# Patient Record
Sex: MALE | Race: WHITE | NOT HISPANIC OR LATINO
[De-identification: names, ages, dates, MRNs, and addresses within clinical notes are randomized per-mention and may not be internally consistent; named-entity substitution may affect disease eponyms.]

---

## 2017-02-05 ENCOUNTER — TRANSCRIPTION ENCOUNTER (OUTPATIENT)
Age: 47
End: 2017-02-05

## 2017-05-16 ENCOUNTER — TRANSCRIPTION ENCOUNTER (OUTPATIENT)
Age: 47
End: 2017-05-16

## 2017-09-11 ENCOUNTER — TRANSCRIPTION ENCOUNTER (OUTPATIENT)
Age: 47
End: 2017-09-11

## 2017-11-25 ENCOUNTER — INPATIENT (INPATIENT)
Facility: HOSPITAL | Age: 47
LOS: 1 days | Discharge: HOME | DRG: 554 | End: 2017-11-27
Attending: INTERNAL MEDICINE | Admitting: INTERNAL MEDICINE
Payer: COMMERCIAL

## 2017-11-25 VITALS
RESPIRATION RATE: 20 BRPM | TEMPERATURE: 99 F | DIASTOLIC BLOOD PRESSURE: 91 MMHG | OXYGEN SATURATION: 96 % | HEART RATE: 76 BPM | SYSTOLIC BLOOD PRESSURE: 187 MMHG

## 2017-11-25 DIAGNOSIS — M25.50 PAIN IN UNSPECIFIED JOINT: ICD-10-CM

## 2017-11-25 LAB
ALBUMIN SERPL ELPH-MCNC: 3.9 G/DL — SIGNIFICANT CHANGE UP (ref 3.3–5)
ALP SERPL-CCNC: 51 U/L — SIGNIFICANT CHANGE UP (ref 40–120)
ALT FLD-CCNC: 40 U/L RC — SIGNIFICANT CHANGE UP (ref 10–45)
ANION GAP SERPL CALC-SCNC: 13 MMOL/L — SIGNIFICANT CHANGE UP (ref 5–17)
APTT BLD: 31.5 SEC — SIGNIFICANT CHANGE UP (ref 27.5–37.4)
AST SERPL-CCNC: 21 U/L — SIGNIFICANT CHANGE UP (ref 10–40)
B PERT IGG+IGM PNL SER: ABNORMAL
BASOPHILS # BLD AUTO: 0 K/UL — SIGNIFICANT CHANGE UP (ref 0–0.2)
BASOPHILS NFR BLD AUTO: 0.3 % — SIGNIFICANT CHANGE UP (ref 0–2)
BILIRUB SERPL-MCNC: 0.5 MG/DL — SIGNIFICANT CHANGE UP (ref 0.2–1.2)
BUN SERPL-MCNC: 10 MG/DL — SIGNIFICANT CHANGE UP (ref 7–23)
CALCIUM SERPL-MCNC: 9 MG/DL — SIGNIFICANT CHANGE UP (ref 8.4–10.5)
CHLORIDE SERPL-SCNC: 102 MMOL/L — SIGNIFICANT CHANGE UP (ref 96–108)
CO2 SERPL-SCNC: 26 MMOL/L — SIGNIFICANT CHANGE UP (ref 22–31)
COLOR FLD: SIGNIFICANT CHANGE UP
CREAT SERPL-MCNC: 0.97 MG/DL — SIGNIFICANT CHANGE UP (ref 0.5–1.3)
CRP SERPL-MCNC: 7 MG/DL — HIGH (ref 0–0.4)
CRYSTALS SNV QL MICRO: SIGNIFICANT CHANGE UP
CRYSTALS SNV QL MICRO: SIGNIFICANT CHANGE UP
EOSINOPHIL # BLD AUTO: 0.1 K/UL — SIGNIFICANT CHANGE UP (ref 0–0.5)
EOSINOPHIL NFR BLD AUTO: 0.7 % — SIGNIFICANT CHANGE UP (ref 0–6)
ERYTHROCYTE [SEDIMENTATION RATE] IN BLOOD: 49 MM/HR — HIGH (ref 0–15)
FLUID INTAKE SUBSTANCE CLASS: SIGNIFICANT CHANGE UP
FLUID SEGMENTED GRANULOCYTES: 85 % — SIGNIFICANT CHANGE UP
GLUCOSE SERPL-MCNC: 79 MG/DL — SIGNIFICANT CHANGE UP (ref 70–99)
HBA1C BLD-MCNC: 5.1 % — SIGNIFICANT CHANGE UP (ref 4–5.6)
HCT VFR BLD CALC: 42.6 % — SIGNIFICANT CHANGE UP (ref 39–50)
HGB BLD-MCNC: 14.7 G/DL — SIGNIFICANT CHANGE UP (ref 13–17)
INR BLD: 1.14 RATIO — SIGNIFICANT CHANGE UP (ref 0.88–1.16)
LYMPHOCYTES # BLD AUTO: 2.2 K/UL — SIGNIFICANT CHANGE UP (ref 1–3.3)
LYMPHOCYTES # BLD AUTO: 22 % — SIGNIFICANT CHANGE UP (ref 13–44)
LYMPHOCYTES # FLD: 10 % — SIGNIFICANT CHANGE UP
MCHC RBC-ENTMCNC: 32.1 PG — SIGNIFICANT CHANGE UP (ref 27–34)
MCHC RBC-ENTMCNC: 34.6 GM/DL — SIGNIFICANT CHANGE UP (ref 32–36)
MCV RBC AUTO: 92.8 FL — SIGNIFICANT CHANGE UP (ref 80–100)
MONOCYTES # BLD AUTO: 1.1 K/UL — HIGH (ref 0–0.9)
MONOCYTES NFR BLD AUTO: 11.4 % — SIGNIFICANT CHANGE UP (ref 2–14)
MONOS+MACROS # FLD: 5 % — SIGNIFICANT CHANGE UP
NEUTROPHILS # BLD AUTO: 6.5 K/UL — SIGNIFICANT CHANGE UP (ref 1.8–7.4)
NEUTROPHILS NFR BLD AUTO: 65.5 % — SIGNIFICANT CHANGE UP (ref 43–77)
PLATELET # BLD AUTO: 217 K/UL — SIGNIFICANT CHANGE UP (ref 150–400)
POTASSIUM SERPL-MCNC: 4 MMOL/L — SIGNIFICANT CHANGE UP (ref 3.5–5.3)
POTASSIUM SERPL-SCNC: 4 MMOL/L — SIGNIFICANT CHANGE UP (ref 3.5–5.3)
PROT SERPL-MCNC: 7.4 G/DL — SIGNIFICANT CHANGE UP (ref 6–8.3)
PROTHROM AB SERPL-ACNC: 12.3 SEC — SIGNIFICANT CHANGE UP (ref 9.8–12.7)
RBC # BLD: 4.59 M/UL — SIGNIFICANT CHANGE UP (ref 4.2–5.8)
RBC # FLD: 10.9 % — SIGNIFICANT CHANGE UP (ref 10.3–14.5)
RCV VOL RI: HIGH /UL (ref 0–5)
RHEUMATOID FACT SERPL-ACNC: 9 IU/ML — SIGNIFICANT CHANGE UP (ref 0–13.9)
SODIUM SERPL-SCNC: 141 MMOL/L — SIGNIFICANT CHANGE UP (ref 135–145)
TOTAL NUCLEATED CELL COUNT, BODY FLUID: HIGH /UL (ref 0–5)
TUBE TYPE: SIGNIFICANT CHANGE UP
URATE SERPL-MCNC: 5.9 MG/DL — SIGNIFICANT CHANGE UP (ref 3.4–8.8)
WBC # BLD: 9.9 K/UL — SIGNIFICANT CHANGE UP (ref 3.8–10.5)
WBC # FLD AUTO: 9.9 K/UL — SIGNIFICANT CHANGE UP (ref 3.8–10.5)

## 2017-11-25 PROCEDURE — 20605 DRAIN/INJ JOINT/BURSA W/O US: CPT | Mod: LT

## 2017-11-25 PROCEDURE — 99285 EMERGENCY DEPT VISIT HI MDM: CPT | Mod: 25

## 2017-11-25 PROCEDURE — 73610 X-RAY EXAM OF ANKLE: CPT | Mod: 26,50

## 2017-11-25 RX ORDER — HEPARIN SODIUM 5000 [USP'U]/ML
5000 INJECTION INTRAVENOUS; SUBCUTANEOUS EVERY 12 HOURS
Qty: 0 | Refills: 0 | Status: DISCONTINUED | OUTPATIENT
Start: 2017-11-25 | End: 2017-11-27

## 2017-11-25 RX ORDER — KETOROLAC TROMETHAMINE 30 MG/ML
15 SYRINGE (ML) INJECTION EVERY 8 HOURS
Qty: 0 | Refills: 0 | Status: DISCONTINUED | OUTPATIENT
Start: 2017-11-25 | End: 2017-11-26

## 2017-11-25 RX ORDER — ACETAMINOPHEN 500 MG
1000 TABLET ORAL ONCE
Qty: 0 | Refills: 0 | Status: COMPLETED | OUTPATIENT
Start: 2017-11-25 | End: 2017-11-25

## 2017-11-25 RX ORDER — SODIUM CHLORIDE 9 MG/ML
1000 INJECTION INTRAMUSCULAR; INTRAVENOUS; SUBCUTANEOUS
Qty: 0 | Refills: 0 | Status: DISCONTINUED | OUTPATIENT
Start: 2017-11-25 | End: 2017-11-27

## 2017-11-25 RX ORDER — PANTOPRAZOLE SODIUM 20 MG/1
40 TABLET, DELAYED RELEASE ORAL
Qty: 0 | Refills: 0 | Status: DISCONTINUED | OUTPATIENT
Start: 2017-11-25 | End: 2017-11-27

## 2017-11-25 RX ORDER — TRIAMCINOLONE 4 MG
30 TABLET ORAL ONCE
Qty: 0 | Refills: 0 | Status: COMPLETED | OUTPATIENT
Start: 2017-11-25 | End: 2017-11-25

## 2017-11-25 RX ORDER — KETOROLAC TROMETHAMINE 30 MG/ML
30 SYRINGE (ML) INJECTION ONCE
Qty: 0 | Refills: 0 | Status: DISCONTINUED | OUTPATIENT
Start: 2017-11-25 | End: 2017-11-25

## 2017-11-25 RX ORDER — MORPHINE SULFATE 50 MG/1
2 CAPSULE, EXTENDED RELEASE ORAL EVERY 4 HOURS
Qty: 0 | Refills: 0 | Status: DISCONTINUED | OUTPATIENT
Start: 2017-11-25 | End: 2017-11-27

## 2017-11-25 RX ORDER — OXYCODONE HYDROCHLORIDE 5 MG/1
5 TABLET ORAL ONCE
Qty: 0 | Refills: 0 | Status: DISCONTINUED | OUTPATIENT
Start: 2017-11-25 | End: 2017-11-25

## 2017-11-25 RX ADMIN — OXYCODONE HYDROCHLORIDE 5 MILLIGRAM(S): 5 TABLET ORAL at 18:24

## 2017-11-25 RX ADMIN — Medication 30 MILLIGRAM(S): at 20:50

## 2017-11-25 RX ADMIN — Medication 30 MILLIGRAM(S): at 13:09

## 2017-11-25 RX ADMIN — Medication 30 MILLIGRAM(S): at 19:07

## 2017-11-25 RX ADMIN — Medication 400 MILLIGRAM(S): at 12:23

## 2017-11-25 RX ADMIN — SODIUM CHLORIDE 75 MILLILITER(S): 9 INJECTION INTRAMUSCULAR; INTRAVENOUS; SUBCUTANEOUS at 21:23

## 2017-11-25 NOTE — ED PROVIDER NOTE - LOWER EXTREMITY EXAM, LEFT
JOINT SWELLING/SWELLING/LIMITED ROM/increased warmth and erythema over medial and lateral malleoli, no specific achillis tenderness

## 2017-11-25 NOTE — ED PROVIDER NOTE - OBJECTIVE STATEMENT
46 yo M w/ PMH gout presenting for b/l LE edema. Right ankle pain 6 days ago. Hx of right ankle synovial cyst. Resolves with rest in time. Now left ankle swollen as well and pt unable to ambulate. Right knee with increased pain and left foot edema > right foot. Took ibuprofen this AM with some relief of symptoms about 3 and a half hours ago. No known trauma or injury. No SOB, recent travel or hospitalization. Uses chewing tobacco and ETOH weekly.

## 2017-11-25 NOTE — ED PROVIDER NOTE - NS_ ATTENDINGSCRIBEDETAILS _ED_A_ED_FT
47 yom no specific pmhx presents with bilat ankle swelling. states in past, has had great toe swelling for which he saw his PMD and had uric acid levels drawn - states they were normal and was told he did not have gout. states right ankle diffuse swelling started 6 days ago, nontraumatic, was having difficulty bearing weight. no f/c. states right ankle has improved somewhat, but now has left ankle swelling/ pain. is unable to walk due to pain. no calf pain, no recent prolonged immob.    ros and exam as above.     ? gout vs septic arthritis vs pseudogout. joint tapped under US guidance but only able to obtain 1-2 cc of fluid. ortho called for consult who tapped in different location - sent for testing. + crystals - will admit for ongoing management of likely gouty flare, and less likely septic arthritis. rheum called. ABHISHEK Canas MD

## 2017-11-25 NOTE — ED PROVIDER NOTE - NS ED ROS FT
ABHISHEK Canas MD   ROS:   constitutional - no fever, no chills  eyes - no visual changes, no redness  eent - no sore throat, no nasal congestion  cvs - no chest pain, no leg swelling  resp - no shortness of breath, no cough  gi - no abdominal pain, no vomiting, no diarrhea  gu - no dysuria, no hematuria  msk - no acute back pain, + joint swelling  skin - no rashes, no jaundice  neuro - no headache, no focal weakness  psych - no acute mental health issue

## 2017-11-25 NOTE — ED PROVIDER NOTE - MEDICAL DECISION MAKING DETAILS
48 yo M for b/l LE edema L>R with increased pain in b/l feet. No hx of trauma. PE: increased warmth and edema in left ankle with tenderness on palpation. No vascular compromise, no calf tenderness. Neurologically intact. Low concern for DVT due to no risk factors. Low concern for septic arthritis due to patient appearance and VS. Likely gout vs pseudogout due to increased warmth and tenderness over affected area, Will obtain labs, uric acid, inflammatory markers, US for fluid collection and labs, pain control, X-rays. Will consider orthopedic consult based on results.

## 2017-11-25 NOTE — ED ADULT NURSE NOTE - OBJECTIVE STATEMENT
47 y.o male arrived to ED c.o bilateral ankle swelling x1 week. Pt states he has an old injury to right ankle ten years ago which causes him to have occasional swelling/ discomfort. Last week he began having pain bilat with swelling which causes him to be unable to ambulate.  Pt was seen in orthopedic clinic last night with xrays performed which did not show anything.  Pt was given a medication from them yesterday which has not helped with pain. Pt arrives a&ox4, neg sob, neg chest pain, abd soft nontender, pulse motor sensoryx4, +swelling to bilateral +1 to ankles and feet , skin warm dry otherwise intact. Family at bedside. will continue to monitor.

## 2017-11-25 NOTE — ED PROCEDURE NOTE - CPROC ED ARTHRO DETAIL1
A needle was inserted into (see location above):/for aspiration and injection of medication (see medication)

## 2017-11-25 NOTE — H&P ADULT - NSHPLABSRESULTS_GEN_ALL_CORE
14.7   9.9   )-----------( 217      ( 25 Nov 2017 12:30 )             42.6       11-25    141  |  102  |  10  ----------------------------<  79  4.0   |  26  |  0.97    Ca    9.0      25 Nov 2017 12:30    TPro  7.4  /  Alb  3.9  /  TBili  0.5  /  DBili  x   /  AST  21  /  ALT  40  /  AlkPhos  51  11-25                  PT/INR - ( 25 Nov 2017 12:30 )   PT: 12.3 sec;   INR: 1.14 ratio         PTT - ( 25 Nov 2017 12:30 )  PTT:31.5 sec    Lactate Trend            CAPILLARY BLOOD GLUCOSE

## 2017-11-25 NOTE — CONSULT NOTE ADULT - SUBJECTIVE AND OBJECTIVE BOX
47y Male with bilateral ankle pain L > R. right ankle pain has resolved, history of gout.  Pt states that they cannot walk because of pain on left side. Pt denies radiation of pain. Pt denies numbness, tingling, or burning in the BL LE. Pt denies hx of trauma. Pt denies fever/chills. Pt is community ambulator with/without an assistive device.                          14.7   9.9   )-----------( 217      ( 25 Nov 2017 12:30 )             42.6   11-25    141  |  102  |  10  ----------------------------<  79  4.0   |  26  |  0.97    Ca    9.0      25 Nov 2017 12:30    TPro  7.4  /  Alb  3.9  /  TBili  0.5  /  DBili  x   /  AST  21  /  ALT  40  /  AlkPhos  51  11-25    ICU Vital Signs Last 24 Hrs  T(C): 37.1 (25 Nov 2017 11:21), Max: 37.1 (25 Nov 2017 11:21)  T(F): 98.7 (25 Nov 2017 11:21), Max: 98.7 (25 Nov 2017 11:21)  HR: 64 (25 Nov 2017 12:05) (64 - 76)  BP: 139/79 (25 Nov 2017 12:05) (139/79 - 187/91)  BP(mean): --  ABP: --  ABP(mean): --  RR: 18 (25 Nov 2017 12:05) (18 - 20)  SpO2: 98% (25 Nov 2017 12:05) (96% - 98%)        LLE:  Skin intact, + soft tissue swelling, effusion, of ankle, + TTP over Ankle w/ decreased ROM 2/2 pain. Good ROM of Hip/Knee/Ankle without pain. Pt able to SLR. +DP/PT Pulse 2+/4.  SILT L2-S1, +EHL/FHL/TA/Gastroc, soft compartments, no calf ttp.    R/L LE / B/L UE:   No bony TTP, Good ROM w/o pain. Able to SLR on R/L. Exam Unremarkable      Imaging:  XR of L Ankle:   demonstrate no acute fracture or dislocation. + Effusion.     Procedure Note:  The emergency department performed a L ankle arthrocentesis ultrasound guided    A/P:     47yMale w/ rule out L septic ankle  - Pain control  - DVT ppx per primary team  - WBAT LLE  - FU Lab Work, cell count, crystals, gram stain, culture  - PT   - Med mgmt per primary team  - IV abx per primary team/ID  - pt candidate for possible arthroscopic vs. open I&D of affected joint if labs show signs of infection, pt aware, will FU  - all pt's/family members questions answered completely, pt/family understand plan  - NPO until gram stain has resulted 47y Male with bilateral ankle pain L > R. right ankle pain has resolved, history of gout.  Pt states that they cannot walk because of pain on left side. Pt denies radiation of pain. Pt denies numbness, tingling, or burning in the BL LE. Pt denies hx of trauma. Pt denies fever/chills. Pt is community ambulator with/without an assistive device.                          14.7   9.9   )-----------( 217      ( 25 Nov 2017 12:30 )             42.6   11-25    141  |  102  |  10  ----------------------------<  79  4.0   |  26  |  0.97    Ca    9.0      25 Nov 2017 12:30    TPro  7.4  /  Alb  3.9  /  TBili  0.5  /  DBili  x   /  AST  21  /  ALT  40  /  AlkPhos  51  11-25    ICU Vital Signs Last 24 Hrs  T(C): 37.1 (25 Nov 2017 11:21), Max: 37.1 (25 Nov 2017 11:21)  T(F): 98.7 (25 Nov 2017 11:21), Max: 98.7 (25 Nov 2017 11:21)  HR: 64 (25 Nov 2017 12:05) (64 - 76)  BP: 139/79 (25 Nov 2017 12:05) (139/79 - 187/91)  BP(mean): --  ABP: --  ABP(mean): --  RR: 18 (25 Nov 2017 12:05) (18 - 20)  SpO2: 98% (25 Nov 2017 12:05) (96% - 98%)        LLE:  Skin intact, + soft tissue swelling, effusion, of ankle, + TTP over Ankle w/ decreased ROM 2/2 pain. Good ROM of Hip/Knee/Ankle without pain. Pt able to SLR. +DP/PT Pulse 2+/4.  SILT L2-S1, +EHL/FHL/TA/Gastroc, soft compartments, no calf ttp.    R/L LE / B/L UE:   No bony TTP, Good ROM w/o pain. Able to SLR on R/L. Exam Unremarkable      Imaging:  XR of L Ankle:   demonstrate no acute fracture or dislocation. + Effusion.     Procedure Note:  The emergency department performed a L ankle arthrocentesis ultrasound guided    A/P:     47yMale w/ rule out L septic ankle, gram stain negative, most likely not septic  - Pain control  - DVT ppx per primary team  - WBAT LLE  - FU culture  - PT   - Med mgmt per primary team  - IV abx as needed per primary team  - all pt's/family members questions answered completely, pt/family understand plan  - no acute ortho intervention needed at this time, will follow culture to confirm ankle is not septic  - FU with Dr. Dodson as outpatient  - please call back with questions 47y Male with bilateral ankle pain L > R. right ankle pain has resolved over the last couple days, history of gout.  Pt went to an urgent care and was told just needs physical therapy. states that he cannot walk because of pain on left side, can put weight on right but with pain. Pt denies radiation of pain. Pt denies numbness, tingling, or burning in the BL LE. Pt denies hx of trauma. Pt denies fever/chills. Pt is community ambulator with/without an assistive device.                          14.7   9.9   )-----------( 217      ( 25 Nov 2017 12:30 )             42.6   11-25    141  |  102  |  10  ----------------------------<  79  4.0   |  26  |  0.97    Ca    9.0      25 Nov 2017 12:30    TPro  7.4  /  Alb  3.9  /  TBili  0.5  /  DBili  x   /  AST  21  /  ALT  40  /  AlkPhos  51  11-25    ICU Vital Signs Last 24 Hrs  T(C): 37.1 (25 Nov 2017 11:21), Max: 37.1 (25 Nov 2017 11:21)  T(F): 98.7 (25 Nov 2017 11:21), Max: 98.7 (25 Nov 2017 11:21)  HR: 64 (25 Nov 2017 12:05) (64 - 76)  BP: 139/79 (25 Nov 2017 12:05) (139/79 - 187/91)  BP(mean): --  ABP: --  ABP(mean): --  RR: 18 (25 Nov 2017 12:05) (18 - 20)  SpO2: 98% (25 Nov 2017 12:05) (96% - 98%)        LLE:  Skin intact, + soft tissue swelling, effusion, erythema over medial mal of ankle, + TTP over Ankle w/ decreased ROM 2/2 pain unable to DR or PF. Good ROM of Hip/Knee/Ankle without pain. Pt able to SLR. +DP/PT Pulse 2+/4.  SILT L2-S1, +EHL/FHL/TA/Gastroc, soft compartments, no calf ttp.    RLE  Skin intact, mild soft tissue swelling, no gross effusion of ankle, + TTP over Ankle w/ good ROM full DF and PF. Good ROM of Hip/Knee/Ankle without pain. Pt able to SLR. +DP/PT Pulse 2+/4.  SILT L2-S1, +EHL/FHL/TA/Gastroc, soft compartments, no calf ttp.    B/L UE:   No bony TTP, Good ROM w/o pain. Able to SLR on R/L. Exam Unremarkable      Imaging:  XR of L Ankle:   demonstrate no acute fracture or dislocation. + Effusion.     Procedure Note:  The emergency department performed a L ankle arthrocentesis ultrasound guided    I personally re-aspirated the ankle joint under sterile technique. aspirated 7cc of sero sanguis fluid    A/P:     47yMale w/ rule out L septic ankle  - Pain control  - DVT ppx per primary team  - WBAT LLE  - FU cc. nancy. gram stain and culture  - PT   - Med mgmt per primary team  - IV abx as per primary team  - all pt's/family members questions answered completely, pt/family understand plan  - keep NPO until gram stain and cell count has been received

## 2017-11-25 NOTE — CHART NOTE - NSCHARTNOTEFT_GEN_A_CORE
L ankle aspiration results received. CC 14K, Crystals positive for monosodium urate, GS neg    L ankle gout, highly unlikely septic joint, will follow cultures    No further ortho intervention needed at this time  Please call back with questions  May follow up with Dr. Dodson as outpatient as needed    Rich Wilder  PGY [phone]  [phone]

## 2017-11-25 NOTE — H&P ADULT - HISTORY OF PRESENT ILLNESS
46 yo Male  w/ ? of gout presenting for b/l LE edema. Right ankle painabout 1 week ago . Hx of right ankle synovial cyst. Resolves with rest   . Now left ankle swollen as well and pt unable to ambulate.  left foot edema > right foot. Took ibuprofen this AM with some relief of symptoms   No known trauma or injury. No SOB, recent travel or hospitalization.

## 2017-11-25 NOTE — H&P ADULT - ASSESSMENT
pt w/ b/l ankle pain / swelling / inabiltity to walk  ?gout / acute arthritic flare  septic joint is low possibility  f/u aspiration  id eval  rheum eval  analgesics  dvt proph  f/u rf/ shelia/ tsh

## 2017-11-25 NOTE — ED ADULT NURSE NOTE - CHIEF COMPLAINT QUOTE
bilat lower extrem pain x 1week ago, difficulty ambulating, bilateral lower extrem edema pos erythema

## 2017-11-25 NOTE — ED PROVIDER NOTE - PHYSICAL EXAMINATION
ABHISHEK Canas MD   Physical Exam:   constitutional - well appearing, awake and alert, oriented x3  head - no external evidence of trauma  cvs - rrr, no murmurs, no peripheral edema  resp - breath sounds clear and equal bilat  gi - abdomen soft and nontender, no rigidity, guarding or rebound, bowel sounds present  msk - moving all extremities spontaneously. right ankle w mild diffuse swelling and decr rom secondary to pain. left ankle significantly swollen, erythematous, warm to touch, stiff upon rom. distal dp pulses intact bilat.   neuro - alert and oriented x3, no focal deficits, CNs 2-12 grossly intact  skin- no jaundice, warm and dry  psych - mood and affect wnl, no apparent risk to self or others

## 2017-11-26 LAB
GRAM STN FLD: SIGNIFICANT CHANGE UP
SPECIMEN SOURCE: SIGNIFICANT CHANGE UP
TSH SERPL-MCNC: 0.68 UIU/ML — SIGNIFICANT CHANGE UP (ref 0.27–4.2)

## 2017-11-26 PROCEDURE — 99254 IP/OBS CNSLTJ NEW/EST MOD 60: CPT

## 2017-11-26 PROCEDURE — 93970 EXTREMITY STUDY: CPT | Mod: 26

## 2017-11-26 RX ORDER — KETOROLAC TROMETHAMINE 30 MG/ML
30 SYRINGE (ML) INJECTION EVERY 8 HOURS
Qty: 0 | Refills: 0 | Status: DISCONTINUED | OUTPATIENT
Start: 2017-11-26 | End: 2017-11-27

## 2017-11-26 RX ADMIN — PANTOPRAZOLE SODIUM 40 MILLIGRAM(S): 20 TABLET, DELAYED RELEASE ORAL at 04:57

## 2017-11-26 RX ADMIN — Medication 15 MILLIGRAM(S): at 11:00

## 2017-11-26 RX ADMIN — Medication 30 MILLIGRAM(S): at 17:51

## 2017-11-26 RX ADMIN — Medication 15 MILLIGRAM(S): at 02:05

## 2017-11-26 RX ADMIN — HEPARIN SODIUM 5000 UNIT(S): 5000 INJECTION INTRAVENOUS; SUBCUTANEOUS at 17:53

## 2017-11-26 RX ADMIN — Medication 30 MILLIGRAM(S): at 18:06

## 2017-11-26 RX ADMIN — HEPARIN SODIUM 5000 UNIT(S): 5000 INJECTION INTRAVENOUS; SUBCUTANEOUS at 04:57

## 2017-11-26 RX ADMIN — Medication 15 MILLIGRAM(S): at 01:35

## 2017-11-26 RX ADMIN — Medication 15 MILLIGRAM(S): at 10:40

## 2017-11-26 NOTE — CONSULT NOTE ADULT - ASSESSMENT
Imp:  46 yo male with acute arthritis of the ankle; no trauma.  Started after drinking EtoH and eating red meat and joint fluid aspirate shows monosodium urate crystals which confirms gouty arthritis.  Infection is unlikely frank with negative culture of joint fluid.    Rec:  Increase Toradol to 30mg tid  Discussed diet in detail  May need uric acid lowering treatment (despite current acceptable uric level) due to number and severity of attacks, but this should be addressed as outpatient after the attack has resolved.

## 2017-11-26 NOTE — CONSULT NOTE ADULT - ASSESSMENT
47M with acute gouty arthropathy of the left ankle.  No evidence of septic joint.  ESR and temperature can be elevated on the basis of gout.  Agree with no antibiotics    - continue to observe off antibiotics  - f/u rheumatology  - please call ID if change in status

## 2017-11-26 NOTE — PROGRESS NOTE ADULT - ASSESSMENT
pt w/ b/l ankle pain / swelling / inabiltity to walk  likely severe gouty attack / acute arthritic flare  septic joint is low possibility  f/u aspiration   id eval  rheum eval  analgesics  dvt proph  f/u rf/ shelia/ tsh

## 2017-11-26 NOTE — CONSULT NOTE ADULT - SUBJECTIVE AND OBJECTIVE BOX
HISTORY OF PRESENT ILLNESS:  46 yo male with h/o presumed gout (no h/o joint fluid aspiration in the past); he has had arthritic attacks in feet and wrist ~3x/year over the last 8 years or so,.  He comes in after one week of acute onset L ankle pain, which has also affected the L knee and R ankle, though not as severely.  He was unable to bear weight.  He drinks Rum weekly and eats red meat most days.  No trauma or injury    PAST MEDICAL & SURGICAL HISTORY:  No pertinent past medical history  No significant past surgical history        MEDICATIONS  (STANDING):  heparin  Injectable 5000 Unit(s) SubCutaneous every 12 hours  ketorolac   Injectable 15 milliGRAM(s) IV Push every 8 hours  pantoprazole    Tablet 40 milliGRAM(s) Oral before breakfast  sodium chloride 0.9%. 1000 milliLiter(s) (75 mL/Hr) IV Continuous <Continuous>    MEDICATIONS  (PRN):  morphine  - Injectable 2 milliGRAM(s) IV Push every 4 hours PRN Severe Pain (7 - 10)      Allergies    No Known Allergies    Intolerances        PERTINENT MEDICATION HISTORY:    SOCIAL HISTORY:    FAMILY HISTORY:  No pertinent family history in first degree relatives (pt states he was adopted)      Vital Signs Last 24 Hrs  T(C): 37.1 (26 Nov 2017 09:23), Max: 38.6 (25 Nov 2017 18:25)  T(F): 98.8 (26 Nov 2017 09:23), Max: 101.5 (25 Nov 2017 18:25)  HR: 77 (26 Nov 2017 09:23) (64 - 81)  BP: 162/87 (26 Nov 2017 09:23) (139/79 - 187/91)  BP(mean): --  RR: 18 (26 Nov 2017 09:23) (18 - 20)  SpO2: 97% (26 Nov 2017 09:23) (96% - 100%)    Daily     Daily     PHYSICAL EXAM:      Constitutional:  well appearing    Eyes:  EOMi    ENMT:    Neck:  supple    Breasts:    Back:    Respiratory:    Cardiovascular:    Gastrointestinal:  soft nt, nd    Genitourinary:    Rectal:    Extremities:  No sign edema    Vascular:  good distal perfusion    Neurological:    Skin:  No rashes seen    Lymph Nodes:    Musculoskeletal:  L knee; pain with ROM, no effusion  L ankle; very painful ROM-both subtalar and ankle mortise, tender to palpation, +warmth, mild rednes  R ankle; good ROM, no sign pain  Feet; non tender-1st MTPs with good ROM    Psychiatric:  appropriate, alert, oriented, has full conversation    LABS:                        14.7   9.9   )-----------( 217      ( 25 Nov 2017 12:30 )             42.6     11-25    141  |  102  |  10  ----------------------------<  79  4.0   |  26  |  0.97    Ca    9.0      25 Nov 2017 12:30    TPro  7.4  /  Alb  3.9  /  TBili  0.5  /  DBili  x   /  AST  21  /  ALT  40  /  AlkPhos  51  11-25    PT/INR - ( 25 Nov 2017 12:30 )   PT: 12.3 sec;   INR: 1.14 ratio         PTT - ( 25 Nov 2017 12:30 )  PTT:31.5 sec  Uric acid -5.9  Joint fluid aspirate:   +MSU crystals  14K WBCs  Culture negative        RADIOLOGY & ADDITIONAL STUDIES:

## 2017-11-26 NOTE — CONSULT NOTE ADULT - SUBJECTIVE AND OBJECTIVE BOX
HPI:  47M presumptive hx R ankle gout.  Now admitted yesterday with left ankle pain and swelling.  Low grade fever.  Aspirated and found to have crystals.  ID is asked to help management.  Still wtih pain and unable to ambulate.  Seen by rheumatology who has confirmed diagnosis.  Gram stain negative for bacteria.  No cell count sent.  (+) crystals.  No antibiotics given.    PAST MEDICAL & SURGICAL HISTORY:  gout    Allergies  No Known Allergies    ANTIMICROBIALS:    none    OTHER MEDS: MEDICATIONS  (STANDING):  heparin  Injectable 5000 every 12 hours  ketorolac   Injectable 30 every 8 hours  morphine  - Injectable 2 every 4 hours PRN  pantoprazole    Tablet 40 before breakfast    SOCIAL HISTORY:  chewing tobacco; ; works for health system in IT    FAMILY HISTORY:  No pertinent family history in first degree relatives    REVIEW OF SYSTEMS  [  ] ROS unobtainable because:    [x  ] All other systems negative except as noted below:	    Constitutional:  [x ] fever [ ] weight loss  Skin:  [ ] rash [ ] phlebitis	  Eyes: [ ] icterus [ ] inflammation	  ENMT: [ ] discharge [ ] thrush [ ] ulcers [ ] exudates  Respiratory: [ ] dyspnea [ ] hemoptysis [ ] cough [ ] sputum	  Cardiovascular:  [ ] chest pain [ ] palpitations [ ] edema	  Gastrointestinal:  [ ] nausea [ ] vomiting [ ] diarrhea [ ] constipation [ ] pain	  Genitourinary:  [ ] dysuria [ ] frequency [ ] hematuria [ ] discharge [ ] flank pain  Musculoskeletal:  [ ] myalgias [ ] arthralgias [x ] arthritis	  Neurological:  [ ] headache [ ] seizures	  Psychiatric:  [ ] anxiety [ ] depression	  Hematology/Lymphatics:  [ ] lymphadenopathy  Endocrine:  [ ] adrenal [ ] thyroid  Allergic/Immunologic:	 [ ] transplant [ ] seasonal    Vital Signs Last 24 Hrs  T(F): 98.8 (11-26-17 @ 09:23), Max: 101.5 (11-25-17 @ 18:25)  HR: 77 (11-26-17 @ 09:23) (64 - 81)  BP: 162/87 (11-26-17 @ 09:23) (139/79 - 187/91)  RR: 18 (11-26-17 @ 09:23)  SpO2: 97% (11-26-17 @ 09:23) (96% - 100%)  Wt(kg): --    PHYSICAL EXAM:  General: non-toxic  HEAD/EYES: anicteric  ENT:  supple  Cardiovascular:   S1, S2  Respiratory:  clear bilaterally  GI:  soft, non-tender, normal bowel sounds  :  no olson  Musculoskeletal:  decreased ROM L ankle with associated swelling; no cellulitis  Neurologic:  grossly non-focal  Skin:  no rash  Lymph: no lymphadenopathy  Psychiatric:  appropriate affect  Vascular:  no phlebitis                        14.7   9.9   )-----------( 217      ( 25 Nov 2017 12:30 )             42.6     141  |  102  |  10  ----------------------------<  79  4.0   |  26  |  0.97    Ca    9.0      25 Nov 2017 12:30    TPro  7.4  /  Alb  3.9  /  TBili  0.5  /  DBili  x   /  AST  21  /  ALT  40  /  AlkPhos  51  11-25    Sedimentation Rate, Erythrocyte: 49 mm/hr (11.25.17 @ 12:30)    Crystals, Synovial Fluid:   CONTAINER: _STERILE  CUP  COLOR: RED   Ref Range: Colorless  CLARITY: BLOODY   Ref Range: Clear  RESULT: FEW  MONOSODIUM  URATE CRYSTALS.  Ref Range: None Seen (11.25.17 @ 19:22)    MICROBIOLOGY:  .Body Fluid Synovial Fluid  11-26-17 --  --    Numerous polymorphonuclear leukocytes per low power field  No organisms seen  by cytocentrifuge    .Body Fluid synovial fluid  11-25-17 --  --    No polymorphonuclear cells seen per low power field  No organisms seen per oil power field    RADIOLOGY:  Xray Ankle Complete 3 Views, Bilateral (11.25.17 @ 13:13) >  IMPRESSION:  No acute bone or joint disease.                v            RADIOLOGY:

## 2017-11-26 NOTE — PROGRESS NOTE ADULT - SUBJECTIVE AND OBJECTIVE BOX
CHIEF COMPLAINT:Patient is sightly better  	        PAST MEDICAL & SURGICAL HISTORY:  No pertinent past medical history  No significant past surgical history          REVIEW OF SYSTEMS:  CONSTITUTIONAL: No fever, weight loss, or fatigue  EYES: No eye pain, visual disturbances, or discharge  NECK: No pain or stiffness  RESPIRATORY: No cough, wheezing, chills or hemoptysis; No Shortness of Breath  CARDIOVASCULAR: No chest pain, palpitations, passing out, dizziness, or leg swelling  GASTROINTESTINAL: No abdominal or epigastric pain. No nausea, vomiting, or hematemesis; No diarrhea or constipation. No melena or hematochezia.  GENITOURINARY: No dysuria, frequency, hematuria, or incontinence  NEUROLOGICAL: No headaches, memory loss, loss of strength, numbness, or tremors  SKIN: No itching, burning, rashes, or lesion    Medications:  MEDICATIONS  (STANDING):  heparin  Injectable 5000 Unit(s) SubCutaneous every 12 hours  ketorolac   Injectable 15 milliGRAM(s) IV Push every 8 hours  pantoprazole    Tablet 40 milliGRAM(s) Oral before breakfast  sodium chloride 0.9%. 1000 milliLiter(s) (75 mL/Hr) IV Continuous <Continuous>    MEDICATIONS  (PRN):  morphine  - Injectable 2 milliGRAM(s) IV Push every 4 hours PRN Severe Pain (7 - 10)    	    PHYSICAL EXAM:  T(C): 37.1 (11-26-17 @ 09:23), Max: 38.6 (11-25-17 @ 18:25)  HR: 77 (11-26-17 @ 09:23) (64 - 81)  BP: 162/87 (11-26-17 @ 09:23) (139/79 - 187/91)  RR: 18 (11-26-17 @ 09:23) (18 - 20)  SpO2: 97% (11-26-17 @ 09:23) (96% - 100%)  Wt(kg): --  I&O's Summary    25 Nov 2017 07:01  -  26 Nov 2017 07:00  --------------------------------------------------------  IN: 0 mL / OUT: 700 mL / NET: -700 mL    26 Nov 2017 07:01 - 26 Nov 2017 09:53  --------------------------------------------------------  IN: 300 mL / OUT: 500 mL / NET: -200 mL        Appearance: Normal	  HEENT:   Normal oral mucosa, PERRL, EOMI	  Lymphatic: No lymphadenopathy  Cardiovascular: Normal S1 S2, No JVD, No murmurs, No edema  Respiratory: Lungs clear to auscultation	  Psychiatry: A & O x 3, Mood & affect appropriate  Gastrointestinal:  Soft, Non-tender, + BS	  Skin: No rashes, No ecchymoses, No cyanosis	  Neurologic: Non-focal  Extremities:b/l ankle pain / tenderness / swelling   Vascular: Peripheral pulses palpable 2+ bilaterally    TELEMETRY: 	    ECG:  	  RADIOLOGY:  OTHER: 	  	  LABS:	 	    CARDIAC MARKERS:                                14.7   9.9   )-----------( 217      ( 25 Nov 2017 12:30 )             42.6     11-25    141  |  102  |  10  ----------------------------<  79  4.0   |  26  |  0.97    Ca    9.0      25 Nov 2017 12:30    TPro  7.4  /  Alb  3.9  /  TBili  0.5  /  DBili  x   /  AST  21  /  ALT  40  /  AlkPhos  51  11-25    proBNP:   Lipid Profile:   HgA1c: Hemoglobin A1C, Whole Blood: 5.1 % (11-25 @ 23:16)    TSH: Thyroid Stimulating Hormone, Serum: 0.68 uIU/mL (11-25 @ 23:16)

## 2017-11-27 ENCOUNTER — TRANSCRIPTION ENCOUNTER (OUTPATIENT)
Age: 47
End: 2017-11-27

## 2017-11-27 VITALS
HEART RATE: 73 BPM | OXYGEN SATURATION: 97 % | SYSTOLIC BLOOD PRESSURE: 158 MMHG | RESPIRATION RATE: 18 BRPM | TEMPERATURE: 99 F | DIASTOLIC BLOOD PRESSURE: 94 MMHG

## 2017-11-27 LAB
ANA PAT FLD IF-IMP: ABNORMAL
ANA TITR SER: ABNORMAL

## 2017-11-27 PROCEDURE — G0378: CPT

## 2017-11-27 PROCEDURE — 86431 RHEUMATOID FACTOR QUANT: CPT

## 2017-11-27 PROCEDURE — 87070 CULTURE OTHR SPECIMN AEROBIC: CPT

## 2017-11-27 PROCEDURE — 84550 ASSAY OF BLOOD/URIC ACID: CPT

## 2017-11-27 PROCEDURE — 96374 THER/PROPH/DIAG INJ IV PUSH: CPT | Mod: XU

## 2017-11-27 PROCEDURE — 85652 RBC SED RATE AUTOMATED: CPT

## 2017-11-27 PROCEDURE — 85730 THROMBOPLASTIN TIME PARTIAL: CPT

## 2017-11-27 PROCEDURE — 20605 DRAIN/INJ JOINT/BURSA W/O US: CPT | Mod: LT

## 2017-11-27 PROCEDURE — 87205 SMEAR GRAM STAIN: CPT

## 2017-11-27 PROCEDURE — 97161 PT EVAL LOW COMPLEX 20 MIN: CPT

## 2017-11-27 PROCEDURE — 80053 COMPREHEN METABOLIC PANEL: CPT

## 2017-11-27 PROCEDURE — 99285 EMERGENCY DEPT VISIT HI MDM: CPT | Mod: 25

## 2017-11-27 PROCEDURE — 86140 C-REACTIVE PROTEIN: CPT

## 2017-11-27 PROCEDURE — 85610 PROTHROMBIN TIME: CPT

## 2017-11-27 PROCEDURE — 93970 EXTREMITY STUDY: CPT

## 2017-11-27 PROCEDURE — 86038 ANTINUCLEAR ANTIBODIES: CPT

## 2017-11-27 PROCEDURE — 85027 COMPLETE CBC AUTOMATED: CPT

## 2017-11-27 PROCEDURE — 84443 ASSAY THYROID STIM HORMONE: CPT

## 2017-11-27 PROCEDURE — 96375 TX/PRO/DX INJ NEW DRUG ADDON: CPT | Mod: XU

## 2017-11-27 PROCEDURE — 87075 CULTR BACTERIA EXCEPT BLOOD: CPT

## 2017-11-27 PROCEDURE — 89060 EXAM SYNOVIAL FLUID CRYSTALS: CPT

## 2017-11-27 PROCEDURE — 73610 X-RAY EXAM OF ANKLE: CPT

## 2017-11-27 PROCEDURE — 83036 HEMOGLOBIN GLYCOSYLATED A1C: CPT

## 2017-11-27 PROCEDURE — 89051 BODY FLUID CELL COUNT: CPT

## 2017-11-27 RX ORDER — KETOROLAC TROMETHAMINE 30 MG/ML
3 SYRINGE (ML) INJECTION
Qty: 27 | Refills: 0 | OUTPATIENT
Start: 2017-11-27 | End: 2017-11-30

## 2017-11-27 RX ORDER — KETOROLAC TROMETHAMINE 30 MG/ML
1 SYRINGE (ML) INJECTION
Qty: 12 | Refills: 0 | OUTPATIENT
Start: 2017-11-27 | End: 2017-11-30

## 2017-11-27 RX ORDER — KETOROLAC TROMETHAMINE 30 MG/ML
1 SYRINGE (ML) INJECTION
Qty: 12 | Refills: 0
Start: 2017-11-27 | End: 2017-11-30

## 2017-11-27 RX ORDER — KETOROLAC TROMETHAMINE 30 MG/ML
1 SYRINGE (ML) INJECTION
Qty: 0 | Refills: 0 | COMMUNITY

## 2017-11-27 RX ORDER — PANTOPRAZOLE SODIUM 20 MG/1
1 TABLET, DELAYED RELEASE ORAL
Qty: 7 | Refills: 0 | OUTPATIENT
Start: 2017-11-27 | End: 2017-12-04

## 2017-11-27 RX ORDER — IBUPROFEN 200 MG
4 TABLET ORAL
Qty: 0 | Refills: 0 | COMMUNITY

## 2017-11-27 RX ORDER — PANTOPRAZOLE SODIUM 20 MG/1
1 TABLET, DELAYED RELEASE ORAL
Qty: 7 | Refills: 0
Start: 2017-11-27 | End: 2017-12-04

## 2017-11-27 RX ADMIN — Medication 30 MILLIGRAM(S): at 02:00

## 2017-11-27 RX ADMIN — Medication 20 MILLIGRAM(S): at 10:09

## 2017-11-27 RX ADMIN — PANTOPRAZOLE SODIUM 40 MILLIGRAM(S): 20 TABLET, DELAYED RELEASE ORAL at 05:20

## 2017-11-27 RX ADMIN — Medication 30 MILLIGRAM(S): at 10:09

## 2017-11-27 RX ADMIN — Medication 30 MILLIGRAM(S): at 01:21

## 2017-11-27 RX ADMIN — HEPARIN SODIUM 5000 UNIT(S): 5000 INJECTION INTRAVENOUS; SUBCUTANEOUS at 05:20

## 2017-11-27 NOTE — PHYSICAL THERAPY INITIAL EVALUATION ADULT - PERTINENT HX OF CURRENT PROBLEM, REHAB EVAL
46 yo male admit with c/o difficulty ambulating, bilat LE swelling. Pt found to have gout bilat ankle

## 2017-11-27 NOTE — CHART NOTE - NSCHARTNOTEFT_GEN_A_CORE
Request from Dr. Humphries to facilitate patient discharge. Medication reconciliation reviewed, revised, and resolved with Dr. humphries who had medically cleared patient for discharge with follow-up as advised. Please refer to discharge note for detailed hospital course. Patient is currently stable for discharge to home at this time.    Contact # [phone]

## 2017-11-27 NOTE — PROGRESS NOTE ADULT - SUBJECTIVE AND OBJECTIVE BOX
CHIEF COMPLAINT:Patient improved  	        PAST MEDICAL & SURGICAL HISTORY:  No pertinent past medical history  No significant past surgical history          REVIEW OF SYSTEMS:  CONSTITUTIONAL: No fever, weight loss, or fatigue  EYES: No eye pain, visual disturbances, or discharge  NECK: No pain or stiffness  RESPIRATORY: No cough, wheezing, chills or hemoptysis; No Shortness of Breath  CARDIOVASCULAR: No chest pain, palpitations, passing out, dizziness, or leg swelling  GASTROINTESTINAL: No abdominal or epigastric pain. No nausea, vomiting, or hematemesis; No diarrhea or constipation. No melena or hematochezia.  GENITOURINARY: No dysuria, frequency, hematuria, or incontinence  NEUROLOGICAL: No headaches, memory loss, loss of strength, numbness, or tremors  less ankle foot and knee pain     Medications:  MEDICATIONS  (STANDING):  heparin  Injectable 5000 Unit(s) SubCutaneous every 12 hours  ketorolac   Injectable 30 milliGRAM(s) IV Push every 8 hours  methylPREDNISolone sodium succinate Injectable 20 milliGRAM(s) IV Push once  pantoprazole    Tablet 40 milliGRAM(s) Oral before breakfast  sodium chloride 0.9%. 1000 milliLiter(s) (75 mL/Hr) IV Continuous <Continuous>    MEDICATIONS  (PRN):  morphine  - Injectable 2 milliGRAM(s) IV Push every 4 hours PRN Severe Pain (7 - 10)    	    PHYSICAL EXAM:  T(C): 36.6 (11-27-17 @ 06:28), Max: 37.1 (11-26-17 @ 09:23)  HR: 60 (11-27-17 @ 06:28) (60 - 77)  BP: 132/78 (11-27-17 @ 06:28) (132/78 - 169/93)  RR: 18 (11-27-17 @ 06:28) (18 - 18)  SpO2: 97% (11-27-17 @ 06:28) (96% - 97%)  Wt(kg): --  I&O's Summary    26 Nov 2017 07:01  -  27 Nov 2017 07:00  --------------------------------------------------------  IN: 2770 mL / OUT: 1675 mL / NET: 1095 mL    27 Nov 2017 07:01  -  27 Nov 2017 09:22  --------------------------------------------------------  IN: 240 mL / OUT: 725 mL / NET: -485 mL        Appearance: Normal	  HEENT:   Normal oral mucosa, PERRL, EOMI	  Lymphatic: No lymphadenopathy  Cardiovascular: Normal S1 S2, No JVD, No murmurs, No edema  Respiratory: Lungs clear to auscultation	  Psychiatry: A & O x 3, Mood & affect appropriate  Gastrointestinal:  Soft, Non-tender, + BS	  Skin: No rashes, No ecchymoses, No cyanosis	  Neurologic: Non-focal  Extremities:rom better less tender and swollen  Vascular: Peripheral pulses palpable 2+ bilaterally    TELEMETRY: 	    ECG:  	  RADIOLOGY:  OTHER: 	  	  LABS:	 	    CARDIAC MARKERS:                                14.7   9.9   )-----------( 217      ( 25 Nov 2017 12:30 )             42.6     11-25    141  |  102  |  10  ----------------------------<  79  4.0   |  26  |  0.97    Ca    9.0      25 Nov 2017 12:30    TPro  7.4  /  Alb  3.9  /  TBili  0.5  /  DBili  x   /  AST  21  /  ALT  40  /  AlkPhos  51  11-25    proBNP:   Lipid Profile:   HgA1c:   TSH:

## 2017-11-27 NOTE — DISCHARGE NOTE ADULT - CARE PLAN
Principal Discharge DX:	Gouty arthritis of ankle or foot  Goal:	Complete resolution  Instructions for follow-up, activity and diet:	NO ALCOHOL, RED MEAT - diet as discussed  Follow up with Rheumatologist - Dr. Goldberg in 2 days - call for appointment  Seek medical help for increase in pain, fever, chills, worsening swelling and redness

## 2017-11-27 NOTE — DISCHARGE NOTE ADULT - MEDICATION SUMMARY - MEDICATIONS TO STOP TAKING
I will STOP taking the medications listed below when I get home from the hospital:    Advil 200 mg oral tablet  -- 4 tab(s) by mouth 3 times a day, As Needed    black cherry extract  -- 1 tab(s) by mouth once a day

## 2017-11-27 NOTE — PHYSICAL THERAPY INITIAL EVALUATION ADULT - ADDITIONAL COMMENTS
Pt lives in private home with spouse, 3 steps to enter, 10 steps to second floor. Pt with railing for steps. Pt not using AD prior to admission.

## 2017-11-27 NOTE — DISCHARGE NOTE ADULT - MEDICATION SUMMARY - MEDICATIONS TO TAKE
I will START or STAY ON the medications listed below when I get home from the hospital:    ketorolac 10 mg oral tablet  -- 1 tab(s) by mouth 4 times a day   -- It is very important that you take or use this exactly as directed.  Do not skip doses or discontinue unless directed by your doctor.  May cause drowsiness or dizziness.  Obtain medical advice before taking any non-prescription drugs as some may affect the action of this medication.  Take with food or milk.    -- Indication: For Arthritic pain    Protonix 40 mg oral delayed release tablet  -- 1 tab(s) by mouth once a day   -- It is very important that you take or use this exactly as directed.  Do not skip doses or discontinue unless directed by your doctor.  Obtain medical advice before taking any non-prescription drugs as some may affect the action of this medication.  Swallow whole.  Do not crush.    -- Indication: For stomach protection    Multiple Vitamins oral tablet  -- 1 tab(s) by mouth once a day  -- Indication: For supplement I will START or STAY ON the medications listed below when I get home from the hospital:    ketorolac 10 mg oral tablet  -- 1 tab(s) by mouth 4 times a day   -- It is very important that you take or use this exactly as directed.  Do not skip doses or discontinue unless directed by your doctor.  May cause drowsiness or dizziness.  Obtain medical advice before taking any non-prescription drugs as some may affect the action of this medication.  Take with food or milk.    -- Indication: For pain    Protonix 40 mg oral delayed release tablet  -- 1 tab(s) by mouth once a day   -- It is very important that you take or use this exactly as directed.  Do not skip doses or discontinue unless directed by your doctor.  Obtain medical advice before taking any non-prescription drugs as some may affect the action of this medication.  Swallow whole.  Do not crush.    -- Indication: For prophylaxis    Multiple Vitamins oral tablet  -- 1 tab(s) by mouth once a day  -- Indication: For supplement

## 2017-11-27 NOTE — DISCHARGE NOTE ADULT - HOSPITAL COURSE
48 yo male with h/o presumed gout (no h/o joint fluid aspiration in the past); he has had arthritic attacks in feet and wrist ~3x/year over the last 8 years  presents to ER with one week of acute onset L ankle pain, which has also affected the L knee and R ankle, though not as severely and inability to bear weight.  Started after drinking EtoH and eating red meat and joint fluid aspirate shows monosodium urate crystals which confirms gouty arthritis.  Infection is unlikely frank with negative culture of joint fluid.   Left ankle joint fluid  aspirate on 11/25 - +MSU crystals, Culture negative 14K WBCs  Per Rheumatology - continue toradol for pain, may need uric acid lowering treatment (despite current acceptable uric level) due to number and severity of attacks, but this should be addressed as outpatient after the attack has resolved. Diet discussed  Follow up with Rheumatologist - Dr. Goldberg in  in 2 days - call for appointment

## 2017-11-27 NOTE — DISCHARGE NOTE ADULT - PLAN OF CARE
Complete resolution NO ALCOHOL, RED MEAT - diet as discussed  Follow up with Rheumatologist - Dr. Goldberg in 2 days - call for appointment  Seek medical help for increase in pain, fever, chills, worsening swelling and redness

## 2017-11-27 NOTE — PROGRESS NOTE ADULT - ASSESSMENT
pt w/ b/l ankle pain / swelling / inabiltity to walk  likely severe gouty attack / acute arthritic flare  septic joint is ruled out   f/u aspiration   id eval noted  rheum eval noted  analgesics  dvt proph  iv solumedrol x 1 now  cont toradol po as outpt and f/u w/ dr avi goldberg rheumatology

## 2017-11-30 LAB
CULTURE RESULTS: SIGNIFICANT CHANGE UP
SPECIMEN SOURCE: SIGNIFICANT CHANGE UP

## 2019-02-06 NOTE — DISCHARGE NOTE ADULT - CARE PROVIDER_API CALL
4
Goldberg, Avram Z (MD), Internal Medicine; Rheumatology  [address] [facility]  [place] [address]  Phone: [phone]  Fax: [phone]

## 2019-02-09 ENCOUNTER — TRANSCRIPTION ENCOUNTER (OUTPATIENT)
Age: 49
End: 2019-02-09

## 2019-02-09 ENCOUNTER — APPOINTMENT (OUTPATIENT)
Dept: ORTHOPEDIC SURGERY | Facility: CLINIC | Age: 49
End: 2019-02-09
Payer: COMMERCIAL

## 2019-02-09 VITALS
HEIGHT: 73 IN | SYSTOLIC BLOOD PRESSURE: 148 MMHG | HEART RATE: 72 BPM | BODY MASS INDEX: 32.07 KG/M2 | TEMPERATURE: 98.1 F | WEIGHT: 242 LBS | DIASTOLIC BLOOD PRESSURE: 89 MMHG

## 2019-02-09 PROCEDURE — 73564 X-RAY EXAM KNEE 4 OR MORE: CPT | Mod: TC,RT

## 2019-02-09 PROCEDURE — 20610 DRAIN/INJ JOINT/BURSA W/O US: CPT | Mod: RT

## 2019-02-09 PROCEDURE — 99203 OFFICE O/P NEW LOW 30 MIN: CPT | Mod: 25

## 2019-02-09 NOTE — HISTORY OF PRESENT ILLNESS
[Pain Location] : pain [de-identified] : Patient is a 48 year old male who presents c/o right knee pain and swelling. patient states had episode few weeks ago where knee became painful and swollen which resolved with rest. patient states did yoga class this week, no injury during yoga however afterwards knee became swollen and painful again. patient admits to clicking when extending knee. no locking or buckling. patient states pain is diffuse. no fevers, no chills

## 2019-02-09 NOTE — PHYSICAL EXAM
[Cane] : ambulates with cane [UE/LE] : Sensory: Intact in bilateral upper & lower extremities [ALL] : dorsalis pedis, posterior tibial, femoral, popliteal, and radial 2+ and symmetric bilaterally [Normal] : Oriented to person, place, and time, insight and judgement were intact and the affect was normal [Poor Appearance] : well-appearing [Acute Distress] : not in acute distress [de-identified] : \par FROM to hip\par \par Skin Warm, Dry, no erythema, no lesions \par \par Knee \par stable\par anatomic alignment\par ROM 0-90\par Valgus/Varus Stress intact \par Effusion - NeModerate\par Crepitus - mild \par Patella Grind - Negative \par Patella Apprehension - Negative \par Medial joint line tenderness - Negative\par Lateral Joint line tenderness - Negative\par Lachman test - Negative \par \par Distal Motor Function intact \par Sensation intact to light touch bilaterally \par Pulses 2+ bilaterally\par  [de-identified] : 4 view right knee xray reveals no acute findings

## 2019-02-09 NOTE — DISCUSSION/SUMMARY
[de-identified] : Discussion had with patient \par Patient will follow up with sports after PT \par Patient aware must follow up with MD for further eval and treatment \par Patient will start Physical Therapy \par Patients questions were answered and patient is satisfied with today's visit\par

## 2019-02-09 NOTE — REASON FOR VISIT
[Initial Visit] : an initial visit for [Knee Sprain] : knee sprain [FreeTextEntry2] : Right knee pain

## 2019-02-09 NOTE — PROCEDURE
[de-identified] : Discussed at length with the patient the planned steroid and lidocaine injection. The risks, benefits, convalescence and alternatives were reviewed. The possible side effects discussed included but were not limited to: pain, swelling, heat and redness. There symptoms are generally mild but if they are extensive then contact the office. Giving pain relievers by mouth such as NSAID’s or Tylenol can generally treat the reactions to  steroid and lidocaine. Rare cases of infection have been noted. Rash, hives and itching may occur post injection. If you have muscle pain or cramps, flushing and or swelling of the face, rapid heart beat, nausea, dizziness, fever, chills, headache, difficulty breathing, swelling in the arms or legs, or have a prickly feeling of your skin, contact a health care provider immediately.\par \par Following this discussion, the knee was prepped with betadine and under sterile conditions.  Knee was aspirated with 80 cc of synovial fluid followed by 9 cc of 1% lidocaine and 40 mg of depromedrol was injected with a 18 guage needle. The needle was introduced into the joint, aspiration was performed to ensure intra-articular placement and the medication was injected. Upon withdrawal of the needle the site was cleaned with alcohol and a bandaid applied. The patient tolerated the injection well and there were no adverse effects. Post injection instructions included no strenuous activity for 24 hours, cryotherapy and if there are any adverse effects to contact the office.\par

## 2019-03-04 ENCOUNTER — TRANSCRIPTION ENCOUNTER (OUTPATIENT)
Age: 49
End: 2019-03-04

## 2019-04-09 ENCOUNTER — APPOINTMENT (OUTPATIENT)
Dept: ORTHOPEDIC SURGERY | Facility: CLINIC | Age: 49
End: 2019-04-09
Payer: COMMERCIAL

## 2019-04-09 PROCEDURE — 99214 OFFICE O/P EST MOD 30 MIN: CPT

## 2019-04-09 NOTE — HISTORY OF PRESENT ILLNESS
[de-identified] : Patient is here for right knee pain/swelling that began about 4 months ago after a yoga class. He states that he does not recall anything in particular happening during the class but an hour afterwards he was in extreme pain and his knee was swollen. He went to [facility] where he had an aspiration and cortisone injection of his knee. He was recommended to go to PT but went to a chiropractor, motion doctor, and a massage therapist instead. He does not take medication to treat this pain. He has a history of gout attack a year ago. He is not on any preventive medications.  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 He experiences pain when he rises from a seated position.  Denies N/T/R.

## 2019-04-09 NOTE — PHYSICAL EXAM
[de-identified] : Constitutional: Well-nourished, well-developed, No acute distress\par Respiratory:  Good respiratory effort, no SOB\par Lymphatic: No regional lymphadenopathy, no lymphedema\par Psychiatric: Pleasant and normal affect, alert and oriented x3\par Skin: Clean dry and intact B/L UE/LE\par Musculoskeletal: normal except where as noted in regional exam\par \par B/L Hips: No asymmetry, malalignment, or swelling, Full ROM, 5/5 strength in flexion/ext, IR/ER, Abd/Add, Joints stable\par B/L Ankles: No asymmetry, malalignment, or swelling, Full ROM, 5/5 strength in DF/PF/Inv/Ev, Joints stable\par \par BIOMECHANICAL EXAM: no marked leg length discrepancy, moderate hip abductor weakness b/l, no marked pes planus or foot pronation, tight hams and ITB b/l.  Normal gait and station\par \par Left Knee:\par APPEARANCE: no marked deformities, no swelling or malalignment\par POSITIVE TENDERNESS:  + crepitus of the anterior knee, and tenderness of patellar retinaculum\par NONTENDER: jt lines b/l, patellar & quadriceps tendons, MCL/LCL, ITB at the lateral femoral condyle & Gerdy's tubercle, pes bursa. \par ROM: full & painless, although some discomfort in deep knee flexion\par RESISTIVE TESTING: + discomfort with knee ext from deep knee flexion (stretched position), painless knee flexion. \par SPECIAL TESTS: stable v/v stress. painless grind. neg Lachman's. neg ant/post drawer. neg Atilio's. neg Thessaly test. neg Laureen's & Malacrae's\par NEURO: Normal sensation of LE, DTRs 2+/4 patella and achilles\par PULSES: 2+ DP/PT pulses\par \par Right Knee:\par APPEARANCE: no marked deformities, minimal swelling, no malalignment\par POSITIVE TENDERNESS:  + crepitus of the anterior knee, and tenderness of patellar retinaculum\par NONTENDER: jt lines b/l, patellar & quadriceps tendons, MCL/LCL, ITB at the lateral femoral condyle & Gerdy's tubercle, pes bursa. \par ROM: full & painless, although some discomfort in deep knee flexion\par RESISTIVE TESTING: + discomfort with knee ext from deep knee flexion (stretched position), painless knee flexion. \par SPECIAL TESTS: stable v/v stress. painless grind. neg Lachman's. neg ant/post drawer. neg Atilio's. neg Thessaly test. neg Laureen's & Malacrae's\par NEURO: Normal sensation of LE, DTRs 2+/4 patella and achilles\par PULSES: 2+ DP/PT pulses\par  [de-identified] : I reviewed and clinically correlated the following outside imaging studies,

## 2019-04-09 NOTE — DISCUSSION/SUMMARY
[de-identified] : Patient was seen today for evaluation of persistent right knee pain. He was last seen by one of our physicians assistants in February of this year where he had 80 cc removed from his knee, and was given a cortisone injection. This helped alleviate his pain for several weeks, however he is having persisting knee pain and inability to return to his preferred activity of walking and yoga. This has been under the care of 2 chiropractors and doing some movement analysis and other adjustments on his knee to help realign and stabilize. He has not done any formal physical therapy as of yet. At this time recommend advance imaging with MRI to evaluate for any cartilage or other intra-articular injury. If negative, hysterectomy recommended to undergo a course of physical therapy as well as have a consultation with a rheumatologist. Patient will follow up once MRI results are available.  Patient appreciates and agrees with current plan.\par \par This note was generated using dragon medical dictation software.  A reasonable effort has been made for proofreading its contents, but typos may still remain.  If there are any questions or points of clarification needed please notify my office.

## 2019-04-21 ENCOUNTER — FORM ENCOUNTER (OUTPATIENT)
Age: 49
End: 2019-04-21

## 2019-04-22 ENCOUNTER — APPOINTMENT (OUTPATIENT)
Dept: MRI IMAGING | Facility: CLINIC | Age: 49
End: 2019-04-22
Payer: COMMERCIAL

## 2019-04-22 ENCOUNTER — OUTPATIENT (OUTPATIENT)
Dept: OUTPATIENT SERVICES | Facility: HOSPITAL | Age: 49
LOS: 1 days | End: 2019-04-22

## 2019-04-22 DIAGNOSIS — M25.561 PAIN IN RIGHT KNEE: ICD-10-CM

## 2019-04-22 PROCEDURE — 73721 MRI JNT OF LWR EXTRE W/O DYE: CPT | Mod: 26,RT

## 2019-04-30 ENCOUNTER — CLINICAL ADVICE (OUTPATIENT)
Age: 49
End: 2019-04-30

## 2019-05-06 ENCOUNTER — APPOINTMENT (OUTPATIENT)
Dept: ORTHOPEDIC SURGERY | Facility: CLINIC | Age: 49
End: 2019-05-06
Payer: COMMERCIAL

## 2019-05-06 VITALS
SYSTOLIC BLOOD PRESSURE: 149 MMHG | HEART RATE: 68 BPM | BODY MASS INDEX: 32.47 KG/M2 | HEIGHT: 73 IN | WEIGHT: 245 LBS | DIASTOLIC BLOOD PRESSURE: 81 MMHG

## 2019-05-06 PROCEDURE — 99214 OFFICE O/P EST MOD 30 MIN: CPT

## 2019-05-16 ENCOUNTER — OUTPATIENT (OUTPATIENT)
Dept: OUTPATIENT SERVICES | Facility: HOSPITAL | Age: 49
LOS: 1 days | End: 2019-05-16
Payer: COMMERCIAL

## 2019-05-16 VITALS
SYSTOLIC BLOOD PRESSURE: 154 MMHG | OXYGEN SATURATION: 96 % | TEMPERATURE: 99 F | WEIGHT: 250 LBS | RESPIRATION RATE: 18 BRPM | HEIGHT: 71 IN | DIASTOLIC BLOOD PRESSURE: 98 MMHG | HEART RATE: 65 BPM

## 2019-05-16 DIAGNOSIS — S83.241A OTHER TEAR OF MEDIAL MENISCUS, CURRENT INJURY, RIGHT KNEE, INITIAL ENCOUNTER: ICD-10-CM

## 2019-05-16 LAB
ANION GAP SERPL CALC-SCNC: 13 MMO/L — SIGNIFICANT CHANGE UP (ref 7–14)
BUN SERPL-MCNC: 14 MG/DL — SIGNIFICANT CHANGE UP (ref 7–23)
CALCIUM SERPL-MCNC: 9.7 MG/DL — SIGNIFICANT CHANGE UP (ref 8.4–10.5)
CHLORIDE SERPL-SCNC: 99 MMOL/L — SIGNIFICANT CHANGE UP (ref 98–107)
CO2 SERPL-SCNC: 29 MMOL/L — SIGNIFICANT CHANGE UP (ref 22–31)
CREAT SERPL-MCNC: 1.01 MG/DL — SIGNIFICANT CHANGE UP (ref 0.5–1.3)
GLUCOSE SERPL-MCNC: 89 MG/DL — SIGNIFICANT CHANGE UP (ref 70–99)
HCT VFR BLD CALC: 44.8 % — SIGNIFICANT CHANGE UP (ref 39–50)
HGB BLD-MCNC: 15.4 G/DL — SIGNIFICANT CHANGE UP (ref 13–17)
MCHC RBC-ENTMCNC: 31.1 PG — SIGNIFICANT CHANGE UP (ref 27–34)
MCHC RBC-ENTMCNC: 34.4 % — SIGNIFICANT CHANGE UP (ref 32–36)
MCV RBC AUTO: 90.5 FL — SIGNIFICANT CHANGE UP (ref 80–100)
NRBC # FLD: 0 K/UL — SIGNIFICANT CHANGE UP (ref 0–0)
PLATELET # BLD AUTO: 198 K/UL — SIGNIFICANT CHANGE UP (ref 150–400)
PMV BLD: 11.4 FL — SIGNIFICANT CHANGE UP (ref 7–13)
POTASSIUM SERPL-MCNC: 3.4 MMOL/L — LOW (ref 3.5–5.3)
POTASSIUM SERPL-SCNC: 3.4 MMOL/L — LOW (ref 3.5–5.3)
RBC # BLD: 4.95 M/UL — SIGNIFICANT CHANGE UP (ref 4.2–5.8)
RBC # FLD: 12.4 % — SIGNIFICANT CHANGE UP (ref 10.3–14.5)
SODIUM SERPL-SCNC: 141 MMOL/L — SIGNIFICANT CHANGE UP (ref 135–145)
WBC # BLD: 7.3 K/UL — SIGNIFICANT CHANGE UP (ref 3.8–10.5)
WBC # FLD AUTO: 7.3 K/UL — SIGNIFICANT CHANGE UP (ref 3.8–10.5)

## 2019-05-16 PROCEDURE — 93010 ELECTROCARDIOGRAM REPORT: CPT | Mod: 76

## 2019-05-16 RX ORDER — IBUPROFEN AND FAMOTIDINE 26.6; 8 MG/1; MG/1
0 TABLET, FILM COATED ORAL
Qty: 0 | Refills: 0 | DISCHARGE

## 2019-05-16 RX ORDER — IBUPROFEN 200 MG
2 TABLET ORAL
Qty: 0 | Refills: 0 | DISCHARGE

## 2019-05-16 NOTE — H&P PST ADULT - MUSCULOSKELETAL
detailed exam no joint warmth/no calf tenderness/normal strength/ROM intact/no joint swelling/no joint erythema details…

## 2019-05-16 NOTE — H&P PST ADULT - NEGATIVE NEUROLOGICAL SYMPTOMS
no weakness/no paresthesias/no generalized seizures/no loss of sensation/no loss of consciousness/no vertigo/no difficulty walking/no confusion/no transient paralysis/no focal seizures/no tremors/no headache/no syncope

## 2019-05-16 NOTE — H&P PST ADULT - NEGATIVE MUSCULOSKELETAL SYMPTOMS
no arthritis/no muscle cramps/no arm pain L/no arm pain R/no neck pain/no back pain/no muscle weakness

## 2019-05-16 NOTE — H&P PST ADULT - HISTORY OF PRESENT ILLNESS
Patient is a 48 year old male with a history of Chronic right knee pain, worsening in the past 3-4 months. Patient reports s/p drainage at [facility] in January 2019. Patient is s/p X-ray and MRI of the right knee with abnormal results. Patient was referred to Dr. Gupta for an evaluation. Pre op diagnosis: Other tear of medial meniscus current injury, right, initial encounter, Other tear of lateral meniscus, current injury, right knee, initial encounter. Patient is scheduled for Right Knee arthroscopy, partial meniscectomy scheduled on  5/28/2019. Patient is a 48 year old male with a history of Chronic right knee pain, worsening in the past 3-4 months. Patient reports s/p drainage at [facility] in January 2019. Patient is s/p X-ray and MRI of the right knee with abnormal results. Patient was referred to Dr. Gupta for an evaluation. Pre op diagnosis: Other tear of medial meniscus current injury, right, initial encounter, Other tear of lateral meniscus, current injury, right knee, initial encounter. Patient is scheduled for Right Knee arthroscopy, partial meniscectomy scheduled on  5/28/2019.     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

## 2019-05-16 NOTE — H&P PST ADULT - MUSCULOSKELETAL COMMENTS
Pre op diagnosis: Other tear of medial meniscus current injury, right, initial encounter, Other tear of lateral meniscus, current injury, right knee, initial encounter. Right knee pain

## 2019-05-16 NOTE — H&P PST ADULT - NSICDXPASTMEDICALHX_GEN_ALL_CORE_FT
PAST MEDICAL HISTORY:  Gout     Hypertension Patient with a history of Hypertension, patient reports has not take BP medications in the past 2 years    Other tear of lateral meniscus as current injury     Other tear of medial meniscus, current injury, right knee, initial encounter

## 2019-05-16 NOTE — H&P PST ADULT - NEGATIVE OPHTHALMOLOGIC SYMPTOMS
no diplopia/no discharge L/no blurred vision R/no pain L/no irritation L/no discharge R/no irritation R/no blurred vision L/no pain R/no photophobia

## 2019-05-16 NOTE — H&P PST ADULT - NEGATIVE GASTROINTESTINAL SYMPTOMS
no abdominal pain/no vomiting/no constipation/no diarrhea/no nausea/no change in bowel habits/no melena

## 2019-05-16 NOTE — H&P PST ADULT - RS GEN PE MLT RESP DETAILS PC
no rales/no wheezes/good air movement/clear to auscultation bilaterally/breath sounds equal/no rhonchi/respirations non-labored/airway patent

## 2019-05-16 NOTE — H&P PST ADULT - ASSESSMENT
Pre op diagnosis: Other tear of medial meniscus current injury, right, initial encounter, Other tear of lateral meniscus, current injury, right knee, initial encounter. Patient is scheduled for Right Knee arthroscopy, partial meniscectomy scheduled on  5/28/2019.

## 2019-05-16 NOTE — H&P PST ADULT - NEGATIVE GENERAL GENITOURINARY SYMPTOMS
normal urinary frequency/no dysuria/no flank pain R/no incontinence/no flank pain L/no urine discoloration/no hematuria/no bladder infections/no urinary hesitancy

## 2019-05-16 NOTE — H&P PST ADULT - NSICDXPROBLEM_GEN_ALL_CORE_FT
PROBLEM DIAGNOSES  Problem: Other tear of medial meniscus, current injury, right knee, initial encounter  Assessment and Plan: Patient is scheduled for Right Knee arthroscopy, partial meniscectomy scheduled on  5/28/2019.   Preop instructions, pepcid, surgical scrub provided. Pt stated understanding.  Pending medical evaluation per surgeon and PST, Patient with elevated BP-154/98, Patient is asymptomatic- Reports has an appointment on 5/22/2019.

## 2019-05-16 NOTE — H&P PST ADULT - NSANTHOSAYNRD_GEN_A_CORE
No. HIEN screening performed.  STOP BANG Legend: 0-2 = LOW Risk; 3-4 = INTERMEDIATE Risk; 5-8 = HIGH Risk

## 2019-05-16 NOTE — H&P PST ADULT - NEGATIVE BREAST SYMPTOMS
no breast lump R/no nipple discharge L/no nipple discharge R/no breast tenderness L/no breast lump L/no breast tenderness R

## 2019-05-16 NOTE — H&P PST ADULT - NEGATIVE ENMT SYMPTOMS
no hearing difficulty/no nasal congestion/no nasal obstruction/no nose bleeds/no recurrent cold sores/no dysphagia/no ear pain/no dry mouth/no throat pain/no tinnitus/no sinus symptoms/no nasal discharge/no vertigo/no post-nasal discharge

## 2019-05-27 ENCOUNTER — TRANSCRIPTION ENCOUNTER (OUTPATIENT)
Age: 49
End: 2019-05-27

## 2019-05-28 ENCOUNTER — APPOINTMENT (OUTPATIENT)
Dept: ORTHOPEDIC SURGERY | Facility: AMBULATORY SURGERY CENTER | Age: 49
End: 2019-05-28

## 2019-05-28 ENCOUNTER — OUTPATIENT (OUTPATIENT)
Dept: OUTPATIENT SERVICES | Facility: HOSPITAL | Age: 49
LOS: 1 days | Discharge: HOME | End: 2019-05-28
Payer: COMMERCIAL

## 2019-05-28 VITALS
SYSTOLIC BLOOD PRESSURE: 144 MMHG | DIASTOLIC BLOOD PRESSURE: 74 MMHG | RESPIRATION RATE: 15 BRPM | HEART RATE: 65 BPM | OXYGEN SATURATION: 96 % | TEMPERATURE: 98 F

## 2019-05-28 VITALS
HEIGHT: 71 IN | HEART RATE: 80 BPM | TEMPERATURE: 98 F | SYSTOLIC BLOOD PRESSURE: 141 MMHG | WEIGHT: 250 LBS | OXYGEN SATURATION: 96 % | DIASTOLIC BLOOD PRESSURE: 83 MMHG | RESPIRATION RATE: 18 BRPM

## 2019-05-28 DIAGNOSIS — S83.241A OTHER TEAR OF MEDIAL MENISCUS, CURRENT INJURY, RIGHT KNEE, INITIAL ENCOUNTER: ICD-10-CM

## 2019-05-28 PROCEDURE — 29880 ARTHRS KNE SRG MNISECTMY M&L: CPT | Mod: RT

## 2019-05-28 RX ORDER — IBUPROFEN 200 MG
2 TABLET ORAL
Qty: 0 | Refills: 0 | DISCHARGE

## 2019-05-28 NOTE — ASU DISCHARGE PLAN (ADULT/PEDIATRIC) - CALL YOUR DOCTOR IF YOU HAVE ANY OF THE FOLLOWING:
Wound/Surgical Site with redness, or foul smelling discharge or pus Numbness, tingling, color or temperature change to extremity/Swelling that gets worse/Bleeding that does not stop/Wound/Surgical Site with redness, or foul smelling discharge or pus/Nausea and vomiting that does not stop

## 2019-05-28 NOTE — ASU DISCHARGE PLAN (ADULT/PEDIATRIC) - CARE PROVIDER_API CALL
Kevin Gupta)  Orthopaedic Sports Medicine; Orthopaedic Surgery  [address]  [place] [address]  Phone: [phone]  Fax: [phone]  Follow Up Time: 1 week

## 2019-05-28 NOTE — ASU DISCHARGE PLAN (ADULT/PEDIATRIC) - ASU DC SPECIAL INSTRUCTIONSFT
Take Percocet for severe pain only. Take an anti-inflammatory (ibuprofen, aleve, advil, naproxen) around the clock if you have pain and take the percocet if you have pain on top of that. If you are not taking percocet, can take Tylenol as well as an anti-inflammatory    Narcotic pain medicine can cause extreme nausea and constipation. Drink plenty of water and take diuretics (colace, Miralax) as needed. You can get them from your local pharmacy.    It is important to ice and elevate your leg to keep swelling down and the pain manageable. Keep the ice on for 20 minutes, and then keep off for 20 minutes. Repeat while awake.

## 2019-06-05 ENCOUNTER — APPOINTMENT (OUTPATIENT)
Dept: ORTHOPEDIC SURGERY | Facility: CLINIC | Age: 49
End: 2019-06-05
Payer: COMMERCIAL

## 2019-06-05 DIAGNOSIS — M25.461 EFFUSION, RIGHT KNEE: ICD-10-CM

## 2019-06-05 PROBLEM — S83.289A OTHER TEAR OF LATERAL MENISCUS, CURRENT INJURY, UNSPECIFIED KNEE, INITIAL ENCOUNTER: Chronic | Status: ACTIVE | Noted: 2019-05-16

## 2019-06-05 PROBLEM — I10 ESSENTIAL (PRIMARY) HYPERTENSION: Chronic | Status: ACTIVE | Noted: 2019-05-16

## 2019-06-05 PROBLEM — S83.241A OTHER TEAR OF MEDIAL MENISCUS, CURRENT INJURY, RIGHT KNEE, INITIAL ENCOUNTER: Chronic | Status: ACTIVE | Noted: 2019-05-16

## 2019-06-05 PROBLEM — M10.9 GOUT, UNSPECIFIED: Chronic | Status: ACTIVE | Noted: 2019-05-16

## 2019-06-05 PROCEDURE — 99024 POSTOP FOLLOW-UP VISIT: CPT

## 2019-06-05 NOTE — HISTORY OF PRESENT ILLNESS
[___ Days Post Op] : post op day #[unfilled] [Clean/Dry/Intact] : clean, dry and intact [Vascular Intact] : ~T peripheral vascular exam normal [Neuro Intact] : an unremarkable neurological exam [Sutures Removed] : sutures were removed [Steri-Strips Removed & Replaced] : steri-strips removed and replaced [Erythema] : not erythematous [Discharge] : absent of discharge [Dehiscence] : not dehisced [de-identified] : Post  right knee arthroscopic partial medial and lateral meniscectomies; right knee arthroscopic limited chondroplasty  of trochlea 5/28/19 [de-identified] : Large effusion right knee joint. Able to perform lotion a straight leg raise. Walking without assistive aids. No calf tenderness. [de-identified] : Patient has been recovering well. He is no longer taking post op pain medications. He denies adverse post op events  [de-identified] : Right knee joint aspirated. Advised on ice and use of NSAIDs. Indocin prescribed Followup in 3 weeks

## 2019-06-05 NOTE — PROCEDURE
[de-identified] : The right knee was sterilely cleansed with alcohol and Betadine swabs. The knee joint was then aspirated for 80 cc joint fluid. The fluid appeared serosanguineous. Instructions were given to apply ice to the site if there is post injection site soreness.

## 2019-07-05 ENCOUNTER — TRANSCRIPTION ENCOUNTER (OUTPATIENT)
Age: 49
End: 2019-07-05

## 2019-07-10 ENCOUNTER — APPOINTMENT (OUTPATIENT)
Dept: ORTHOPEDIC SURGERY | Facility: CLINIC | Age: 49
End: 2019-07-10
Payer: COMMERCIAL

## 2019-07-10 DIAGNOSIS — M25.561 PAIN IN RIGHT KNEE: ICD-10-CM

## 2019-07-10 PROCEDURE — 99024 POSTOP FOLLOW-UP VISIT: CPT

## 2019-11-05 ENCOUNTER — RESULT REVIEW (OUTPATIENT)
Age: 49
End: 2019-11-05

## 2019-11-22 ENCOUNTER — TRANSCRIPTION ENCOUNTER (OUTPATIENT)
Age: 49
End: 2019-11-22

## 2019-11-27 ENCOUNTER — TRANSCRIPTION ENCOUNTER (OUTPATIENT)
Age: 49
End: 2019-11-27

## 2020-03-05 ENCOUNTER — TRANSCRIPTION ENCOUNTER (OUTPATIENT)
Age: 50
End: 2020-03-05

## 2020-04-25 ENCOUNTER — MESSAGE (OUTPATIENT)
Age: 50
End: 2020-04-25

## 2020-08-20 ENCOUNTER — INPATIENT (INPATIENT)
Facility: HOSPITAL | Age: 50
LOS: 2 days | Discharge: HOME | DRG: 246 | End: 2020-08-23
Attending: INTERNAL MEDICINE | Admitting: HOSPITALIST
Payer: COMMERCIAL

## 2020-08-20 ENCOUNTER — EMERGENCY (EMERGENCY)
Facility: HOSPITAL | Age: 50
LOS: 1 days | Discharge: ANOTHER HOSPITAL | End: 2020-08-20
Attending: EMERGENCY MEDICINE | Admitting: EMERGENCY MEDICINE
Payer: COMMERCIAL

## 2020-08-20 VITALS
RESPIRATION RATE: 18 BRPM | OXYGEN SATURATION: 98 % | TEMPERATURE: 98 F | DIASTOLIC BLOOD PRESSURE: 87 MMHG | HEART RATE: 89 BPM | SYSTOLIC BLOOD PRESSURE: 152 MMHG

## 2020-08-20 VITALS
DIASTOLIC BLOOD PRESSURE: 102 MMHG | SYSTOLIC BLOOD PRESSURE: 196 MMHG | WEIGHT: 244.93 LBS | TEMPERATURE: 98 F | HEART RATE: 53 BPM | RESPIRATION RATE: 18 BRPM | HEIGHT: 73 IN | OXYGEN SATURATION: 97 %

## 2020-08-20 DIAGNOSIS — Z98.890 OTHER SPECIFIED POSTPROCEDURAL STATES: Chronic | ICD-10-CM

## 2020-08-20 LAB
ALBUMIN SERPL ELPH-MCNC: 3.7 G/DL — SIGNIFICANT CHANGE UP (ref 3.3–5)
ALP SERPL-CCNC: 79 U/L — SIGNIFICANT CHANGE UP (ref 40–120)
ALT FLD-CCNC: 43 U/L — SIGNIFICANT CHANGE UP (ref 12–78)
ANION GAP SERPL CALC-SCNC: 4 MMOL/L — LOW (ref 5–17)
APTT BLD: 32.3 SEC — SIGNIFICANT CHANGE UP (ref 27.5–35.5)
AST SERPL-CCNC: 29 U/L — SIGNIFICANT CHANGE UP (ref 15–37)
BASOPHILS # BLD AUTO: 0.06 K/UL — SIGNIFICANT CHANGE UP (ref 0–0.2)
BASOPHILS NFR BLD AUTO: 0.9 % — SIGNIFICANT CHANGE UP (ref 0–2)
BILIRUB SERPL-MCNC: 0.3 MG/DL — SIGNIFICANT CHANGE UP (ref 0.2–1.2)
BUN SERPL-MCNC: 13 MG/DL — SIGNIFICANT CHANGE UP (ref 7–23)
CALCIUM SERPL-MCNC: 8.9 MG/DL — SIGNIFICANT CHANGE UP (ref 8.5–10.1)
CHLORIDE SERPL-SCNC: 107 MMOL/L — SIGNIFICANT CHANGE UP (ref 96–108)
CK SERPL-CCNC: 259 U/L — SIGNIFICANT CHANGE UP (ref 26–308)
CO2 SERPL-SCNC: 29 MMOL/L — SIGNIFICANT CHANGE UP (ref 22–31)
CREAT SERPL-MCNC: 1.1 MG/DL — SIGNIFICANT CHANGE UP (ref 0.5–1.3)
D DIMER BLD IA.RAPID-MCNC: <150 NG/ML DDU — SIGNIFICANT CHANGE UP
EOSINOPHIL # BLD AUTO: 0.32 K/UL — SIGNIFICANT CHANGE UP (ref 0–0.5)
EOSINOPHIL NFR BLD AUTO: 4.6 % — SIGNIFICANT CHANGE UP (ref 0–6)
GLUCOSE SERPL-MCNC: 108 MG/DL — HIGH (ref 70–99)
HCT VFR BLD CALC: 42.5 % — SIGNIFICANT CHANGE UP (ref 39–50)
HGB BLD-MCNC: 14.7 G/DL — SIGNIFICANT CHANGE UP (ref 13–17)
IMM GRANULOCYTES NFR BLD AUTO: 0.3 % — SIGNIFICANT CHANGE UP (ref 0–1.5)
INR BLD: 1.01 RATIO — SIGNIFICANT CHANGE UP (ref 0.88–1.16)
LYMPHOCYTES # BLD AUTO: 2.72 K/UL — SIGNIFICANT CHANGE UP (ref 1–3.3)
LYMPHOCYTES # BLD AUTO: 39.1 % — SIGNIFICANT CHANGE UP (ref 13–44)
MCHC RBC-ENTMCNC: 30.6 PG — SIGNIFICANT CHANGE UP (ref 27–34)
MCHC RBC-ENTMCNC: 34.6 GM/DL — SIGNIFICANT CHANGE UP (ref 32–36)
MCV RBC AUTO: 88.4 FL — SIGNIFICANT CHANGE UP (ref 80–100)
MONOCYTES # BLD AUTO: 0.67 K/UL — SIGNIFICANT CHANGE UP (ref 0–0.9)
MONOCYTES NFR BLD AUTO: 9.6 % — SIGNIFICANT CHANGE UP (ref 2–14)
NEUTROPHILS # BLD AUTO: 3.17 K/UL — SIGNIFICANT CHANGE UP (ref 1.8–7.4)
NEUTROPHILS NFR BLD AUTO: 45.5 % — SIGNIFICANT CHANGE UP (ref 43–77)
NRBC # BLD: 0 /100 WBCS — SIGNIFICANT CHANGE UP (ref 0–0)
NT-PROBNP SERPL-SCNC: 83 PG/ML — SIGNIFICANT CHANGE UP (ref 0–125)
PLATELET # BLD AUTO: 197 K/UL — SIGNIFICANT CHANGE UP (ref 150–400)
POTASSIUM SERPL-MCNC: 3.7 MMOL/L — SIGNIFICANT CHANGE UP (ref 3.5–5.3)
POTASSIUM SERPL-SCNC: 3.7 MMOL/L — SIGNIFICANT CHANGE UP (ref 3.5–5.3)
PROT SERPL-MCNC: 7.3 G/DL — SIGNIFICANT CHANGE UP (ref 6–8.3)
PROTHROM AB SERPL-ACNC: 11.8 SEC — SIGNIFICANT CHANGE UP (ref 10.6–13.6)
RBC # BLD: 4.81 M/UL — SIGNIFICANT CHANGE UP (ref 4.2–5.8)
RBC # FLD: 12 % — SIGNIFICANT CHANGE UP (ref 10.3–14.5)
SODIUM SERPL-SCNC: 140 MMOL/L — SIGNIFICANT CHANGE UP (ref 135–145)
TROPONIN I SERPL-MCNC: 0.3 NG/ML — HIGH (ref 0.01–0.04)
WBC # BLD: 6.96 K/UL — SIGNIFICANT CHANGE UP (ref 3.8–10.5)
WBC # FLD AUTO: 6.96 K/UL — SIGNIFICANT CHANGE UP (ref 3.8–10.5)

## 2020-08-20 PROCEDURE — 71045 X-RAY EXAM CHEST 1 VIEW: CPT | Mod: 26

## 2020-08-20 PROCEDURE — 99291 CRITICAL CARE FIRST HOUR: CPT

## 2020-08-20 PROCEDURE — 93010 ELECTROCARDIOGRAM REPORT: CPT

## 2020-08-20 PROCEDURE — L9993: CPT

## 2020-08-20 RX ORDER — HEPARIN SODIUM 5000 [USP'U]/ML
4000 INJECTION INTRAVENOUS; SUBCUTANEOUS ONCE
Refills: 0 | Status: COMPLETED | OUTPATIENT
Start: 2020-08-20 | End: 2020-08-20

## 2020-08-20 RX ORDER — HEPARIN SODIUM 5000 [USP'U]/ML
INJECTION INTRAVENOUS; SUBCUTANEOUS
Qty: 25000 | Refills: 0 | Status: DISCONTINUED | OUTPATIENT
Start: 2020-08-20 | End: 2020-08-24

## 2020-08-20 RX ORDER — CLOPIDOGREL BISULFATE 75 MG/1
300 TABLET, FILM COATED ORAL ONCE
Refills: 0 | Status: COMPLETED | OUTPATIENT
Start: 2020-08-20 | End: 2020-08-20

## 2020-08-20 RX ORDER — ASPIRIN/CALCIUM CARB/MAGNESIUM 324 MG
325 TABLET ORAL ONCE
Refills: 0 | Status: COMPLETED | OUTPATIENT
Start: 2020-08-20 | End: 2020-08-20

## 2020-08-20 RX ORDER — HEPARIN SODIUM 5000 [USP'U]/ML
4000 INJECTION INTRAVENOUS; SUBCUTANEOUS EVERY 6 HOURS
Refills: 0 | Status: DISCONTINUED | OUTPATIENT
Start: 2020-08-20 | End: 2020-08-24

## 2020-08-20 RX ADMIN — Medication 325 MILLIGRAM(S): at 22:51

## 2020-08-20 RX ADMIN — CLOPIDOGREL BISULFATE 300 MILLIGRAM(S): 75 TABLET, FILM COATED ORAL at 22:51

## 2020-08-20 RX ADMIN — HEPARIN SODIUM 1000 UNIT(S)/HR: 5000 INJECTION INTRAVENOUS; SUBCUTANEOUS at 22:58

## 2020-08-20 RX ADMIN — HEPARIN SODIUM 4000 UNIT(S): 5000 INJECTION INTRAVENOUS; SUBCUTANEOUS at 22:58

## 2020-08-20 NOTE — ED PROVIDER NOTE - ATTENDING CONTRIBUTION TO CARE
pt c/o approx 1 hr of midchest heaviness with mild sob. symptoms began at rest. no fevers, chills, cough, d/n/v, abd pain, edema, calf pain, travel.  wd, wn male, nad, perrl, s1s2 rrr, normal pulses, lungs cta, abd soft, nt, ext no calf tenderness, no edema  mdm. pt with cp, sob, STEMI - ekg/cxr/labs/asa/plavix/heparin/xfer for cath

## 2020-08-20 NOTE — ED PROVIDER NOTE - CHPI ED SYMPTOMS NEG
no back pain/no vomiting/no diaphoresis/no nausea/no shortness of breath/no cough/no syncope/no dizziness/no fever/no chills

## 2020-08-20 NOTE — ED PROVIDER NOTE - OTHER FINDINGS
sinus bradycardia, inferior infarct, possible acute, Acute MI/STEMI, consider right ventricular involvement in acute inferior infarct septal infarct, age undertmined, inferior injury pattern, Acute MI/STEMI,  RIGHT SIDE EKG

## 2020-08-20 NOTE — ED ADULT TRIAGE NOTE - CHIEF COMPLAINT QUOTE
Pt was transferred from [facility] for cardiac catheterization   Hx of HTN, with x1 day of chest pain  Pt a&ox3 on arrival, VSS. MD Marmolejo at bedside  Code STEMI initiated, pt transferred to cath lab.

## 2020-08-20 NOTE — ED ADULT NURSE NOTE - NSIMPLEMENTINTERV_GEN_ALL_ED
Implemented All Universal Safety Interventions:  [place] to call system. Call bell, personal items and telephone within reach. Instruct patient to call for assistance. Room bathroom lighting operational. Non-slip footwear when patient is off stretcher. Physically safe environment: no spills, clutter or unnecessary equipment. Stretcher in lowest position, wheels locked, appropriate side rails in place.

## 2020-08-20 NOTE — ED ADULT NURSE NOTE - OBJECTIVE STATEMENT
Patient arrives almost immediately from triage with c/o sudden CP x 1 hour with SOB.  No history of MI.  No numbness, tingling.  Patient noted to be bradycardic.  EKG completed.

## 2020-08-20 NOTE — ED PROVIDER NOTE - OBJECTIVE STATEMENT
50 y male presents with chest "pressure" , states began 1 hour ago.  denies sob,  states he has never had a heart attack, no sob.  denies trauma,  states he has not seen a Dr in a long time.  PMH: Gout    Hypertension  Patient with a history of Hypertension, patient reports has not take BP medications in the past 2 years  Other tear of lateral meniscus as current injury    Other tear of medial meniscus, current injury, right knee, initial encounter

## 2020-08-20 NOTE — ED PROVIDER NOTE - PMH
Gout    Hypertension  Patient with a history of Hypertension, patient reports has not take BP medications in the past 2 years  Other tear of lateral meniscus as current injury    Other tear of medial meniscus, current injury, right knee, initial encounter

## 2020-08-20 NOTE — ED ADULT NURSE NOTE - CHIEF COMPLAINT QUOTE
Pt was transferred from [facility] for cardiac catheterization   Hx of HTN, with x1 day of chest pain  Pt a&ox3 on arrival, VSS. MD Marmolejo at bedside  Code STEMI initiated, pt transferred to cath lab.

## 2020-08-21 DIAGNOSIS — I21.3 ST ELEVATION (STEMI) MYOCARDIAL INFARCTION OF UNSPECIFIED SITE: ICD-10-CM

## 2020-08-21 LAB
A1C WITH ESTIMATED AVERAGE GLUCOSE RESULT: 5.2 % — SIGNIFICANT CHANGE UP (ref 4–5.6)
ALBUMIN SERPL ELPH-MCNC: 4.2 G/DL — SIGNIFICANT CHANGE UP (ref 3.3–5.2)
ALP SERPL-CCNC: 67 U/L — SIGNIFICANT CHANGE UP (ref 40–120)
ALT FLD-CCNC: 35 U/L — SIGNIFICANT CHANGE UP
ANION GAP SERPL CALC-SCNC: 14 MMOL/L — SIGNIFICANT CHANGE UP (ref 5–17)
APTT BLD: >200 SEC — CRITICAL HIGH (ref 27.5–35.5)
AST SERPL-CCNC: 34 U/L — SIGNIFICANT CHANGE UP
BILIRUB SERPL-MCNC: 0.4 MG/DL — SIGNIFICANT CHANGE UP (ref 0.4–2)
BUN SERPL-MCNC: 13 MG/DL — SIGNIFICANT CHANGE UP (ref 8–20)
CALCIUM SERPL-MCNC: 9.1 MG/DL — SIGNIFICANT CHANGE UP (ref 8.6–10.2)
CHLORIDE SERPL-SCNC: 103 MMOL/L — SIGNIFICANT CHANGE UP (ref 98–107)
CHOLEST SERPL-MCNC: 172 MG/DL — SIGNIFICANT CHANGE UP (ref 110–199)
CO2 SERPL-SCNC: 23 MMOL/L — SIGNIFICANT CHANGE UP (ref 22–29)
CREAT SERPL-MCNC: 0.88 MG/DL — SIGNIFICANT CHANGE UP (ref 0.5–1.3)
ESTIMATED AVERAGE GLUCOSE: 103 MG/DL — SIGNIFICANT CHANGE UP (ref 68–114)
GLUCOSE SERPL-MCNC: 119 MG/DL — HIGH (ref 70–99)
HCT VFR BLD CALC: 42.6 % — SIGNIFICANT CHANGE UP (ref 39–50)
HDLC SERPL-MCNC: 49 MG/DL — SIGNIFICANT CHANGE UP
HGB BLD-MCNC: 14.8 G/DL — SIGNIFICANT CHANGE UP (ref 13–17)
INR BLD: 1.15 RATIO — SIGNIFICANT CHANGE UP (ref 0.88–1.16)
LIPID PNL WITH DIRECT LDL SERPL: 115 MG/DL — SIGNIFICANT CHANGE UP
MAGNESIUM SERPL-MCNC: 1.9 MG/DL — SIGNIFICANT CHANGE UP (ref 1.8–2.6)
MCHC RBC-ENTMCNC: 30.9 PG — SIGNIFICANT CHANGE UP (ref 27–34)
MCHC RBC-ENTMCNC: 34.7 GM/DL — SIGNIFICANT CHANGE UP (ref 32–36)
MCV RBC AUTO: 88.9 FL — SIGNIFICANT CHANGE UP (ref 80–100)
PHOSPHATE SERPL-MCNC: 2.4 MG/DL — SIGNIFICANT CHANGE UP (ref 2.4–4.7)
PLATELET # BLD AUTO: 188 K/UL — SIGNIFICANT CHANGE UP (ref 150–400)
POTASSIUM SERPL-MCNC: 4.1 MMOL/L — SIGNIFICANT CHANGE UP (ref 3.5–5.3)
POTASSIUM SERPL-SCNC: 4.1 MMOL/L — SIGNIFICANT CHANGE UP (ref 3.5–5.3)
PROT SERPL-MCNC: 6.6 G/DL — SIGNIFICANT CHANGE UP (ref 6.6–8.7)
PROTHROM AB SERPL-ACNC: 13.2 SEC — SIGNIFICANT CHANGE UP (ref 10.6–13.6)
RBC # BLD: 4.79 M/UL — SIGNIFICANT CHANGE UP (ref 4.2–5.8)
RBC # FLD: 12 % — SIGNIFICANT CHANGE UP (ref 10.3–14.5)
SARS-COV-2 IGG SERPL QL IA: NEGATIVE — SIGNIFICANT CHANGE UP
SARS-COV-2 IGM SERPL IA-ACNC: <0.1 INDEX — SIGNIFICANT CHANGE UP
SARS-COV-2 RNA SPEC QL NAA+PROBE: SIGNIFICANT CHANGE UP
SODIUM SERPL-SCNC: 140 MMOL/L — SIGNIFICANT CHANGE UP (ref 135–145)
TOTAL CHOLESTEROL/HDL RATIO MEASUREMENT: 4 RATIO — SIGNIFICANT CHANGE UP (ref 3.4–9.6)
TRIGL SERPL-MCNC: 38 MG/DL — SIGNIFICANT CHANGE UP (ref 10–200)
TROPONIN T SERPL-MCNC: 0.06 NG/ML — SIGNIFICANT CHANGE UP (ref 0–0.06)
TROPONIN T SERPL-MCNC: 0.06 NG/ML — SIGNIFICANT CHANGE UP (ref 0–0.06)
WBC # BLD: 9.38 K/UL — SIGNIFICANT CHANGE UP (ref 3.8–10.5)
WBC # FLD AUTO: 9.38 K/UL — SIGNIFICANT CHANGE UP (ref 3.8–10.5)

## 2020-08-21 PROCEDURE — 84484 ASSAY OF TROPONIN QUANT: CPT

## 2020-08-21 PROCEDURE — 36415 COLL VENOUS BLD VENIPUNCTURE: CPT

## 2020-08-21 PROCEDURE — 85730 THROMBOPLASTIN TIME PARTIAL: CPT

## 2020-08-21 PROCEDURE — 83880 ASSAY OF NATRIURETIC PEPTIDE: CPT

## 2020-08-21 PROCEDURE — 96374 THER/PROPH/DIAG INJ IV PUSH: CPT

## 2020-08-21 PROCEDURE — 80053 COMPREHEN METABOLIC PANEL: CPT

## 2020-08-21 PROCEDURE — 99291 CRITICAL CARE FIRST HOUR: CPT | Mod: 25

## 2020-08-21 PROCEDURE — 85027 COMPLETE CBC AUTOMATED: CPT

## 2020-08-21 PROCEDURE — 85610 PROTHROMBIN TIME: CPT

## 2020-08-21 PROCEDURE — 71045 X-RAY EXAM CHEST 1 VIEW: CPT | Mod: 26

## 2020-08-21 PROCEDURE — 71045 X-RAY EXAM CHEST 1 VIEW: CPT

## 2020-08-21 PROCEDURE — 93005 ELECTROCARDIOGRAM TRACING: CPT

## 2020-08-21 PROCEDURE — 93010 ELECTROCARDIOGRAM REPORT: CPT

## 2020-08-21 PROCEDURE — 87635 SARS-COV-2 COVID-19 AMP PRB: CPT

## 2020-08-21 PROCEDURE — 82550 ASSAY OF CK (CPK): CPT

## 2020-08-21 PROCEDURE — 85379 FIBRIN DEGRADATION QUANT: CPT

## 2020-08-21 RX ORDER — LISINOPRIL 2.5 MG/1
10 TABLET ORAL DAILY
Refills: 0 | Status: DISCONTINUED | OUTPATIENT
Start: 2020-08-22 | End: 2020-08-23

## 2020-08-21 RX ORDER — TICAGRELOR 90 MG/1
90 TABLET ORAL EVERY 12 HOURS
Refills: 0 | Status: DISCONTINUED | OUTPATIENT
Start: 2020-08-21 | End: 2020-08-23

## 2020-08-21 RX ORDER — HYDRALAZINE HCL 50 MG
10 TABLET ORAL ONCE
Refills: 0 | Status: COMPLETED | OUTPATIENT
Start: 2020-08-21 | End: 2020-08-21

## 2020-08-21 RX ORDER — ACETAMINOPHEN 500 MG
650 TABLET ORAL EVERY 6 HOURS
Refills: 0 | Status: DISCONTINUED | OUTPATIENT
Start: 2020-08-21 | End: 2020-08-23

## 2020-08-21 RX ORDER — LISINOPRIL 2.5 MG/1
5 TABLET ORAL ONCE
Refills: 0 | Status: COMPLETED | OUTPATIENT
Start: 2020-08-21 | End: 2020-08-21

## 2020-08-21 RX ORDER — ATORVASTATIN CALCIUM 80 MG/1
80 TABLET, FILM COATED ORAL AT BEDTIME
Refills: 0 | Status: DISCONTINUED | OUTPATIENT
Start: 2020-08-21 | End: 2020-08-23

## 2020-08-21 RX ORDER — LISINOPRIL 2.5 MG/1
5 TABLET ORAL DAILY
Refills: 0 | Status: DISCONTINUED | OUTPATIENT
Start: 2020-08-21 | End: 2020-08-21

## 2020-08-21 RX ORDER — ONDANSETRON 8 MG/1
4 TABLET, FILM COATED ORAL EVERY 6 HOURS
Refills: 0 | Status: DISCONTINUED | OUTPATIENT
Start: 2020-08-21 | End: 2020-08-23

## 2020-08-21 RX ORDER — LISINOPRIL 2.5 MG/1
5 TABLET ORAL ONCE
Refills: 0 | Status: DISCONTINUED | OUTPATIENT
Start: 2020-08-21 | End: 2020-08-21

## 2020-08-21 RX ORDER — ENOXAPARIN SODIUM 100 MG/ML
40 INJECTION SUBCUTANEOUS DAILY
Refills: 0 | Status: DISCONTINUED | OUTPATIENT
Start: 2020-08-21 | End: 2020-08-21

## 2020-08-21 RX ORDER — ASPIRIN/CALCIUM CARB/MAGNESIUM 324 MG
81 TABLET ORAL DAILY
Refills: 0 | Status: DISCONTINUED | OUTPATIENT
Start: 2020-08-21 | End: 2020-08-23

## 2020-08-21 RX ORDER — HYDRALAZINE HCL 50 MG
10 TABLET ORAL EVERY 4 HOURS
Refills: 0 | Status: DISCONTINUED | OUTPATIENT
Start: 2020-08-21 | End: 2020-08-23

## 2020-08-21 RX ORDER — CHLORHEXIDINE GLUCONATE 213 G/1000ML
1 SOLUTION TOPICAL
Refills: 0 | Status: DISCONTINUED | OUTPATIENT
Start: 2020-08-21 | End: 2020-08-23

## 2020-08-21 RX ORDER — HEPARIN SODIUM 5000 [USP'U]/ML
5000 INJECTION INTRAVENOUS; SUBCUTANEOUS ONCE
Refills: 0 | Status: COMPLETED | OUTPATIENT
Start: 2020-08-21 | End: 2020-08-21

## 2020-08-21 RX ADMIN — TICAGRELOR 90 MILLIGRAM(S): 90 TABLET ORAL at 06:25

## 2020-08-21 RX ADMIN — Medication 10 MILLIGRAM(S): at 03:30

## 2020-08-21 RX ADMIN — HEPARIN SODIUM 5000 UNIT(S): 5000 INJECTION INTRAVENOUS; SUBCUTANEOUS at 16:20

## 2020-08-21 RX ADMIN — ATORVASTATIN CALCIUM 80 MILLIGRAM(S): 80 TABLET, FILM COATED ORAL at 22:42

## 2020-08-21 RX ADMIN — ONDANSETRON 4 MILLIGRAM(S): 8 TABLET, FILM COATED ORAL at 16:14

## 2020-08-21 RX ADMIN — LISINOPRIL 5 MILLIGRAM(S): 2.5 TABLET ORAL at 06:25

## 2020-08-21 RX ADMIN — Medication 650 MILLIGRAM(S): at 04:41

## 2020-08-21 RX ADMIN — ONDANSETRON 4 MILLIGRAM(S): 8 TABLET, FILM COATED ORAL at 04:30

## 2020-08-21 RX ADMIN — Medication 650 MILLIGRAM(S): at 03:41

## 2020-08-21 RX ADMIN — LISINOPRIL 5 MILLIGRAM(S): 2.5 TABLET ORAL at 11:12

## 2020-08-21 RX ADMIN — Medication 81 MILLIGRAM(S): at 11:13

## 2020-08-21 RX ADMIN — CHLORHEXIDINE GLUCONATE 1 APPLICATION(S): 213 SOLUTION TOPICAL at 06:25

## 2020-08-21 NOTE — PROVIDER CONTACT NOTE (EICU) - ACTION/TREATMENT ORDERED:
Care discussed with bedside provider and eICU attending. If any additional assistance in care/management of patient required by bedside team, provider/RN/family member advised to push "e-alert" button in patient's room, and details will be discussed at that time.

## 2020-08-21 NOTE — H&P ADULT - ASSESSMENT
51 y/o M with a h/o gout, on no medications, with STEMI, bradycardia.    Admit to MICU for further management. Case discussed in detail with interventional cardiologist, Dr. Flores.    - s/p 2 TU to RCA, plan for staged PCI to LAD on Monday  - start DAPT with ASA and Brilinta, start DVT prophylaxis with Lovenox 24 hours post-cath  - defer BB for now given right heart infarct and bradycardia  - start low dose ACE-I  - monitor hemodynamics closely, maintain a MAP > 65  - low threshold for repeat EKG  - start high intensity statin  - check lipid panel and Hgba1c in the AM  - EF reported as normal on LV-gram, will need formal TTE

## 2020-08-21 NOTE — PROGRESS NOTE ADULT - SUBJECTIVE AND OBJECTIVE BOX
SUBJECTIVE:  Cardiology NP Pre LHC:  Pt Seen at bedside today in fasting state   HPI:  51 y/o M with a h/o gout, on no medications, transferred from [facility] with STEMI. Noted to have inferior ST-elevations on EKG in the setting of anterior wall non-radiating chest pressure, SOB, and nausea. Bradycardic (HR 40s/50s). He underwent emergent cardiac catheterization here at [facility] which revealed significant occlusions of RCA and LAD. 2 TU were placed to the RCA. EF appeared normal on LV-gram. Post-procedure he continues to endorse chest pressure but much improved. He is sinus bradycardic with HR in the 40s/50s. Mildly hypertensive. (21 Aug 2020 01:36)    Pt had intermittent chest pain through out today pt brought back to cath lab for Dr Pierre and Dr Ngo to intervene on other existing lesions.  chest pain is described as similar to presenting STEMI pain but not as intense.          MEDICATIONS  (STANDING):  aspirin  chewable 81 milliGRAM(s) Oral daily  atorvastatin 80 milliGRAM(s) Oral at bedtime  chlorhexidine 2% Cloths 1 Application(s) Topical <User Schedule>  ticagrelor 90 milliGRAM(s) Oral every 12 hours    MEDICATIONS  (PRN):  acetaminophen   Tablet .. 650 milliGRAM(s) Oral every 6 hours PRN Moderate Pain (4 - 6)  hydrALAZINE Injectable 10 milliGRAM(s) IV Push every 4 hours PRN sbp > 170  ondansetron Injectable 4 milliGRAM(s) IV Push every 6 hours PRN Nausea and/or Vomiting      PHYSICAL EXAM:  T(C): 37.6 (20 @ 15:00), Max: 37.6 (20 @ 15:00)  HR: 52 (20 @ 17:00) (48 - 89)  BP: 159/76 (20 @ 17:00) (138/73 - 177/86)  RR: 20 (20 @ 17:00) (11 - 32)  SpO2: 98% (20 @ 17:00) (94% - 98%)      Daily Weight in k.6 (21 Aug 2020 03:18)    Appearance: Normal	  HEENT:   Normal oral mucosa,   Cardiovascular: Normal S1 S2, No JVD, No murmurs, No edema  Respiratory: Lungs clear to auscultation	  Gastrointestinal:  Soft, Non-tender, + BS	  Skin: warm and dry  Neurologic: Non-focal  Extremities: Normal range of motion,:  Vascular: Peripheral pulses palpable 2+ bilaterally  Right Groin site benign dsg removed                             14.8   9.38  )-----------( 188      ( 21 Aug 2020 03:51 )             42.6     08-    140  |  103  |  13.0  ----------------------------<  119<H>  4.1   |  23.0  |  0.88    Ca    9.1      21 Aug 2020 03:51  Phos  2.4     08-  Mg     1.9     08-    TPro  6.6  /  Alb  4.2  /  TBili  0.4  /  DBili  x   /  AST  34  /  ALT  35  /  AlkPhos  67  08-21    PT/INR - ( 21 Aug 2020 03:51 )   PT: 13.2 sec;   INR: 1.15 ratio         PTT - ( 21 Aug 2020 03:51 )  PTT:>200.0 sec  08- Chol 172  HDL 49 Trig 38          ASSESSMENT:  HPI:  51 y/o M with a h/o gout, on no medications, transferred from [facility] with STEMI. Noted to have inferior ST-elevations on EKG in the setting of anterior wall non-radiating chest pressure, SOB, and nausea. Bradycardic (HR 40s/50s). He underwent emergent cardiac catheterization here at [facility] which revealed significant occlusions of RCA and LAD. 2 TU were placed to the RCA. EF appeared normal on LV-gram. Post-procedure he continues to endorse chest pressure but much improved. He is sinus bradycardic with HR in the 40s/50s. Mildly hypertensive. (21 Aug 2020 01:36)      Plan:   to the cath lab. none

## 2020-08-21 NOTE — PROVIDER CONTACT NOTE (EICU) - BACKGROUND
51 yo M with gout on no meds a/w chest pain found to the inferior wal STEMI on EKG with associated chest pressure and SOB and nausea.  Pt was noted to be bradycardic to 40-50s.  Pt was transferred to [facility] for cardiac cath where he was found to have RCA and LAD occlusion.  Pt s/p 2 TU to RCA.

## 2020-08-21 NOTE — PROGRESS NOTE ADULT - SUBJECTIVE AND OBJECTIVE BOX
Cardiology Nurse Practitioner Progress note:   CODE STEMI-s/p [facility] with successful PCI and TU x2  to RCA & LAD via RFA by Dr Oswald Flores  observed overnight on ICU-no overnight events  Right venous and arterial sheaths pulled this am-manual pressure held till hemostasis achieved x 20 min-D-Stat drsg applied-groin site soft, NT, without any bleeding or hematoma formation-pt tolerated well  Patient feels well.  Denies chest pain, shortness of breath, dizziness or palpitations at this time.        EKG: Sinus Elio 58  TELE:no ectopy, NSR-Sinus Elio 58-60  GENERAL: appears comfortable-no c/o pain  CV: RRR, S1 S2, no murmur, rub, clicks or gallop noted  RESP: LCTA bilaterally, no wheeze, no rhonchi or crackles noted  GI/: soft, NT/ND  NEURO: AOx4, no focal deficits  EXTREMITIES: no edema, clubbing or cyanosis noted  PROCEDURE SITE:  Right groin venous and arterial sheaths removed, site CDI with no bleeding, no hematoma, area soft, non tender, positive pedal pulses bilaterally-D-stat drsg applied      T(C): 36.9 (08-21-20 @ 07:47), Max: 36.9 (08-21-20 @ 01:10)  HR: 60 (08-21-20 @ 08:15) (53 - 89)  BP: 156/86 (08-21-20 @ 08:15) (144/69 - 177/86)  RR: 30 (08-21-20 @ 08:15) (18 - 30)  SpO2: 96% (08-21-20 @ 08:15) (94% - 98%)  Wt(kg): --  CBC Full  -  ( 21 Aug 2020 03:51 )  WBC Count : 9.38 K/uL  RBC Count : 4.79 M/uL  Hemoglobin : 14.8 g/dL  Hematocrit : 42.6 %  CPlatelet Count - Automated : 188 K/uL  Mean Cell Volume : 88.9 fl  Mean Cell Hemoglobin : 30.9 pg  Mean Cell Hemoglobin Concentration : 34.7 gm/dL  Auto Neutrophil # : x  Auto Lymphocyte # : x  Auto Monocyte # : x  Auto Eosinophil # : x  Auto Basophil # : x  Auto Neutrophil % : x  Auto Lymphocyte % : x  Auto Monocyte % : x  Auto Eosinophil % : x  Auto Basophil % : x    08-21    140  |  103  |  13.0  ----------------------------<  119<H>  4.1   |  23.0  |  0.88    Ca    9.1      21 Aug 2020 03:51  Phos  2.4     08-21  Mg     1.9     08-21    TPro  6.6  /  Alb  4.2  /  TBili  0.4  /  DBili  x   /  AST  34  /  ALT  35  /  AlkPhos  67  08-21    CARDIAC MARKERS ( 21 Aug 2020 03:51 )  x     / 0.06 ng/mL / x     / x     / x              MEDICATIONS  (STANDING):  aspirin  chewable 81 milliGRAM(s) Oral daily  atorvastatin 80 milliGRAM(s) Oral at bedtime  chlorhexidine 2% Cloths 1 Application(s) Topical <User Schedule>  enoxaparin Injectable 40 milliGRAM(s) SubCutaneous daily  ticagrelor 90 milliGRAM(s) Oral every 12 hours    MEDICATIONS  (PRN):  acetaminophen   Tablet .. 650 milliGRAM(s) Oral every 6 hours PRN Moderate Pain (4 - 6)  hydrALAZINE Injectable 10 milliGRAM(s) IV Push every 4 hours PRN sbp > 170  ondansetron Injectable 4 milliGRAM(s) IV Push every 6 hours PRN Nausea and/or Vomiting        HPI:  51 y/o M with a h/o gout, on no medications, transferred from [facility] with STEMI. Noted to have inferior ST-elevations on EKG in the setting of anterior wall non-radiating chest pressure, SOB, and nausea. Bradycardic (HR 40s/50s). He underwent emergent cardiac catheterization here at [facility] which revealed significant occlusions of RCA and LAD. 2 TU were placed to the RCA. EF appeared normal on LV-gram. Post-procedure he continues to endorse chest pressure but much improved. He is sinus bradycardic with HR in the 40s/50s. Mildly hypertensive. (21 Aug 2020 01:36)    CODE STEMI-s/p [facility] with successful PCI and TU x2  to RCA & LAD via RFA by Dr Oswald Flores    ASSESSMENT/PLAN:    Coronary artery disease s/p STEMI  -HOB @ 30 degrees , HOB 45 degrees @ 12 noon  -Bedrest x 6 hours  post sheath pulls until 3 PM today  -Staged PCI Monday  -Continue DAPT-importance of continuing reinforced with pt  -Continue  Statin, Baby ASA/ Brilinta  -Plan of care discussed with patient,   -labs, EKG and procedure site assessed  -Follow-up with Cardiology  MD (Dr Flores  within 1 week  -Discussed therapeutic lifestyle changes to reduce risk factors such as following a cardiac diet, weight loss, maintaining a healthy weight, exercise, smoking cessation, medication compliance, and regular follow-up  with MD to know your numbers (BP, cholesterol, weight, and glucose)

## 2020-08-21 NOTE — PROGRESS NOTE ADULT - SUBJECTIVE AND OBJECTIVE BOX
Post intervention   LAD X 2 TU   right radial site benign  band in place     Plan   brilinta asa  BB ACE STATIN   cardiac rehab info provided/referral and communication to cardiac rehab completed  possible dc tomorrow   remove radial band at 9 pm  post pci ekg  am labs and EKG

## 2020-08-21 NOTE — PROGRESS NOTE ADULT - SUBJECTIVE AND OBJECTIVE BOX
[facility], [facility]                                          [address] , [address], [place] [address]                                               PHONE: [phone]    FAX: [phone] [address], [address], [place] [address]                                       PHONE: [phone]    FAX: [phone]  *******************************************************************************    Overnight events/Subjective Assessment: s/p emergent cardiac cath last night for IWSTEMI.  TU to RCA.  Pt continues to have mild 2 of 10 CP similar to presentation.  No SOB/palpitations    INTERPRETATION OF TELEMETRY (personally reviewed): SR/SB high 40s-70s, no events    No Known Allergies    MEDICATIONS  (STANDING):  aspirin  chewable 81 milliGRAM(s) Oral daily  atorvastatin 80 milliGRAM(s) Oral at bedtime  chlorhexidine 2% Cloths 1 Application(s) Topical <User Schedule>  enoxaparin Injectable 40 milliGRAM(s) SubCutaneous daily  ticagrelor 90 milliGRAM(s) Oral every 12 hours    MEDICATIONS  (PRN):  acetaminophen   Tablet .. 650 milliGRAM(s) Oral every 6 hours PRN Moderate Pain (4 - 6)  hydrALAZINE Injectable 10 milliGRAM(s) IV Push every 4 hours PRN sbp > 170  ondansetron Injectable 4 milliGRAM(s) IV Push every 6 hours PRN Nausea and/or Vomiting      Vital Signs Last 24 Hrs  T(C): 36.9 (21 Aug 2020 07:47), Max: 36.9 (21 Aug 2020 01:10)  T(F): 98.5 (21 Aug 2020 07:47), Max: 98.5 (21 Aug 2020 01:10)  HR: 54 (21 Aug 2020 09:45) (53 - 89)  BP: 156/81 (21 Aug 2020 09:45) (138/73 - 177/86)  BP(mean): 112 (21 Aug 2020 09:45) (99 - 124)  RR: 17 (21 Aug 2020 09:45) (17 - 30)  SpO2: 95% (21 Aug 2020 09:45) (94% - 98%)    I&O's Detail    20 Aug 2020 07:01  -  21 Aug 2020 07:00  --------------------------------------------------------  IN:  Total IN: 0 mL    OUT:    Voided: 600 mL  Total OUT: 600 mL    Total NET: -600 mL        I&O's Summary    20 Aug 2020 07:01  -  21 Aug 2020 07:00  --------------------------------------------------------  IN: 0 mL / OUT: 600 mL / NET: -600 mL            PHYSICAL EXAM:  General: Appears well developed, well nourished, no acute distress  HEENT: Head: normocephalic, atraumatic  Eyes: Pupils equal and reactive  Neck: Supple, no carotid bruit, no JVD, no HJR  CARDIOVASCULAR: Normal S1 and S2, no murmur, rub, or gallop  LUNGS: Clear to auscultation bilaterally, no rales, rhonchi or wheeze  ABDOMEN: Soft, nontender, non-distended, positive bowel sounds, no mass or bruit  EXTREMITIES: No edema, distal pulses WNL, sheaths just freshly removed from right groin, dressing C/D/I  SKIN: Warm and dry with normal turgor  NEURO: Alert & oriented x 3, grossly intact  PSYCH: normal mood and affect        LABS:                        14.8   9.38  )-----------( 188      ( 21 Aug 2020 03:51 )             42.6     08-21    140  |  103  |  13.0  ----------------------------<  119<H>  4.1   |  23.0  |  0.88    Ca    9.1      21 Aug 2020 03:51  Phos  2.4     08-21  Mg     1.9     08-21    TPro  6.6  /  Alb  4.2  /  TBili  0.4  /  DBili  x   /  AST  34  /  ALT  35  /  AlkPhos  67  08-21    CARDIAC MARKERS ( 21 Aug 2020 03:51 )  x     / 0.06 ng/mL / x     / x     / x          PT/INR - ( 21 Aug 2020 03:51 )   PT: 13.2 sec;   INR: 1.15 ratio         PTT - ( 21 Aug 2020 03:51 )  PTT:>200.0 sec  serum  Lipids:             ASSESSMENT AND PLAN:  In summary, CHASE GALVIN is a 50y Male with gout presents from [facility] for emergent cardiac cath in setting of IWSTEMI.  Pt received TU x 2 to the RCA.  Residual LAD and distal LCx disease remain.    - Appreciate ICU care  - ASA 81mg daily and Brilinta 90mg BID in place  - BP will allow for initiation of ACE this AM, however resting bradycardia precludes addition of BB at this time  - Pt continues to express mild chest discomfort similar to his presentation.  For this reason I would like to take him back to the cath lab for repeat angiography and likely revascularization of the LAD and LCx territories.  However, as his femoral arterial and venous sheaths were just removed, will have to wait as full dose AC will be needed for intervention. Plan for return to cath lab this afternoon. The risks/benefits/alternatives have been discussed with the patient and he agrees to proceed.   - Repeat EKG this AM  - Check echocardiogram  - AM labs are still pending.  - Trend CE until peak  - Check TSH, A1c, Lipids  - above plan d/w pt, intensivist, and RN    Brandyn Ngo MD

## 2020-08-21 NOTE — PROVIDER CONTACT NOTE (EICU) - RECOMMENDATIONS
49 yo M a/w STEMI transferred for Cath found  to have RCA and LAD occlusion s/p 2DES to RCA with plan for staged PCI on Monday to LAD.  - Start brillinta, cont aspirin  - Hold betablocker given bradycardia  - Start ACE-I and Statin (high dose)  - Follow up lipid panel and A1c  - TTE in AM.  - Telemetry monitoring  - Admit to ICU

## 2020-08-21 NOTE — PROVIDER CONTACT NOTE (EICU) - ASSESSMENT
Tele-evaluation precludes physical exam.  Pertinent physical exam findings as per verbal communication by bedside provider are as following.  GEN - NAD; well appearing; A+O x3; non-toxic appearing, reports no pain at this time; CARD -s1s2, bradycardic, no M,G,R; PULM - CTA b/l, symmetric breath sounds; ABD -  +BS, ND, NT, soft, no guarding, no rebound, no masses; BACK - no CVA tenderness, Normal  spine; EXT - symmetric pulses, 2+ dp, capillary refill < 2 seconds, no cyanosis, no edema; NEURO - no focal neuro deficits, no slurred speech

## 2020-08-22 ENCOUNTER — TRANSCRIPTION ENCOUNTER (OUTPATIENT)
Age: 50
End: 2020-08-22

## 2020-08-22 LAB
ALBUMIN SERPL ELPH-MCNC: 3.9 G/DL — SIGNIFICANT CHANGE UP (ref 3.3–5.2)
ALP SERPL-CCNC: 62 U/L — SIGNIFICANT CHANGE UP (ref 40–120)
ALT FLD-CCNC: 61 U/L — HIGH
ANION GAP SERPL CALC-SCNC: 13 MMOL/L — SIGNIFICANT CHANGE UP (ref 5–17)
AST SERPL-CCNC: 171 U/L — HIGH
BASOPHILS # BLD AUTO: 0.03 K/UL — SIGNIFICANT CHANGE UP (ref 0–0.2)
BASOPHILS NFR BLD AUTO: 0.3 % — SIGNIFICANT CHANGE UP (ref 0–2)
BILIRUB SERPL-MCNC: 0.9 MG/DL — SIGNIFICANT CHANGE UP (ref 0.4–2)
BUN SERPL-MCNC: 10 MG/DL — SIGNIFICANT CHANGE UP (ref 8–20)
CALCIUM SERPL-MCNC: 8.6 MG/DL — SIGNIFICANT CHANGE UP (ref 8.6–10.2)
CHLORIDE SERPL-SCNC: 102 MMOL/L — SIGNIFICANT CHANGE UP (ref 98–107)
CO2 SERPL-SCNC: 25 MMOL/L — SIGNIFICANT CHANGE UP (ref 22–29)
CREAT SERPL-MCNC: 0.83 MG/DL — SIGNIFICANT CHANGE UP (ref 0.5–1.3)
EOSINOPHIL # BLD AUTO: 0.02 K/UL — SIGNIFICANT CHANGE UP (ref 0–0.5)
EOSINOPHIL NFR BLD AUTO: 0.2 % — SIGNIFICANT CHANGE UP (ref 0–6)
GLUCOSE SERPL-MCNC: 114 MG/DL — HIGH (ref 70–99)
HCT VFR BLD CALC: 41.6 % — SIGNIFICANT CHANGE UP (ref 39–50)
HGB BLD-MCNC: 14.5 G/DL — SIGNIFICANT CHANGE UP (ref 13–17)
IMM GRANULOCYTES NFR BLD AUTO: 0.3 % — SIGNIFICANT CHANGE UP (ref 0–1.5)
LYMPHOCYTES # BLD AUTO: 1.51 K/UL — SIGNIFICANT CHANGE UP (ref 1–3.3)
LYMPHOCYTES # BLD AUTO: 15.1 % — SIGNIFICANT CHANGE UP (ref 13–44)
MAGNESIUM SERPL-MCNC: 1.9 MG/DL — SIGNIFICANT CHANGE UP (ref 1.6–2.6)
MCHC RBC-ENTMCNC: 31 PG — SIGNIFICANT CHANGE UP (ref 27–34)
MCHC RBC-ENTMCNC: 34.9 GM/DL — SIGNIFICANT CHANGE UP (ref 32–36)
MCV RBC AUTO: 89.1 FL — SIGNIFICANT CHANGE UP (ref 80–100)
MONOCYTES # BLD AUTO: 1.37 K/UL — HIGH (ref 0–0.9)
MONOCYTES NFR BLD AUTO: 13.7 % — SIGNIFICANT CHANGE UP (ref 2–14)
NEUTROPHILS # BLD AUTO: 7.02 K/UL — SIGNIFICANT CHANGE UP (ref 1.8–7.4)
NEUTROPHILS NFR BLD AUTO: 70.4 % — SIGNIFICANT CHANGE UP (ref 43–77)
PHOSPHATE SERPL-MCNC: 3.2 MG/DL — SIGNIFICANT CHANGE UP (ref 2.4–4.7)
PLATELET # BLD AUTO: 166 K/UL — SIGNIFICANT CHANGE UP (ref 150–400)
POTASSIUM SERPL-MCNC: 3.8 MMOL/L — SIGNIFICANT CHANGE UP (ref 3.5–5.3)
POTASSIUM SERPL-SCNC: 3.8 MMOL/L — SIGNIFICANT CHANGE UP (ref 3.5–5.3)
PROT SERPL-MCNC: 6.7 G/DL — SIGNIFICANT CHANGE UP (ref 6.6–8.7)
RBC # BLD: 4.67 M/UL — SIGNIFICANT CHANGE UP (ref 4.2–5.8)
RBC # FLD: 12.4 % — SIGNIFICANT CHANGE UP (ref 10.3–14.5)
SARS-COV-2 RNA SPEC QL NAA+PROBE: SIGNIFICANT CHANGE UP
SODIUM SERPL-SCNC: 139 MMOL/L — SIGNIFICANT CHANGE UP (ref 135–145)
WBC # BLD: 9.98 K/UL — SIGNIFICANT CHANGE UP (ref 3.8–10.5)
WBC # FLD AUTO: 9.98 K/UL — SIGNIFICANT CHANGE UP (ref 3.8–10.5)

## 2020-08-22 PROCEDURE — 99223 1ST HOSP IP/OBS HIGH 75: CPT

## 2020-08-22 PROCEDURE — 93010 ELECTROCARDIOGRAM REPORT: CPT

## 2020-08-22 RX ORDER — TICAGRELOR 90 MG/1
1 TABLET ORAL
Qty: 180 | Refills: 3
Start: 2020-08-22 | End: 2021-08-16

## 2020-08-22 RX ORDER — ATORVASTATIN CALCIUM 80 MG/1
1 TABLET, FILM COATED ORAL
Qty: 90 | Refills: 3
Start: 2020-08-22 | End: 2021-08-16

## 2020-08-22 RX ORDER — MAGNESIUM SULFATE 500 MG/ML
1 VIAL (ML) INJECTION ONCE
Refills: 0 | Status: COMPLETED | OUTPATIENT
Start: 2020-08-22 | End: 2020-08-22

## 2020-08-22 RX ORDER — LISINOPRIL 2.5 MG/1
1 TABLET ORAL
Qty: 30 | Refills: 3
Start: 2020-08-22 | End: 2020-12-19

## 2020-08-22 RX ORDER — ASPIRIN/CALCIUM CARB/MAGNESIUM 324 MG
1 TABLET ORAL
Qty: 90 | Refills: 3
Start: 2020-08-22 | End: 2021-08-16

## 2020-08-22 RX ADMIN — CHLORHEXIDINE GLUCONATE 1 APPLICATION(S): 213 SOLUTION TOPICAL at 04:00

## 2020-08-22 RX ADMIN — LISINOPRIL 10 MILLIGRAM(S): 2.5 TABLET ORAL at 05:31

## 2020-08-22 RX ADMIN — Medication 81 MILLIGRAM(S): at 10:37

## 2020-08-22 RX ADMIN — ATORVASTATIN CALCIUM 80 MILLIGRAM(S): 80 TABLET, FILM COATED ORAL at 21:59

## 2020-08-22 RX ADMIN — Medication 650 MILLIGRAM(S): at 21:58

## 2020-08-22 RX ADMIN — TICAGRELOR 90 MILLIGRAM(S): 90 TABLET ORAL at 17:00

## 2020-08-22 RX ADMIN — TICAGRELOR 90 MILLIGRAM(S): 90 TABLET ORAL at 05:31

## 2020-08-22 RX ADMIN — Medication 650 MILLIGRAM(S): at 05:34

## 2020-08-22 RX ADMIN — Medication 650 MILLIGRAM(S): at 06:05

## 2020-08-22 RX ADMIN — Medication 100 GRAM(S): at 10:36

## 2020-08-22 NOTE — DISCHARGE NOTE PROVIDER - NSDCMRMEDTOKEN_GEN_ALL_CORE_FT
ticagrelor 90 mg oral tablet: 1 tab(s) orally every 12 hours aspirin 81 mg oral tablet, chewable: 1 tab(s) orally once a day  atorvastatin 80 mg oral tablet: 1 tab(s) orally once a day (at bedtime)  lisinopril 10 mg oral tablet: 1 tab(s) orally once a day  ticagrelor 90 mg oral tablet: 1 tab(s) orally every 12 hours

## 2020-08-22 NOTE — PROGRESS NOTE ADULT - SUBJECTIVE AND OBJECTIVE BOX
[facility], [facility]                                                    [address], [address], [place] [address]                                                         PHONE: [phone]    FAX: [phone] [address], [address], [place] [address]                                                 PHONE: [phone]    FAX: [phone]  *******************************************************************************    Overnight events/Subjective Assessment:  No new cardiovascular issues overnight.  No recurrent chest pain.  No SOB or palpitations.  + R shoulder soreness.    No Known Allergies    MEDICATIONS  (STANDING):  aspirin  chewable 81 milliGRAM(s) Oral daily  atorvastatin 80 milliGRAM(s) Oral at bedtime  chlorhexidine 2% Cloths 1 Application(s) Topical <User Schedule>  lisinopril 10 milliGRAM(s) Oral daily  ticagrelor 90 milliGRAM(s) Oral every 12 hours    MEDICATIONS  (PRN):  acetaminophen   Tablet .. 650 milliGRAM(s) Oral every 6 hours PRN Moderate Pain (4 - 6)  hydrALAZINE Injectable 10 milliGRAM(s) IV Push every 4 hours PRN sbp > 170  ondansetron Injectable 4 milliGRAM(s) IV Push every 6 hours PRN Nausea and/or Vomiting      Vital Signs Last 24 Hrs  T(C): 36.9 (22 Aug 2020 11:32), Max: 37.7 (21 Aug 2020 23:50)  T(F): 98.5 (22 Aug 2020 11:32), Max: 99.8 (21 Aug 2020 23:50)  HR: 69 (22 Aug 2020 12:00) (48 - 76)  BP: 143/82 (22 Aug 2020 12:00) (125/65 - 167/86)  BP(mean): 106 (22 Aug 2020 12:00) (88 - 119)  RR: 23 (22 Aug 2020 12:00) (11 - 32)  SpO2: 93% (22 Aug 2020 12:00) (92% - 98%)    I&O's Detail    21 Aug 2020 07:01  -  22 Aug 2020 07:00  --------------------------------------------------------  IN:  Total IN: 0 mL    OUT:    Voided: 1400 mL  Total OUT: 1400 mL    Total NET: -1400 mL        I&O's Summary    21 Aug 2020 07:01  -  22 Aug 2020 07:00  --------------------------------------------------------  IN: 0 mL / OUT: 1400 mL / NET: -1400 mL            PHYSICAL EXAM:  General: Appears well developed, well nourished, no acute distress  HEENT: Head: normocephalic, atraumatic  Eyes: Pupils equal and reactive  Neck: Supple, no carotid bruit, no JVD, no HJR  CARDIOVASCULAR: Normal S1 and S2, no murmur, rub, or gallop  LUNGS: Clear to auscultation bilaterally, no rales, rhonchi or wheeze  ABDOMEN: Soft, nontender, non-distended, positive bowel sounds, no mass or bruit  EXTREMITIES: No edema, distal pulses WNL  SKIN: Warm and dry with normal turgor  NEURO: Alert & oriented x 3, grossly intact  PSYCH: normal mood and affect        LABS:                        14.5   9.98  )-----------( 166      ( 22 Aug 2020 04:49 )             41.6     08-22    139  |  102  |  10.0  ----------------------------<  114<H>  3.8   |  25.0  |  0.83    Ca    8.6      22 Aug 2020 04:49  Phos  3.2     08-22  Mg     1.9     08-22    TPro  6.7  /  Alb  3.9  /  TBili  0.9  /  DBili  x   /  AST  171<H>  /  ALT  61<H>  /  AlkPhos  62  08-22    CARDIAC MARKERS ( 21 Aug 2020 03:51 )  x     / 0.06 ng/mL / x     / x     / x          PT/INR - ( 21 Aug 2020 03:51 )   PT: 13.2 sec;   INR: 1.15 ratio         PTT - ( 21 Aug 2020 03:51 )  PTT:>200.0 sec  serum  Lipids:         RADIOLOGY & ADDITIONAL STUDIES:    TELEMETRY PERSONALLY REVIEWED: sinus 50-70s, no events    ECHO:        ASSESSMENT AND PLAN:  In summary, CHASE GALVIN is a 50M a/w inferior wall STEMI s/p PCI/TU m&dRCA x2 8/20/20, recurrent chest pain s/p PCI/TU pLAD, mLAD & dLCx 8/21/20, h/o HTN, HL, obesity  - Patient clinically stable s/p repeat PCI/stents.  Continue medical management of known severe CAD.  - Echocardiogram pending  - Rhythm stable NSR with intermittent sinus bradycardia, BP stable.  Continue current dose of Lisinopril 10 daily for now and titrate PRN (can increase to 20 daily if BP remains in current range).  B-blocker deferred for now given intermittent sinus bradycardia.  - ASA 81 & Brilinta 90 bid in place  - Lipitor 80 daily in place (lipids 8/21/20: , , HDL 49, Trig 38)  - HbA1c 5.2 on 8/21/20  - OOB/ambulate  - Plan for likely d/c home tomorrow AM if clinically stable with outpatient cardiac follow-up at [facility] in 1 week    Alec Miles MD

## 2020-08-22 NOTE — DISCHARGE NOTE PROVIDER - HOSPITAL COURSE
49 y/o M with a h/o gout, on no medications, transferred from [facility] with STEMI. Noted to have inferior ST-elevations on EKG in the setting of anterior wall non-radiating chest pressure, SOB, and nausea. Bradycardic (HR 40s/50s). He underwent emergent cardiac catheterization here at [facility] which revealed significant occlusions of RCA and LAD. 2 TU were placed to the RCA and 2 TU to Circumflex. EF appeared normal on LV-gram. Patient feels well.  Denies chest pain, shortness of breath, dizziness or palpitations at this time.                Procedures:          8/20  CODE STEMI-s/p [facility] with successful PCI and TU x2  to RCA & LAD via RFA by Dr Oswald Flores    Severe two vessel CAD with preserved LV function    -Successful drug-eluting stent therapy of an 80% distal RCA stenosis    -Successful drug-eluting stent therapy of an 80% mid RCA stenosis    RFA stable; no hematoma or ecchymosis post procedure day 2         8/21  [address] [place] [address] with  Dr. Ngo w/PCI:    - Successful PCI of the distal LCx with a 3.0mm Resolute TU (post-dilated with a 4.0mm balloon proximally)    - Successful PCI of the proximal LAD with a 4.0mm Resolute TU    - Successful PCI of the mid LAD with a 3.5mm Resoluted TU (post-dilated to 4.0mm)    RRA with out hematoma or ecchymosis; + palpable pulses 51 y/o M with a h/o gout, on no medications, transferred from [facility] with STEMI. Noted to have inferior ST-elevations on EKG in the setting of anterior wall non-radiating chest pressure, SOB, and nausea. Bradycardic (HR 40s/50s). He underwent emergent cardiac catheterization here at [facility] which revealed significant occlusions of RCA and LAD. 2 TU were placed to the RCA and 2 TU to Circumflex. EF appeared normal on LV-gram. Patient feels well.  Denies chest pain, shortness of breath, dizziness or palpitations at this time.                Procedures:          8/20  CODE STEMI-s/p [facility] with successful PCI and TU x2  to RCA & LAD via RFA by Dr Oswald Flores    Severe two vessel CAD with preserved LV function    -Successful drug-eluting stent therapy of an 80% distal RCA stenosis    -Successful drug-eluting stent therapy of an 80% mid RCA stenosis    RFA stable; no hematoma or ecchymosis post procedure day 2         8/21  [address] [place] [address] with  Dr. Ngo w/PCI:    - Successful PCI of the distal LCx with a 3.0mm Resolute TU (post-dilated with a 4.0mm balloon proximally)    - Successful PCI of the proximal LAD with a 4.0mm Resolute TU    - Successful PCI of the mid LAD with a 3.5mm Resoluted TU (post-dilated to 4.0mm)    RRA with out hematoma or ecchymosis; + palpable pulses         Medically stable and agreeable with discharge and follow up plan. Patient was advised to return to ED if any symptoms occur or worsen.        TIME 45 mins

## 2020-08-22 NOTE — DISCHARGE NOTE PROVIDER - NSDCACTIVITY_GEN_ALL_CORE
Do not drive or operate machinery/Showering allowed/Stairs allowed/Walking - Indoors allowed/Walking - Outdoors allowed

## 2020-08-22 NOTE — PROGRESS NOTE ADULT - SUBJECTIVE AND OBJECTIVE BOX
Progress Note Cardiac Cath NP      Narrative:    51 y/o M with a h/o gout, on no medications, transferred from [facility] with STEMI. Noted to have inferior ST-elevations on EKG in the setting of anterior wall non-radiating chest pressure, SOB, and nausea. Bradycardic (HR 40s/50s). He underwent emergent cardiac catheterization here at [facility] which revealed significant occlusions of RCA and LAD. 2 TU were placed to the RCA and 2 TU to Circumflex. EF appeared normal on LV-gram. Patient feels well.  Denies chest pain, shortness of breath, dizziness or palpitations at this time.         Procedures:        CODE STEMI-s/p [facility] with successful PCI and TU x2  to RCA & LAD via RFA by Dr Oswald Flores  Right venous and arterial sheaths pulled this am-manual pressure held till hemostasis achieved x 20 min-D-Stat drsg applied-groin site soft, NT, without any bleeding or hematoma formation-pt tolerated well      [address] [place] [address] with  Dr. Ngo w/PCI to     PAST MEDICAL & SURGICAL HISTORY:  Gout    FAMILY HISTORY:    Home Medications:    Patient is a 50y old  Male who presents with a chief complaint of STEMI (22 Aug 2020 08:21)    HEALTH ISSUES - PROBLEM Dx:        HPI:  51 y/o M with a h/o gout, on no medications, transferred from [facility] with STEMI. Noted to have inferior ST-elevations on EKG in the setting of anterior wall non-radiating chest pressure, SOB, and nausea. Bradycardic (HR 40s/50s). He underwent emergent cardiac catheterization here at [facility] which revealed significant occlusions of RCA and LAD. 2 TU were placed to the RCA. EF appeared normal on LV-gram. Post-procedure he continues to endorse chest pressure but much improved. He is sinus bradycardic with HR in the 40s/50s. Mildly hypertensive. (21 Aug 2020 01:36)    General: No fatigue, no fevers/chills  Respiratory: No dyspnea, no cough, no wheeze  CV: No chest pain, no palpitations  Abd: No nausea  Neuro: No headache, no dizziness  No Known Allergies      Objective:  Vital Signs Last 24 Hrs  T(C): 36.9 (22 Aug 2020 11:32), Max: 37.7 (21 Aug 2020 23:50)  T(F): 98.5 (22 Aug 2020 11:32), Max: 99.8 (21 Aug 2020 23:50)  HR: 63 (22 Aug 2020 10:00) (48 - 76)  BP: 136/75 (22 Aug 2020 10:00) (125/65 - 167/86)  BP(mean): 100 (22 Aug 2020 10:00) (88 - 119)  RR: 27 (22 Aug 2020 10:00) (11 - 32)  SpO2: 96% (22 Aug 2020 10:00) (92% - 98%)    CM: SR  Neuro: A&OX3, CN 2-12 intact  HEENT: NC, AT  Lungs: CTA B/L  CV: S1, S2, no murmur, RRR  Abd: Soft  Right Groin: Soft, no bleeding, no hematoma  Extremity: + distal pulses  EK.5   9.98  )-----------( 166      ( 22 Aug 2020 04:49 )             41.6     08-22    139  |  102  |  10.0  ----------------------------<  114<H>  3.8   |  25.0  |  0.83    Ca    8.6      22 Aug 2020 04:49  Phos  3.2     08-22  Mg     1.9     08-22    TPro  6.7  /  Alb  3.9  /  TBili  0.9  /  DBili  x   /  AST  171<H>  /  ALT  61<H>  /  AlkPhos  62  08-22    PT/INR - ( 21 Aug 2020 03:51 )   PT: 13.2 sec;   INR: 1.15 ratio         PTT - ( 21 Aug 2020 03:51 )  PTT:>200.0 sec    -post cardiac cath orders  -radial or groin precautions  -continue current medical therapy  -DAPT (ASA and plavix)  -statin  -BB  -follow up outpt in 2 weeks Progress Note Cardiac Cath NP      Narrative:    51 y/o M with a h/o gout, on no medications, transferred from [facility] with STEMI. Noted to have inferior ST-elevations on EKG in the setting of anterior wall non-radiating chest pressure, SOB, and nausea. Bradycardic (HR 40s/50s). He underwent emergent cardiac catheterization here at [facility] which revealed significant occlusions of RCA and LAD. 2 TU were placed to the RCA and 2 TU to Circumflex. EF appeared normal on LV-gram. Patient feels well.  Denies chest pain, shortness of breath, dizziness or palpitations at this time.          Procedures:      8/20  CODE STEMI-s/p [facility] with successful PCI and TU x2  to RCA & LAD via RFA by Dr Oswald Flores  Severe two vessel CAD with preserved LV function  -Successful drug-eluting stent therapy of an 80% distal RCA stenosis  -Successful drug-eluting stent therapy of an 80% mid RCA stenosis  RFA stable; no hematoma or ecchymosis post procedure day 2     8/21  [address] [place] [address] with  Dr. Ngo w/PCI:  - Successful PCI of the distal LCx with a 3.0mm Resolute TU (post-dilated with a 4.0mm balloon proximally)  - Successful PCI of the proximal LAD with a 4.0mm Resolute TU  - Successful PCI of the mid LAD with a 3.5mm Resoluted TU (post-dilated to 4.0mm)  RRA with out hematoma or ecchymosis; + palpable pulses       PAST MEDICAL & SURGICAL HISTORY:  Gout    FAMILY HISTORY:    Home Medications:  None     acetaminophen   Tablet .. 650 milliGRAM(s) Oral every 6 hours PRN  aspirin  chewable 81 milliGRAM(s) Oral daily  atorvastatin 80 milliGRAM(s) Oral at bedtime  chlorhexidine 2% Cloths 1 Application(s) Topical <User Schedule>  hydrALAZINE Injectable 10 milliGRAM(s) IV Push every 4 hours PRN  lisinopril 10 milliGRAM(s) Oral daily  ondansetron Injectable 4 milliGRAM(s) IV Push every 6 hours PRN  ticagrelor 90 milliGRAM(s) Oral every 12 hours                        14.5   9.98  )-----------( 166      ( 22 Aug 2020 04:49 )             41.6       Hemoglobin: 14.5 g/dL (08-22 @ 04:49)  Hemoglobin: 14.8 g/dL (08-21 @ 03:51)      08-22    139  |  102  |  10.0  ----------------------------<  114<H>  3.8   |  25.0  |  0.83    Ca    8.6      22 Aug 2020 04:49  Phos  3.2     08-22  Mg     1.9     08-22    TPro  6.7  /  Alb  3.9  /  TBili  0.9  /  DBili  x   /  AST  171<H>  /  ALT  61<H>  /  AlkPhos  62  08-22    Creatinine Trend: 0.83<--, 0.88<--    COAGS:     CARDIAC MARKERS ( 21 Aug 2020 03:51 )  x     / 0.06 ng/mL / x     / x     / x          PHYSICAL EXAM:  Vital Signs last 24 Hours   T(C): 36.9 (08-22-20 @ 11:32), Max: 37.7 (08-21-20 @ 23:50)  HR: 63 (08-22-20 @ 10:00) (48 - 76)  BP: 136/75 (08-22-20 @ 10:00) (125/65 - 167/86)  RR: 27 (08-22-20 @ 10:00) (11 - 32)  SpO2: 96% (08-22-20 @ 10:00) (92% - 98%)  Wt(kg): --    I&O's Summary    21 Aug 2020 07:01  -  22 Aug 2020 07:00  --------------------------------------------------------  IN: 0 mL / OUT: 1400 mL / NET: -1400 mL       Gen: Appears well in NAD  HEENT:  (-)icterus (-)pallor  CV: N S1 S2, (+)2 Pulses B/l, No JVD   Resp:  Clear to auscultation B/L, normal effort  GI: (+) BS Soft, NT, ND  Lymph:  (-)Edema, (-)obvious lymphadenopathy  Skin: Warm to touch, Normal turgor  Right Wrist:  no evidence of hematoma, ecchymosis; ++ palpable pulses  Rigth Groin: stable, no bleeding no hematoma noted, site soft non tender, positive pedal pulses bilateral   Psych: Appropriate mood and affect      TELEMETRY:  NSR    DIAGNOSTICS:     < from: Cardiac Cath Lab - Adult (08.20.20 @ 23:48) >  VENTRICLES: There were no left ventricular global or regional wall motion  abnormalities. EF estimated was 50 %.  CORONARY VESSELS: The coronary circulation is right dominant.  LM:   --  LM: Angiography showed a cloacal LM with minor luminal  irregularities with no flow limiting lesions.  LAD:   --  Proximal LAD: There was a tubular 70 % stenosis. The lesion was  smoothly contoured and eccentric. There was BRIANNA grade 3 flow through the  vessel (brisk flow).  --  Mid LAD: There was a tubular 80 % stenosis. The lesion was complex and  eccentric. There was BRIANNA grade 3 flow through the vessel (brisk flow).  CX:   --  Circumflex: Angiography showed minor luminal irregularities with  no flow limiting lesions.  RCA:   --  Mid RCA: There was a tubular 80 % stenosis. The lesion was  complex and eccentric. There was BRIANNA grade 3 flow through the vessel  (brisk flow). This is a likely culprit for the patient's clinical  presentation.  --  Distal RCA: There was a diffuse 80 % stenosis. The lesion was hazy,  complex, and eccentric. There was BRIANNA grade 3 flow through the vessel  (brisk flow). This is a likely culprit for the patient's clinical  presentation.  COMPLICATIONS: No complications occurred during the cath lab visit.  SUMMARY:  Summary: Addendum 8/21/20: Case reconfirmed to update account number and  post results to HIS  DIAGNOSTIC IMPRESSIONS: Acute inferior STEMI (RCA)  Severe two vessel CAD with preserved LV function  Successful drug-eluting stent therapy of an 80% distal RCA stenosis  Successful drug-eluting stent therapy of an 80% mid RCA stenosis  There was BRIANNA III flow throughout the dominant RCA post-intervention  The patient was painfree post-intervention  DIAGNOSTIC RECOMMENDATIONS: To MICU  Bedrest overnight with the right leg straight  Aspirin 81 mg daily  Brilinta 90 mg BID  Lisinopril 5 mg daily  Atorvastatin 80 mg daily  Consider lowdose beta blocker therapy tomorrow  RFA/V sheaths out in AM  Serial CE, ECGs. Echo tomorrow  LAD PCI with me on Monday 8/24  [facility] will follow for Cardiology  Discussed with the patient, his wife, and the MICU team  INTERVENTIONAL IMPRESSIONS: Acute inferior STEMI (RCA)  Severe two vessel CAD with preserved LV function  Successful drug-eluting stent therapy of an 80% distal RCA stenosis  Successful drug-eluting stent therapy of an 80% mid RCA stenosis  There was BRIANNA III flow throughout the dominant RCA post-intervention  The patient was painfree post-intervention  INTERVENTIONAL RECOMMENDATIONS: To MICU  Bedrest overnight with the right leg straight  Aspirin 81 mg daily  Brilinta 90 mg BID  Lisinopril 5 mg daily  Atorvastatin 80 mg daily  Consider low dose beta blocker therapy tomorrow  RFA/V sheaths out in AM  Serial CE, ECGs. Echo tomorrow  LAD PCI with me on Monday 8/24  [facility] will follow for Cardiology  Discussed with the patient, his wife, and the MICU team  Prepared and signed by  Oswald Flores MD  Signed 08/21/2020 12:08:41    < end of copied text >      < from: Cardiac Cath Lab - Adult (08.21.20 @ 17:32) >  VENTRICLES: No left ventriculogram was performed. EF 50% at time of  intervention for STEMI.  CORONARY VESSELS: The coronary circulation is right dominant.  LM:   --  LM: The LM is cloacal. Angiography showed no evidence of disease.  LAD:   --  Proximal LAD: There was a tubular 70 % stenosis. The lesion was  irregularly contoured, eccentric, and mildly calcified. There was BRIANNA  grade 3 flow through the vessel (brisk flow). An intervention was  performed.  --  Mid LAD: There was a discrete 80 % stenosis. The lesion was irregularly  contoured, eccentric, and moderately calcified. There was BRIANNA grade 3  flow through the vessel (brisk flow). This vicki likely culprit for the  patient's clinical presentation. An intervention was performed.  --  D1: The vessel was small sized. There was a discrete 40 % stenosis. The  lesion was smoothly contoured and eccentric.  CX:   --  Mid circumflex: There was a discrete 30 % stenosis. The lesion  was irregularly contoured and eccentric.  --  Distal circumflex: There was a tubular 100 % stenosis at the origin of  OM2. There was BRIANNA grade 0 flow through the vessel (no flow). This is a  likely culprit for the patient's clinical presentation. An intervention  was performed.  RCA:   --  Mid RCA: There was a discrete 40 % stenosis. The lesion was  smoothly contoured, irregularly contoured, and eccentric. In a second  lesion, there was a 0 % stenosis at the site of a prior stent.  --  Distal RCA: There was a 0 % stenosis at the site of a prior stent.  COMPLICATIONS: No complications occurred during the cath lab visit.  DIAGNOSTIC IMPRESSIONS: - Patent RCA stents  - Successful PCI of the distal LCx with a3.0mm Resolute TU (post-dilated   with a 4.0mm balloon proximally)  - Successful PCI of the proximal LAD with a 4.0mm Resolute TU  - Successful PCI of the mid LAD with a 3.5mm Resoluted TU (post-dilated to   4.0mm)  - Complete stent expansion and apposition confirmed by IVUS imaging  DIAGNOSTIC RECOMMENDATIONS: - ASA 81mg daily indefinitely; Brilinta 90mg  BID for 1 year  - Aggressive medical management and risk factor modification  - Lipitor 80mg qHS  - ACEi, BB if HR will permit (resting sinus bradycardia)  - Outpatient follow up with Dr. Flores at the [facility] in 1   week  INTERVENTIONAL IMPRESSIONS: - Patent RCA stents  - Successful PCI of the distal LCx with a 3.0mm Resolute TU (post-dilated   with a 4.0mm balloon proximally)  - Successful PCI of the proximal LAD with a 4.0mm Resolute TU  - Successful PCI of the mid LAD with a 3.5mm Resoluted TU (post-dilated to   4.0mm)  - Complete stent expansion and apposition confirmed by IVUS imaging  INTERVENTIONAL RECOMMENDATIONS: - ASA 81mg daily indefinitely; Brilinta  90mg BID for 1 year  - Aggressive medical management and risk factor modification  - Lipitor 80mg qHS  - ACEi, BB if HR will permit (resting sinus bradycardia)  - Outpatient follow up with Dr. Flores at the [facility] in 1   week  Prepared and signed by  Brandyn Ngo MD  Signed 08/21/2020 19:35:29    < end of copied text >      ASSESSMENT AND PLAN:      51 y/o M with a h/o gout, on no medications, transferred from [facility] with STEMI. Noted to have inferior ST-elevations on EKG in the setting of anterior wall non-radiating chest pressure, SOB, and nausea. Bradycardic (HR 40s/50s). He underwent emergent cardiac catheterization here at [facility] which revealed significant occlusions of RCA and LAD. 2 TU were placed to the RCA and 2 TU to Circumflex. EF appeared normal on LV-gram. Patient feels well.  Denies chest pain, shortness of breath, dizziness or palpitations at this time.       - pt with no events on telemetry  - EKG with NSR and no acute ischemic changes   - denies cp, sob, palpitations  - RFA stable; no hematoma or ecchymosis, + palpable pulses    - RRA with out hematoma or ecchymosis; + palpable pulses   - pt to continue Aspirin 81 mg daily and Brilinta 90 mg twice a day  - continue current medical management of BP and cholesterol   - follow with Dr. Flores at [facility] in 1 week; please call and make appointment on Monday   - plan for discharge on Sunday 8/23 as discussed with Dr. Flores

## 2020-08-22 NOTE — DISCHARGE NOTE PROVIDER - NSDCCPCAREPLAN_GEN_ALL_CORE_FT
PRINCIPAL DISCHARGE DIAGNOSIS  Diagnosis: ST elevation MI (STEMI)  Assessment and Plan of Treatment: Procedures:      8/20  CODE STEMI-s/p [facility] with successful PCI and TU x2  to RCA & LAD via RFA by Dr Oswald Flores  Severe two vessel CAD with preserved LV function  -Successful drug-eluting stent therapy of an 80% distal RCA stenosis  -Successful drug-eluting stent therapy of an 80% mid RCA stenosis  8/21  [address] [place] [address] with  Dr. Ngo w/PCI:  - Successful PCI of the distal LCx with a 3.0mm Resolute TU (post-dilated with a 4.0mm balloon proximally)  - Successful PCI of the proximal LAD with a 4.0mm Resolute TU  - Successful PCI of the mid LAD with a 3.5mm Resoluted TU (post-dilated to 4.0mm)  Pt to follow [facility] Dr. Flores in 1 week - pt to call and make appointment PRINCIPAL DISCHARGE DIAGNOSIS  Diagnosis: ST elevation MI (STEMI)  Assessment and Plan of Treatment: Procedures:      8/20  CODE STEMI-s/p [facility] with successful PCI and TU x2  to RCA & LAD via RFA by Dr Oswald Flores  Severe two vessel CAD with preserved LV function  -Successful drug-eluting stent therapy of an 80% distal RCA stenosis  -Successful drug-eluting stent therapy of an 80% mid RCA stenosis  8/21  [address] [place] [address] with  Dr. Ngo w/PCI:  - Successful PCI of the distal LCx with a 3.0mm Resolute TU (post-dilated with a 4.0mm balloon proximally)  - Successful PCI of the proximal LAD with a 4.0mm Resolute TU  - Successful PCI of the mid LAD with a 3.5mm Resoluted TU (post-dilated to 4.0mm)  Pt to follow [facility] Dr. Flores in 1 week - pt to call and make appointment         SECONDARY DISCHARGE DIAGNOSES  Diagnosis: CAD in native artery  Assessment and Plan of Treatment:     Diagnosis: Bradycardia  Assessment and Plan of Treatment:     Diagnosis: HTN (hypertension)  Assessment and Plan of Treatment:

## 2020-08-22 NOTE — PROGRESS NOTE ADULT - ASSESSMENT
49 y/o male with pmhx of gout admitted with inferior wall STEMI now s/p two cardiac catheterizations with placement of 2 TU to RCA, 2 TU circumflex, 2 TU LAD.    continue aspirin, brilinta, high dose atorvastatin, lisinopril. Hold BB due to bradycardia. increase ACE as needed for bp control, though currently within goal. TTE pending, EF was normal during first cath. keep K > 4, Mg > 2.     stable for transfer to telemetry. 51 y/o male with pmhx of gout admitted with inferior wall STEMI now s/p two cardiac catheterizations with placement of 2 TU to RCA, 2 TU circumflex, 2 TU LAD.    continue aspirin, brilinta, high dose atorvastatin, lisinopril. Hold BB due to bradycardia. increase ACE as needed for bp control, though currently within goal. TTE pending, EF was normal during first cath. keep K > 4, Mg > 2. Dash diet, lifestyle modifications.    stable for transfer to telemetry.

## 2020-08-22 NOTE — CONSULT NOTE ADULT - SUBJECTIVE AND OBJECTIVE BOX
ccu downgrade acceptance note      HPI:  51 y/o M with a h/o gout, on no medications, transferred from [facility] with STEMI. Noted to have inferior ST-elevations on EKG in the setting of anterior wall non-radiating chest pressure, SOB, and nausea. Bradycardic (HR 40s/50s). He underwent emergent cardiac catheterization here at [facility] which revealed significant occlusions of RCA and LAD. 2 TU were placed to the RCA. EF appeared normal on LV-gram. Post-procedure he continues to endorse chest pressure but much improved. He is sinus bradycardic with HR in the 40s/50s. Mildly hypertensive. (21 Aug 2020 01:36)    COURSE:  transferred from [place] with inferior STEMI taken to cath lab where he had 2 TU placed to RCA. LAD with significant stenosis as well, plan originally was for staged PCI. While in ICU patients chest pain/pressure did not resolve and he was taken back to the cath lab where he had an additional 2 stents placed to the circumflex and 2 stents placed to the LAD. Also noted in CCU sinus bradycardia and AVN blockers put on hold      PAST MEDICAL & SURGICAL HISTORY:  Gout      MEDICATIONS  (STANDING):  aspirin  chewable 81 milliGRAM(s) Oral daily  atorvastatin 80 milliGRAM(s) Oral at bedtime  chlorhexidine 2% Cloths 1 Application(s) Topical <User Schedule>  lisinopril 10 milliGRAM(s) Oral daily  ticagrelor 90 milliGRAM(s) Oral every 12 hours    MEDICATIONS  (PRN):  acetaminophen   Tablet .. 650 milliGRAM(s) Oral every 6 hours PRN Moderate Pain (4 - 6)  hydrALAZINE Injectable 10 milliGRAM(s) IV Push every 4 hours PRN sbp > 170  ondansetron Injectable 4 milliGRAM(s) IV Push every 6 hours PRN Nausea and/or Vomiting      Allergies  No Known Allergies  SOCIAL HISTORY:  former chewing tobacco user, occasional EtOH, denies illicit drug use  FAMILY HISTORY:  he was adopted    Vital Signs Last 24 Hrs  T(C): 38 (22 Aug 2020 15:09), Max: 38 (22 Aug 2020 15:09)  T(F): 100.4 (22 Aug 2020 15:09), Max: 100.4 (22 Aug 2020 15:09)  HR: 71 (22 Aug 2020 14:00) (49 - 76)  BP: 143/72 (22 Aug 2020 14:00) (125/65 - 159/76)  BP(mean): 100 (22 Aug 2020 14:00) (88 - 110)  RR: 25 (22 Aug 2020 14:00) (19 - 27)  SpO2: 95% (22 Aug 2020 14:00) (92% - 98%)    SUBJECTIVE:  Pt comfortable  no c/o CP    REVIEW OF SYSTEMS:    as above    PHYSICAL EXAM:    General: Appears well developed, well nourished, no acute distress  HEENT: Head: normocephalic, atraumatic  Eyes: Pupils equal and reactive  Neck: Supple, no carotid bruit, no JVD, no HJR  CARDIOVASCULAR: Normal S1 and S2, no murmur, rub, or gallop  LUNGS: Clear to auscultation bilaterally, no rales, rhonchi or wheeze  ABDOMEN: Soft, nontender, non-distended, positive bowel sounds, no mass or bruit  EXTREMITIES: No edema, distal pulses WNL  SKIN: Warm and dry with normal turgor  NEURO: Alert & oriented x 3, grossly intact  PSYCH: normal mood and affect      LABS:                        14.5   9.98  )-----------( 166      ( 22 Aug 2020 04:49 )             41.6     08-22    139  |  102  |  10.0  ----------------------------<  114<H>  3.8   |  25.0  |  0.83    Ca    8.6      22 Aug 2020 04:49  Phos  3.2     08-22  Mg     1.9     08-22    TPro  6.7  /  Alb  3.9  /  TBili  0.9  /  DBili  x   /  AST  171<H>  /  ALT  61<H>  /  AlkPhos  62  08-22    PT/INR - ( 21 Aug 2020 03:51 )   PT: 13.2 sec;   INR: 1.15 ratio         PTT - ( 21 Aug 2020 03:51 )  PTT:>200.0 sec

## 2020-08-22 NOTE — DISCHARGE NOTE PROVIDER - NSDCFUADDINST_GEN_ALL_CORE_FT
No heavy lifting, driving, sex, tub baths, swimming, or any activity that submerges the lower half of the body in water for 48 hours.  Limited walking and stairs for 48 hours.    Change the bandaid after 24 hours and every 24 hours after that.  Keep the puncture site dry and covered with a bandaid until a scab forms.    Observe the site frequently.  If bleeding or a large lump (the size of a golf ball or bigger) occurs lie flat, apply continuous direct pressure just above the puncture site for at least 10 minutes, and notify your physician immediately.  If the bleeding cannot be controlled, call 911 immediately for assistance.  Notify your physician of pain, swelling or any drainage.    Notify your physician immediately if coldness, numbness, discoloration or pain in your foot occurs.    Restricted use with no heavy lifting of affected arm for 48 hours.  No submerging the arm in water for 48 hours.  You may start showering today.  Call your doctor for any bleeding, swelling, loss of sensation in the hand or fingers, or fingers turning blue.  If heavy bleeding or large lumps form, hold pressure at the spot and come to the Emergency Room.    cardiac rehab info provided/referral and communication to cardiac rehab completed

## 2020-08-22 NOTE — CONSULT NOTE ADULT - ASSESSMENT
50M a/w inferior wall STEMI s/p PCI/TU m&dRCA x2 8/20/20, recurrent chest pain s/p PCI/TU pLAD, mLAD & dLCx 8/21/20, h/o HTN, HL, obesity      # STEMI with Severe CAD   s/p PCI/TU m&dRCA x2 8/20/20, recurrent chest pain s/p PCI/TU pLAD, mLAD & dLCx 8/21/20,   ASA, brilinta, high dose statin, ACEi  post intervention ECHO- WNL    # Sinus bradycardia  likely sec to infr infarct  currently hold off on AVN blockers    # HTN  controlled  Cont rest of home meds    Plan: d/c home in AM

## 2020-08-22 NOTE — DISCHARGE NOTE PROVIDER - CARE PROVIDER_API CALL
Oswald Flores  CARDIOVASCULAR DISEASE  [address], [address]  [place] [address]  Phone: [phone]  Fax: [phone]  Follow Up Time: Oswald Flores  CARDIOVASCULAR DISEASE  [address], [address]  [place] [address]  Phone: [phone]  Fax: [phone]  Follow Up Time:     PMD,   Phone: (   )    -  Fax: (   )    -  Follow Up Time:

## 2020-08-22 NOTE — DISCHARGE NOTE PROVIDER - PROVIDER TOKENS
PROVIDER:[TOKEN:[[id number]]] PROVIDER:[TOKEN:[[id number]]],FREE:[LAST:[PMD],PHONE:[(   )    -],FAX:[(   )    -]]

## 2020-08-22 NOTE — DISCHARGE NOTE PROVIDER - NSDCPNSUBOBJ_GEN_ALL_CORE
HEALTH ISSUES - PROBLEM Dx:        STEMI        INTERVAL HPI/ OVERNIGHT EVENTS:        transferred from [place] with inferior STEMI taken to cath lab where he had 2 TU placed to RCA. LAD with significant stenosis as well, plan originally was for staged PCI. While in ICU patients chest pain/pressure did not resolve and he was taken back to the cath lab where he had an additional 2 stents placed to the circumflex and 2 stents placed to the LAD. Also noted in CCU sinus bradycardia and AVN blockers put on hold        SUBJECTIVE:    Pt comfortable    no c/o CP        REVIEW OF SYSTEMS:        as above        Vital Signs Last 24 Hrs    T(C): 37.2 (23 Aug 2020 09:37), Max: 38 (22 Aug 2020 15:09)    T(F): 99 (23 Aug 2020 09:37), Max: 100.4 (22 Aug 2020 15:09)    HR: 70 (23 Aug 2020 09:37) (56 - 74)    BP: 115/72 (23 Aug 2020 09:37) (115/72 - 156/85)    BP(mean): 92 (22 Aug 2020 18:00) (92 - 114)    RR: 20 (23 Aug 2020 09:37) (18 - 27)    SpO2: 100% (23 Aug 2020 09:37) (93% - 100%)        PHYSICAL EXAM-    General: Appears well developed, well nourished, no acute distress    HEENT: Head: normocephalic, atraumatic    Eyes: Pupils equal and reactive    Neck: Supple, no carotid bruit, no JVD, no HJR    CARDIOVASCULAR: Normal S1 and S2, no murmur, rub, or gallop    LUNGS: Clear to auscultation bilaterally, no rales, rhonchi or wheeze    ABDOMEN: Soft, nontender, non-distended, positive bowel sounds, no mass or bruit    EXTREMITIES: No edema, distal pulses WNL    SKIN: Warm and dry with normal turgor    NEURO: Alert & oriented x 3, grossly intact    PSYCH: normal mood and affect            MEDICATIONS  (STANDING):    aspirin  chewable 81 milliGRAM(s) Oral daily    atorvastatin 80 milliGRAM(s) Oral at bedtime    chlorhexidine 2% Cloths 1 Application(s) Topical <User Schedule>    lisinopril 10 milliGRAM(s) Oral daily    ticagrelor 90 milliGRAM(s) Oral every 12 hours        MEDICATIONS  (PRN):    acetaminophen   Tablet .. 650 milliGRAM(s) Oral every 6 hours PRN Moderate Pain (4 - 6)    hydrALAZINE Injectable 10 milliGRAM(s) IV Push every 4 hours PRN sbp > 170    ondansetron Injectable 4 milliGRAM(s) IV Push every 6 hours PRN Nausea and/or Vomiting            LABS:                            15.1     8.85  )-----------( 176      ( 23 Aug 2020 07:14 )               44.9         08-23        140  |  100  |  14.0    ----------------------------<  96    4.0   |  27.0  |  0.92        Ca    9.1      23 Aug 2020 07:14    Phos  3.2     08-22    Mg     1.9     08-22        TPro  6.7  /  Alb  3.9  /  TBili  0.9  /  DBili  x   /  AST  171<H>  /  ALT  61<H>  /  AlkPhos  62  08-22        Assessment and Recommendation:         50M a/w inferior wall STEMI s/p PCI/ TU m&d RCA x2 8/20/20, recurrent chest pain s/p PCI/ TU pLAD, mLAD & dLCx 8/21/20, h/o HTN, HL, obesity            # STEMI with Severe CAD     s/p PCI/ TU m&dRCA x2 8/20/20, recurrent chest pain s/p PCI/ TU pLAD, mLAD & dLCx 8/21/20,     ASA, brilinta, high dose statin, ACEi    post intervention ECHO- WNL        # Sinus bradycardia    likely sec to infr infarct    currently hold off on AVN blockers        # HTN    controlled    Cont rest of home meds        Plan: d/c home

## 2020-08-22 NOTE — PROGRESS NOTE ADULT - SUBJECTIVE AND OBJECTIVE BOX
Patient is a 50y old  Male who presents with a chief complaint of STEMI (21 Aug 2020 20:01)      BRIEF HOSPITAL COURSE: 51 y/o male with pmhx of gout who was transferred from [place] with inferior STEMI taken to cath lab where he had 2 TU placed to RCA. LAD with significant stenosis as well, plan originally was for staged PCI. While in ICU patients chest pain/pressure did not resolve and he was taken back to the cath lab where he had an additional 2 stents placed to the circumflex and 2 stents placed to the LAD. Hospital course complicated by sinus bradycadia, hemodynamically stable. First cath access gained via right femoral, second via right radial.    Events last 24 hours: chest pain/pressure improved. HR in low 60s. BP stable.     PAST MEDICAL & SURGICAL HISTORY:  Gout      Review of Systems:  CONSTITUTIONAL: No fever, chills, or fatigue  EYES: No eye pain, visual disturbances, or discharge  ENMT:  No difficulty hearing, tinnitus, vertigo; No sinus or throat pain  NECK: No pain or stiffness  RESPIRATORY: No cough, wheezing, chills or hemoptysis; No shortness of breath  CARDIOVASCULAR: No chest pain, palpitations, dizziness, or leg swelling  GASTROINTESTINAL: No abdominal or epigastric pain. No nausea, vomiting, or hematemesis; No diarrhea or constipation. No melena or hematochezia.  GENITOURINARY: No dysuria, frequency, hematuria, or incontinence  NEUROLOGICAL: No headaches, memory loss, loss of strength, numbness, or tremors  SKIN: No itching, burning, rashes, or lesions   MUSCULOSKELETAL: No joint pain or swelling; No muscle, back, or extremity pain  PSYCHIATRIC: No depression, anxiety, mood swings, or difficulty sleeping      Medications:    hydrALAZINE Injectable 10 milliGRAM(s) IV Push every 4 hours PRN  lisinopril 10 milliGRAM(s) Oral daily      acetaminophen   Tablet .. 650 milliGRAM(s) Oral every 6 hours PRN  ondansetron Injectable 4 milliGRAM(s) IV Push every 6 hours PRN      aspirin  chewable 81 milliGRAM(s) Oral daily  ticagrelor 90 milliGRAM(s) Oral every 12 hours        atorvastatin 80 milliGRAM(s) Oral at bedtime        chlorhexidine 2% Cloths 1 Application(s) Topical <User Schedule>            ICU Vital Signs Last 24 Hrs  T(C): 37.7 (22 Aug 2020 07:36), Max: 37.7 (21 Aug 2020 23:50)  T(F): 99.8 (22 Aug 2020 07:36), Max: 99.8 (21 Aug 2020 23:50)  HR: 55 (22 Aug 2020 06:00) (48 - 76)  BP: 142/83 (22 Aug 2020 06:00) (125/65 - 167/86)  BP(mean): 107 (22 Aug 2020 06:00) (88 - 123)  ABP: --  ABP(mean): --  RR: 20 (22 Aug 2020 06:00) (11 - 32)  SpO2: 92% (22 Aug 2020 06:00) (92% - 98%)          I&O's Detail    21 Aug 2020 07:01  -  22 Aug 2020 07:00  --------------------------------------------------------  IN:  Total IN: 0 mL    OUT:    Voided: 1400 mL  Total OUT: 1400 mL    Total NET: -1400 mL            LABS:                        14.5   9.98  )-----------( 166      ( 22 Aug 2020 04:49 )             41.6     08-22    139  |  102  |  10.0  ----------------------------<  114<H>  3.8   |  25.0  |  0.83    Ca    8.6      22 Aug 2020 04:49  Phos  3.2     08-22  Mg     1.9     08-22    TPro  6.7  /  Alb  3.9  /  TBili  0.9  /  DBili  x   /  AST  171<H>  /  ALT  61<H>  /  AlkPhos  62  08-22      CARDIAC MARKERS ( 21 Aug 2020 03:51 )  x     / 0.06 ng/mL / x     / x     / x          CAPILLARY BLOOD GLUCOSE        PT/INR - ( 21 Aug 2020 03:51 )   PT: 13.2 sec;   INR: 1.15 ratio         PTT - ( 21 Aug 2020 03:51 )  PTT:>200.0 sec    CULTURES:      Physical Examination:    General: No acute distress.      HEENT: Pupils equal, reactive to light.  Symmetric.    PULM: Clear to auscultation bilaterally, no significant sputum production    NECK: Supple, no lymphadenopathy, trachea midline    CVS: Regular rate and rhythm, no murmurs, rubs, or gallops    ABD: Soft, nondistended, nontender, normoactive bowel sounds, no masses    EXT: No edema, nontender    SKIN: Warm and well perfused, no rashes noted.    NEURO: Alert, oriented, interactive, nonfocal    DEVICES: none    RADIOLOGY: reviewed

## 2020-08-23 ENCOUNTER — TRANSCRIPTION ENCOUNTER (OUTPATIENT)
Age: 50
End: 2020-08-23

## 2020-08-23 VITALS
OXYGEN SATURATION: 100 % | TEMPERATURE: 99 F | RESPIRATION RATE: 20 BRPM | HEART RATE: 70 BPM | SYSTOLIC BLOOD PRESSURE: 115 MMHG | DIASTOLIC BLOOD PRESSURE: 72 MMHG

## 2020-08-23 LAB
ANION GAP SERPL CALC-SCNC: 13 MMOL/L — SIGNIFICANT CHANGE UP (ref 5–17)
BUN SERPL-MCNC: 14 MG/DL — SIGNIFICANT CHANGE UP (ref 8–20)
CALCIUM SERPL-MCNC: 9.1 MG/DL — SIGNIFICANT CHANGE UP (ref 8.6–10.2)
CHLORIDE SERPL-SCNC: 100 MMOL/L — SIGNIFICANT CHANGE UP (ref 98–107)
CO2 SERPL-SCNC: 27 MMOL/L — SIGNIFICANT CHANGE UP (ref 22–29)
CREAT SERPL-MCNC: 0.92 MG/DL — SIGNIFICANT CHANGE UP (ref 0.5–1.3)
GLUCOSE SERPL-MCNC: 96 MG/DL — SIGNIFICANT CHANGE UP (ref 70–99)
HCT VFR BLD CALC: 44.9 % — SIGNIFICANT CHANGE UP (ref 39–50)
HGB BLD-MCNC: 15.1 G/DL — SIGNIFICANT CHANGE UP (ref 13–17)
MCHC RBC-ENTMCNC: 30.8 PG — SIGNIFICANT CHANGE UP (ref 27–34)
MCHC RBC-ENTMCNC: 33.6 GM/DL — SIGNIFICANT CHANGE UP (ref 32–36)
MCV RBC AUTO: 91.6 FL — SIGNIFICANT CHANGE UP (ref 80–100)
PLATELET # BLD AUTO: 176 K/UL — SIGNIFICANT CHANGE UP (ref 150–400)
POTASSIUM SERPL-MCNC: 4 MMOL/L — SIGNIFICANT CHANGE UP (ref 3.5–5.3)
POTASSIUM SERPL-SCNC: 4 MMOL/L — SIGNIFICANT CHANGE UP (ref 3.5–5.3)
RBC # BLD: 4.9 M/UL — SIGNIFICANT CHANGE UP (ref 4.2–5.8)
RBC # FLD: 12.4 % — SIGNIFICANT CHANGE UP (ref 10.3–14.5)
SODIUM SERPL-SCNC: 140 MMOL/L — SIGNIFICANT CHANGE UP (ref 135–145)
WBC # BLD: 8.85 K/UL — SIGNIFICANT CHANGE UP (ref 3.8–10.5)
WBC # FLD AUTO: 8.85 K/UL — SIGNIFICANT CHANGE UP (ref 3.8–10.5)

## 2020-08-23 PROCEDURE — 99239 HOSP IP/OBS DSCHRG MGMT >30: CPT

## 2020-08-23 RX ORDER — TICAGRELOR 90 MG/1
1 TABLET ORAL
Qty: 180 | Refills: 3
Start: 2020-08-23 | End: 2021-08-17

## 2020-08-23 RX ORDER — LISINOPRIL 2.5 MG/1
1 TABLET ORAL
Qty: 30 | Refills: 3
Start: 2020-08-23 | End: 2020-12-20

## 2020-08-23 RX ORDER — ATORVASTATIN CALCIUM 80 MG/1
1 TABLET, FILM COATED ORAL
Qty: 90 | Refills: 3
Start: 2020-08-23 | End: 2021-08-17

## 2020-08-23 RX ORDER — ASPIRIN/CALCIUM CARB/MAGNESIUM 324 MG
1 TABLET ORAL
Qty: 90 | Refills: 3
Start: 2020-08-23 | End: 2021-08-17

## 2020-08-23 RX ADMIN — LISINOPRIL 10 MILLIGRAM(S): 2.5 TABLET ORAL at 05:51

## 2020-08-23 RX ADMIN — TICAGRELOR 90 MILLIGRAM(S): 90 TABLET ORAL at 05:50

## 2020-08-23 RX ADMIN — Medication 81 MILLIGRAM(S): at 08:44

## 2020-08-23 RX ADMIN — CHLORHEXIDINE GLUCONATE 1 APPLICATION(S): 213 SOLUTION TOPICAL at 05:53

## 2020-08-23 NOTE — DISCHARGE NOTE NURSING/CASE MANAGEMENT/SOCIAL WORK - NSDCFUADDAPPT_GEN_ALL_CORE_FT
Please call [facility] - Dr. Flores to make an appointment within one week. Please call [facility] - Dr. Flores to make an appointment within one week.  PT A&OX4 independent, work full time for [email or web address]. Lives with spouse, agreeable to [facility]. PCP: Dr. Flip Rizo in [place]. RX: [facility] on [address], in [place].

## 2020-08-23 NOTE — DISCHARGE NOTE NURSING/CASE MANAGEMENT/SOCIAL WORK - NSDCPEWEB_GEN_ALL_CORE
[facility] for Tobacco Control website --- [email or web address] website --- [email or web address]

## 2020-08-23 NOTE — DISCHARGE NOTE NURSING/CASE MANAGEMENT/SOCIAL WORK - NSDCPEEMAIL_GEN_ALL_CORE
[facility] for Tobacco Control email [email or web address]

## 2020-08-23 NOTE — DISCHARGE NOTE NURSING/CASE MANAGEMENT/SOCIAL WORK - PATIENT PORTAL LINK FT
You can access the FollowMyHealth Patient Portal offered by [facility] by registering at the following website: [email or web address]. By joining [employer]’s FollowMyHealth portal, you will also be able to view your health information using other applications (apps) compatible with our system.

## 2020-08-23 NOTE — PROGRESS NOTE ADULT - SUBJECTIVE AND OBJECTIVE BOX
[facility], [facility]                                                    [address], [address], [place] [address]                                                         PHONE: [phone]    FAX: [phone] [address], [address], [place] [address]                                                 PHONE: [phone]    FAX: [phone]  *******************************************************************************    Overnight events/Subjective Assessment:  No new cardiovascular issues overnight.  No recurrent chest pain.  No SOB or palpitations.  + R shoulder soreness.    No Known Allergies    MEDICATIONS  (STANDING):  aspirin  chewable 81 milliGRAM(s) Oral daily  atorvastatin 80 milliGRAM(s) Oral at bedtime  chlorhexidine 2% Cloths 1 Application(s) Topical <User Schedule>  lisinopril 10 milliGRAM(s) Oral daily  ticagrelor 90 milliGRAM(s) Oral every 12 hours    MEDICATIONS  (PRN):  acetaminophen   Tablet .. 650 milliGRAM(s) Oral every 6 hours PRN Moderate Pain (4 - 6)  hydrALAZINE Injectable 10 milliGRAM(s) IV Push every 4 hours PRN sbp > 170  ondansetron Injectable 4 milliGRAM(s) IV Push every 6 hours PRN Nausea and/or Vomiting      Vital Signs Last 24 Hrs  T(C): 37.2 (23 Aug 2020 09:37), Max: 38 (22 Aug 2020 15:09)  T(F): 99 (23 Aug 2020 09:37), Max: 100.4 (22 Aug 2020 15:09)  HR: 70 (23 Aug 2020 09:37) (56 - 74)  BP: 115/72 (23 Aug 2020 09:37) (115/72 - 156/85)  BP(mean): 92 (22 Aug 2020 18:00) (92 - 114)  RR: 20 (23 Aug 2020 09:37) (18 - 27)  SpO2: 100% (23 Aug 2020 09:37) (93% - 100%)    I&O's Detail    22 Aug 2020 07:01  -  23 Aug 2020 07:00  --------------------------------------------------------  IN:  Total IN: 0 mL    OUT:    Voided: 1200 mL  Total OUT: 1200 mL    Total NET: -1200 mL      23 Aug 2020 07:01  -  23 Aug 2020 09:58  --------------------------------------------------------  IN:  Total IN: 0 mL    OUT:    Voided: 300 mL  Total OUT: 300 mL    Total NET: -300 mL        I&O's Summary    22 Aug 2020 07:01  -  23 Aug 2020 07:00  --------------------------------------------------------  IN: 0 mL / OUT: 1200 mL / NET: -1200 mL    23 Aug 2020 07:01  -  23 Aug 2020 09:58  --------------------------------------------------------  IN: 0 mL / OUT: 300 mL / NET: -300 mL            PHYSICAL EXAM:  General: Appears well developed, well nourished, no acute distress  HEENT: Head: normocephalic, atraumatic  Eyes: Pupils equal and reactive  Neck: Supple, no carotid bruit, no JVD, no HJR  CARDIOVASCULAR: Normal S1 and S2, no murmur, rub, or gallop  LUNGS: Clear to auscultation bilaterally, no rales, rhonchi or wheeze  ABDOMEN: Soft, nontender, non-distended, positive bowel sounds, no mass or bruit  EXTREMITIES: No edema, distal pulses WNL  SKIN: Warm and dry with normal turgor  NEURO: Alert & oriented x 3, grossly intact  PSYCH: normal mood and affect        LABS:                        15.1   8.85  )-----------( 176      ( 23 Aug 2020 07:14 )             44.9     08-23    140  |  100  |  14.0  ----------------------------<  96  4.0   |  27.0  |  0.92    Ca    9.1      23 Aug 2020 07:14  Phos  3.2     08-22  Mg     1.9     08-22    TPro  6.7  /  Alb  3.9  /  TBili  0.9  /  DBili  x   /  AST  171<H>  /  ALT  61<H>  /  AlkPhos  62  08-22          serum  Lipids:         RADIOLOGY & ADDITIONAL STUDIES:    TELEMETRY PERSONALLY REVIEWED: NSR 60-70s, no events    ECHO (8/22/20):  PHYSICIAN INTERPRETATION:  Left Ventricle: The left ventricular internal cavity size is normal.  Global LV systolic function was normal. Left ventricular ejection fraction, by visual estimation, is 60 to 65%. Normal LV filling pressures.  Right Ventricle: Normal right ventricular size and function. The right ventricular size is normal. RV systolic function is normal.  Left Atrium: The left atrium is normal in size.  Right Atrium: The right atrium is normal in size.  Pericardium: There is no evidence of pericardial effusion.  Mitral Valve: Trace mitral valve regurgitation is seen.  Tricuspid Valve: The tricuspid valve is normal in structure. Trivial tricuspid regurgitation is visualized.  Aortic Valve: The aortic valve is trileaflet. Peak transaortic gradient equals 8.8 mmHg, mean transaortic gradient equals 4.2 mmHg, the calculated aortic valve area equals 3.11 cm² by the continuity equation consistent with normally opening aortic valve. No evidence of aortic valve regurgitation is seen.  Pulmonic Valve: The pulmonic valve is normal. No indication of pulmonic valve regurgitation.  Aorta: The aortic root and ascending aorta are structurally normal, with no evidence of dilitation. The aortic arch was not well visualized. Aortic root measured at Sinus of Valsalva is normal.  Pulmonary Artery: The pulmonary artery is of normal size and origin.  Venous: The pulmonary veins appear normal. The inferior vena cava is normal. The inferior vena cava was normal sized, with respiratory size variation greater than 50%.  In comparison to the previous echocardiogram(s): There are no prior studies on this patient for comparison purposes.  Summary:   1. Technically fair study.   2. Endocardium opacified with echocontrast.   3. Left ventricular ejection fraction, by visual estimation, is 60 to 65%.   4. Normal global left ventricular systolic function.   5. There is mild concentric left ventricular hypertrophy.   6. Normal diastolic function.   7. Normal RV size and function, inadequate estimation of RVSP.   8. There is no evidence of pericardial effusion.        ASSESSMENT AND PLAN:  In summary, CHASE GALVIN is a 50M a/w inferior wall STEMI s/p PCI/TU m&dRCA x2 8/20/20, recurrent chest pain s/p PCI/TU pLAD, mLAD & dLCx 8/21/20, h/o HTN, HL, obesity  - Patient clinically stable s/p repeat PCI/stents.  Continue medical management of known severe CAD.  - Echocardiogram 8/22/20 demonstrated normal LV fxn (EF 60-65%), trace MR, trace TR  - Rhythm stable NSR with intermittent sinus bradycardia, BP stable.  Continue current dose of Lisinopril 10 daily for now and titrate PRN.  B-blocker deferred for now given intermittent sinus bradycardia.  - ASA 81 & Brilinta 90 bid in place  - Lipitor 80 daily in place (lipids 8/21/20: , , HDL 49, Trig 38)  - HbA1c 5.2 on 8/21/20  - OOB/ambulate  - Plan for d/c home this AM with outpatient cardiac follow-up at [facility] in 1 week.  Will sign off for now.  Please call with any cardiovascular questions/issues.    Alec Miles MD

## 2020-08-25 DIAGNOSIS — M79.603 PAIN IN ARM, UNSPECIFIED: ICD-10-CM

## 2020-08-26 ENCOUNTER — RESULT REVIEW (OUTPATIENT)
Age: 50
End: 2020-08-26

## 2020-08-26 ENCOUNTER — APPOINTMENT (OUTPATIENT)
Dept: ULTRASOUND IMAGING | Facility: CLINIC | Age: 50
End: 2020-08-26
Payer: COMMERCIAL

## 2020-08-26 ENCOUNTER — OUTPATIENT (OUTPATIENT)
Dept: OUTPATIENT SERVICES | Facility: HOSPITAL | Age: 50
LOS: 1 days | End: 2020-08-26

## 2020-08-26 DIAGNOSIS — M79.603 PAIN IN ARM, UNSPECIFIED: ICD-10-CM

## 2020-08-26 PROCEDURE — 93971 EXTREMITY STUDY: CPT | Mod: 26,RT

## 2020-08-26 PROCEDURE — 93931 UPPER EXTREMITY STUDY: CPT | Mod: 26,RT

## 2020-08-27 ENCOUNTER — APPOINTMENT (OUTPATIENT)
Dept: CARDIOLOGY | Facility: CLINIC | Age: 50
End: 2020-08-27
Payer: COMMERCIAL

## 2020-08-27 VITALS
WEIGHT: 236 LBS | DIASTOLIC BLOOD PRESSURE: 80 MMHG | SYSTOLIC BLOOD PRESSURE: 120 MMHG | OXYGEN SATURATION: 94 % | TEMPERATURE: 97.1 F | BODY MASS INDEX: 31.28 KG/M2 | HEIGHT: 73 IN | HEART RATE: 60 BPM | RESPIRATION RATE: 16 BRPM

## 2020-08-27 VITALS — SYSTOLIC BLOOD PRESSURE: 120 MMHG | DIASTOLIC BLOOD PRESSURE: 80 MMHG

## 2020-08-27 DIAGNOSIS — Z78.9 OTHER SPECIFIED HEALTH STATUS: ICD-10-CM

## 2020-08-27 PROCEDURE — 93000 ELECTROCARDIOGRAM COMPLETE: CPT

## 2020-08-27 PROCEDURE — 99204 OFFICE O/P NEW MOD 45 MIN: CPT

## 2020-08-27 RX ORDER — METHYLPREDNISOLONE 4 MG/1
4 TABLET ORAL
Qty: 21 | Refills: 0 | Status: COMPLETED | COMMUNITY
Start: 2019-07-05 | End: 2020-08-27

## 2020-08-27 RX ORDER — INDOMETHACIN 75 MG/1
75 CAPSULE, EXTENDED RELEASE ORAL TWICE DAILY
Qty: 40 | Refills: 0 | Status: COMPLETED | COMMUNITY
Start: 2019-06-05 | End: 2020-08-27

## 2020-08-27 RX ORDER — COLCHICINE 0.6 MG/1
0.6 TABLET ORAL
Qty: 3 | Refills: 0 | Status: COMPLETED | COMMUNITY
Start: 2019-07-05 | End: 2020-08-27

## 2020-09-01 ENCOUNTER — APPOINTMENT (OUTPATIENT)
Dept: CARDIOLOGY | Facility: CLINIC | Age: 50
End: 2020-09-01

## 2020-09-03 ENCOUNTER — INPATIENT (INPATIENT)
Facility: HOSPITAL | Age: 50
LOS: 1 days | Discharge: HOME | DRG: 287 | End: 2020-09-05
Attending: HOSPITALIST | Admitting: FAMILY MEDICINE
Payer: COMMERCIAL

## 2020-09-03 VITALS
HEIGHT: 69 IN | RESPIRATION RATE: 16 BRPM | HEART RATE: 62 BPM | SYSTOLIC BLOOD PRESSURE: 111 MMHG | DIASTOLIC BLOOD PRESSURE: 60 MMHG | TEMPERATURE: 98 F | WEIGHT: 190.04 LBS | OXYGEN SATURATION: 98 %

## 2020-09-03 DIAGNOSIS — Z98.890 OTHER SPECIFIED POSTPROCEDURAL STATES: Chronic | ICD-10-CM

## 2020-09-03 DIAGNOSIS — R07.9 CHEST PAIN, UNSPECIFIED: ICD-10-CM

## 2020-09-03 LAB
ALBUMIN SERPL ELPH-MCNC: 3.9 G/DL — SIGNIFICANT CHANGE UP (ref 3.3–5.2)
ALP SERPL-CCNC: 80 U/L — SIGNIFICANT CHANGE UP (ref 40–120)
ALT FLD-CCNC: 21 U/L — SIGNIFICANT CHANGE UP
ANION GAP SERPL CALC-SCNC: 11 MMOL/L — SIGNIFICANT CHANGE UP (ref 5–17)
APTT BLD: 30.5 SEC — SIGNIFICANT CHANGE UP (ref 27.5–35.5)
AST SERPL-CCNC: 19 U/L — SIGNIFICANT CHANGE UP
BASOPHILS # BLD AUTO: 0.1 K/UL — SIGNIFICANT CHANGE UP (ref 0–0.2)
BASOPHILS NFR BLD AUTO: 1.1 % — SIGNIFICANT CHANGE UP (ref 0–2)
BILIRUB SERPL-MCNC: 0.2 MG/DL — LOW (ref 0.4–2)
BUN SERPL-MCNC: 11 MG/DL — SIGNIFICANT CHANGE UP (ref 8–20)
CALCIUM SERPL-MCNC: 9 MG/DL — SIGNIFICANT CHANGE UP (ref 8.6–10.2)
CHLORIDE SERPL-SCNC: 100 MMOL/L — SIGNIFICANT CHANGE UP (ref 98–107)
CO2 SERPL-SCNC: 27 MMOL/L — SIGNIFICANT CHANGE UP (ref 22–29)
CREAT SERPL-MCNC: 0.93 MG/DL — SIGNIFICANT CHANGE UP (ref 0.5–1.3)
EOSINOPHIL # BLD AUTO: 0.27 K/UL — SIGNIFICANT CHANGE UP (ref 0–0.5)
EOSINOPHIL NFR BLD AUTO: 3.1 % — SIGNIFICANT CHANGE UP (ref 0–6)
GLUCOSE SERPL-MCNC: 85 MG/DL — SIGNIFICANT CHANGE UP (ref 70–99)
HCT VFR BLD CALC: 42.4 % — SIGNIFICANT CHANGE UP (ref 39–50)
HGB BLD-MCNC: 14.3 G/DL — SIGNIFICANT CHANGE UP (ref 13–17)
IMM GRANULOCYTES NFR BLD AUTO: 0.7 % — SIGNIFICANT CHANGE UP (ref 0–1.5)
INR BLD: 1.07 RATIO — SIGNIFICANT CHANGE UP (ref 0.88–1.16)
LYMPHOCYTES # BLD AUTO: 2.46 K/UL — SIGNIFICANT CHANGE UP (ref 1–3.3)
LYMPHOCYTES # BLD AUTO: 27.9 % — SIGNIFICANT CHANGE UP (ref 13–44)
MCHC RBC-ENTMCNC: 30.7 PG — SIGNIFICANT CHANGE UP (ref 27–34)
MCHC RBC-ENTMCNC: 33.7 GM/DL — SIGNIFICANT CHANGE UP (ref 32–36)
MCV RBC AUTO: 91 FL — SIGNIFICANT CHANGE UP (ref 80–100)
MONOCYTES # BLD AUTO: 0.79 K/UL — SIGNIFICANT CHANGE UP (ref 0–0.9)
MONOCYTES NFR BLD AUTO: 8.9 % — SIGNIFICANT CHANGE UP (ref 2–14)
NEUTROPHILS # BLD AUTO: 5.15 K/UL — SIGNIFICANT CHANGE UP (ref 1.8–7.4)
NEUTROPHILS NFR BLD AUTO: 58.3 % — SIGNIFICANT CHANGE UP (ref 43–77)
PLATELET # BLD AUTO: 280 K/UL — SIGNIFICANT CHANGE UP (ref 150–400)
POTASSIUM SERPL-MCNC: 4.2 MMOL/L — SIGNIFICANT CHANGE UP (ref 3.5–5.3)
POTASSIUM SERPL-SCNC: 4.2 MMOL/L — SIGNIFICANT CHANGE UP (ref 3.5–5.3)
PROT SERPL-MCNC: 6.9 G/DL — SIGNIFICANT CHANGE UP (ref 6.6–8.7)
PROTHROM AB SERPL-ACNC: 12.4 SEC — SIGNIFICANT CHANGE UP (ref 10.6–13.6)
RBC # BLD: 4.66 M/UL — SIGNIFICANT CHANGE UP (ref 4.2–5.8)
RBC # FLD: 11.8 % — SIGNIFICANT CHANGE UP (ref 10.3–14.5)
SODIUM SERPL-SCNC: 138 MMOL/L — SIGNIFICANT CHANGE UP (ref 135–145)
TROPONIN T SERPL-MCNC: 0.07 NG/ML — HIGH (ref 0–0.06)
WBC # BLD: 8.83 K/UL — SIGNIFICANT CHANGE UP (ref 3.8–10.5)
WBC # FLD AUTO: 8.83 K/UL — SIGNIFICANT CHANGE UP (ref 3.8–10.5)

## 2020-09-03 PROCEDURE — 71045 X-RAY EXAM CHEST 1 VIEW: CPT | Mod: 26

## 2020-09-03 PROCEDURE — 93308 TTE F-UP OR LMTD: CPT | Mod: 26

## 2020-09-03 PROCEDURE — 93010 ELECTROCARDIOGRAM REPORT: CPT

## 2020-09-03 PROCEDURE — 99285 EMERGENCY DEPT VISIT HI MDM: CPT

## 2020-09-03 PROCEDURE — 99223 1ST HOSP IP/OBS HIGH 75: CPT

## 2020-09-03 RX ORDER — HEPARIN SODIUM 5000 [USP'U]/ML
5000 INJECTION INTRAVENOUS; SUBCUTANEOUS ONCE
Refills: 0 | Status: COMPLETED | OUTPATIENT
Start: 2020-09-03 | End: 2020-09-04

## 2020-09-03 RX ORDER — HEPARIN SODIUM 5000 [USP'U]/ML
INJECTION INTRAVENOUS; SUBCUTANEOUS
Qty: 25000 | Refills: 0 | Status: DISCONTINUED | OUTPATIENT
Start: 2020-09-03 | End: 2020-09-04

## 2020-09-03 RX ORDER — LISINOPRIL 2.5 MG/1
10 TABLET ORAL DAILY
Refills: 0 | Status: DISCONTINUED | OUTPATIENT
Start: 2020-09-03 | End: 2020-09-04

## 2020-09-03 RX ORDER — HEPARIN SODIUM 5000 [USP'U]/ML
6000 INJECTION INTRAVENOUS; SUBCUTANEOUS EVERY 6 HOURS
Refills: 0 | Status: DISCONTINUED | OUTPATIENT
Start: 2020-09-03 | End: 2020-09-04

## 2020-09-03 RX ORDER — NITROGLYCERIN 6.5 MG
0.4 CAPSULE, EXTENDED RELEASE ORAL
Refills: 0 | Status: DISCONTINUED | OUTPATIENT
Start: 2020-09-03 | End: 2020-09-05

## 2020-09-03 RX ORDER — ATORVASTATIN CALCIUM 80 MG/1
80 TABLET, FILM COATED ORAL AT BEDTIME
Refills: 0 | Status: DISCONTINUED | OUTPATIENT
Start: 2020-09-03 | End: 2020-09-05

## 2020-09-03 RX ORDER — TICAGRELOR 90 MG/1
90 TABLET ORAL
Refills: 0 | Status: DISCONTINUED | OUTPATIENT
Start: 2020-09-03 | End: 2020-09-05

## 2020-09-03 RX ORDER — ASPIRIN/CALCIUM CARB/MAGNESIUM 324 MG
81 TABLET ORAL DAILY
Refills: 0 | Status: DISCONTINUED | OUTPATIENT
Start: 2020-09-03 | End: 2020-09-05

## 2020-09-03 RX ADMIN — TICAGRELOR 90 MILLIGRAM(S): 90 TABLET ORAL at 23:40

## 2020-09-03 RX ADMIN — ATORVASTATIN CALCIUM 80 MILLIGRAM(S): 80 TABLET, FILM COATED ORAL at 23:40

## 2020-09-03 NOTE — CONSULT NOTE ADULT - SUBJECTIVE AND OBJECTIVE BOX
[facility] [place]                                                                                                 ([facility])                                                                                                 CONSULTATION NOTE       History obtained by: Patient, family and medical record     obtained: No    Primary Cardiologist: Pt used to FU with [facility], Stating he is to FU with [facility] with Dr. [place]  Due to Insurance reasons     Chief complaint:    Patient is a 50y old  Male who presents with a chief complaint of chest pain    HPI:      50 M former tobacco user, borderline HTN, HLD, adopted and hence unclear family history, IWSTEMI 8/20/2020 s/p 5 TU in [facility]  initial cath 8/20/2020 initial inferior STEMI (RCA) resulted in dsital RCA TU and mid RCA TU and then followed by a cath on 8/21/2020 Indication Recurrent CP following PCI of the RCA last night with IWSTEMI resulted in mid LAD TU, proximal LAD TU, distal Cx TU, RCA stents were patent) Echo 8/22/2020 LVEF 60-75, Nl RV, ml cLVH. Pt was doing well until today when he started to feel CP at about 2-3 pm  in the precordial area, felt like continuous pressure, lasted 2-3 hrs, was mild 2-3/10 intensity, nonradiated, nonexertional, denied associated gins and symptoms denied modifying factors. The Chest pain is similar to his episodes prior to stenting although much milder. Pt called ambulance and came toto ER. When he called 911 he was told to take ASA he states he took ASA and CP was resolved. Currently he is free of CP. Compliant with mediation, diet, exercise  Denied dyspnea, orthopnea pnd edema,  palpitation nausea, vomiting, hematuria, melena, syncope, near syncope.     REVIEW OF SYMPTOMS:   Constitutional: Denied: fever, chills, weight loss or gain  Eyes: Denied: reddened ayes, eye discharge, eye pain  ENMT: Denied: ear pain, nasal discharge, mouth pain, throat pain or swelling  Cardiovascular: Denied: palpitation, arrhythmia, irregular or fast heart beats, edema  Respiratory: Denied: cough, phlegm production, wheezes, dyspnea, orthopnea, PND,   GI: Denied: Abdominal pain, nausea, vomiting, diarrhea, constipation, melena, rectal bleed  : Denied: hematuria, frequency  Musculoskeletal: Denied: Muscle aches, weakness, pain  Hematology/lymp: Denied: Bleeding disorder, anemia, blood clotting  Neuro: Denied: Headache, light headedness, dizziness, numbness, aphasia, dysarthria, seizure,  syncope, near syncope  Psych: Denied: depression, anxiety  Integumentary/skin: Denied: rash, bruises, ecchymosis, itching  Allergy/Immunology: denied environmental or drug allergies    ALL OTHER REVIEW OF SYSTEMS ARE NEGATIVE.    MEDICATIONS  (STANDING):  Current Meds  Aspirin 81 81 MG Oral Tablet Chewable; CHEW AND SWALLOW 1 TABLET BY MOUTH  EVERY DAY  Atorvastatin Calcium 80 MG Oral Tablet; ONE TABLET BY MOUTH ONCE DAILY  Brilinta 90 MG Oral Tablet; TAKE 1 TABLET TWICE DAILY  Lisinopril 10 MG Oral Tablet; TAKE 1 TABLET DAILY  PredniSONE 10 MG Oral Tablet; TAKE TABLET Other 30mg, 30mg, 20mg 20 mg, 10 mg,  10mg    Allergies  No Known Drug Allergies        PAST MEDICAL & SURGICAL HISTORY:  Gout  H/O cardiac catheterization: x 5 stents      FAMILY HISTORY:  No CVD FHx  Non-Contributory    SOCIAL HISTORY:    CIGARETTES:  Former tobacco chewing    ALCOHOL: Denies    DRUGS: Denies    Vital Signs Last 24 Hrs  T(C): 36.9 (03 Sep 2020 19:12), Max: 36.9 (03 Sep 2020 19:12)  T(F): 98.4 (03 Sep 2020 19:12), Max: 98.4 (03 Sep 2020 19:12)  HR: 48 (03 Sep 2020 19:12) (48 - 62)  BP: 121/75 (03 Sep 2020 19:12) (111/60 - 121/75)  BP(mean): --  RR: 18 (03 Sep 2020 19:12) (16 - 18)  SpO2: 97% (03 Sep 2020 19:12) (97% - 98%)    PHYSICAL EXAM:    Constitutional: No fever, chills, NAD, Comfortable    Eyes: Not reddened, no discharge    ENMT: No discharge, No pain    Neck: No JVD, No Bruit    Back: No CVA tenderness    Respiratory: Clear to auscultation bilaterally    Cardiovascular: RRR, Normal S1-2, No S3-, No friction rub murmur    Gastrointestinal: Soft, NT/ND. BS+, No organomegaly    Extremities: No edema    Vascular: Distal pulses intact    Neurological: Alert, awake, oriented, able to move extremities, speech is clear    Skin: No ecchymosis, no rash    Musculoskeletal: Non tender, no weakness    Psychiatric: Appropriate mood, no anxiety      INTERPRETATION OF TELEMETRY:    ECG: EKG SR 59 bpm Inferior-Posterior infract (Unchanged from prior)      All Labs - Pending

## 2020-09-03 NOTE — ED ADULT NURSE REASSESSMENT NOTE - NS ED NURSE REASSESS COMMENT FT1
Patient remains in stable condition, A/ox4. Denies any chest pain, SOB. SB on monitor, MD aware. Aware of NPO status, Updated on POC. Transferring patient to CDU.

## 2020-09-03 NOTE — ED ADULT NURSE REASSESSMENT NOTE - NS ED NURSE REASSESS COMMENT FT1
Off the unit for Echo with RN, patient tolerating procedure, Definity pushed by writer. No reaction noted. Continue to monitor.

## 2020-09-03 NOTE — H&P ADULT - HISTORY OF PRESENT ILLNESS
51 y/o male came to the ER for chest pain.  pt. is a former tobacco user, borderline HTN, HLD, adopted and hence unclear family history, IWSTEMI 8/20/2020 s/p 5 TU in [facility]  initial cath 8/20/2020 initial inferior STEMI (RCA) resulted in dsital RCA TU and mid RCA TU and then followed by a cath on 8/21/2020 Indication Recurrent CP following PCI of the RCA last night with IWSTEMI resulted in mid LAD TU, proximal LAD TU, distal Cx TU, RCA stents were patent) Echo 8/22/2020 LVEF 60-75, Nl RV, ml cLVH. Today pt. started to feel CP at about 2-3 pm  in left side of his chest, felt like continuous pressure, lasted about 2 hrs,  mild 2-3/10 intensity, nonradiated, nonexertional, No sob, The Chest pain is similar to his episodes prior to stenting although milder. Pt called ambulance and came toto ER. When he called 911 he was told to take ASA he states he took ASA and CP was resolved. no cp at the time of admission, Compliant with mediation,  no dizziness, no syncopy, no cough, no fever, no palpitations, no nausea/vomiting, no hematuria, no melena. no abd. pain. 49 y/o male came to the ER for chest pain.  pt. is a former tobacco user, borderline HTN, HLD, adopted and hence unclear family history, IWSTEMI 8/20/2020 s/p 5 TU in [facility]  initial cath 8/20/2020 initial inferior STEMI (RCA) resulted in dsital RCA TU and mid RCA TU and then followed by a cath on 8/21/2020 Indication Recurrent CP following PCI of the RCA last night with IWSTEMI resulted in mid LAD TU, proximal LAD TU, distal Cx TU, RCA stents were patent) Echo 8/22/2020 LVEF 60-75, Nl RV, ml cLVH. Today pt. started to feel CP at about 2-3 pm  in left side of his chest, felt like continuous pressure, lasted about 2 hrs,  mild 2-3/10 intensity, nonradiated, nonexertional, No sob, The Chest pain is similar to his episodes prior to stenting although milder. Pt called ambulance and came toto ER. When he called 911 he was told to take ASA he states he took ASA and CP was resolved. no cp at the time of admission, Compliant with mediation,  no dizziness, no syncopy, no cough, no fever, no palpitations, no nausea/vomiting, no hematuria, no melena. no abd. pain. In the ER pt. was blake into upper 50's and sometimes goes to upper 40's.

## 2020-09-03 NOTE — ED PROVIDER NOTE - PROGRESS NOTE DETAILS
PT. seen by cardiologist. They recommend serial CE and Echo. Pt. with slightly elevated troponin. EKG shows no changes. ICU consulted as per Dr. Erazo. PT. denies any worsening in chest pain.

## 2020-09-03 NOTE — PROGRESS NOTE ADULT - SUBJECTIVE AND OBJECTIVE BOX
Patient is a 50y old  Male who presents with a chief complaint of chest pain (03 Sep 2020 22:52)    HPI:    50M w/ PMHx HTN, CAD s/p recent PCI came in for evaluation of chest pain. He was recently admitted on 08/20 for an Inf wall STEMI and underwent PCI to RCAx 2 and for ongoing chest pain another PCI to LADx 3 the following day. He came in today w/ low grade, non radiating chest pain when started at rest. Pain is not pleuritic or musculoskeletal. Not associated w/ diaphoresis. SOB, dizziness or LOC. Compliant with meds. Took 4tabs ASA prior to coming in.   No recent fever, N/V or cough. Recent TTE w/ normal EF. No new/obvious changes on EKG (Q waves and TWI in inferior leads). Trop 0.07. COVID neg. ?LLL opacity on CXR  Pt was hemodynamically stable on ICU evaluation    PAST MEDICAL & SURGICAL HISTORY:  HTN (hypertension)  CAD (coronary artery disease)  Gout  H/O cardiac catheterization: x 5 stents    Review of Systems:  CONSTITUTIONAL: No fever, chills, or fatigue  EYES: No eye pain, visual disturbances, or discharge  ENMT:  No difficulty hearing, tinnitus, vertigo; No sinus or throat pain  NECK: No pain or stiffness  RESPIRATORY: No cough, wheezing, chills or hemoptysis; No shortness of breath  CARDIOVASCULAR: No palpitations, dizziness, or leg swelling. +ve chest pain   GASTROINTESTINAL: No abdominal or epigastric pain. No nausea, vomiting, or hematemesis; No diarrhea or constipation. No melena or hematochezia.  GENITOURINARY: No dysuria, frequency, hematuria, or incontinence  NEUROLOGICAL: No headaches, memory loss, loss of strength, numbness, or tremors  SKIN: No itching, burning, rashes, or lesions   MUSCULOSKELETAL: No joint pain or swelling; No muscle, back, or extremity pain  PSYCHIATRIC: No depression, anxiety, mood swings, or difficulty sleeping    Physical Examination:    ICU Vital Signs Last 24 Hrs  T(C): 36.9 (03 Sep 2020 19:12), Max: 36.9 (03 Sep 2020 19:12)  T(F): 98.4 (03 Sep 2020 19:12), Max: 98.4 (03 Sep 2020 19:12)  HR: 48 (03 Sep 2020 19:12) (48 - 62)  BP: 121/75 (03 Sep 2020 19:12) (111/60 - 121/75)  BP(mean): --  ABP: --  ABP(mean): --  RR: 18 (03 Sep 2020 19:12) (16 - 18)  SpO2: 97% (03 Sep 2020 19:12) (97% - 98%)      General: No acute distress.      Neuro: AAO*3, No motor, sensory, or cranial nerve deficit    HEENT: Pupils equal, reactive to light, Moist oral mucosa    PULM: Clear to auscultation bilaterally, no significant adventitious breath sounds     CVS: Regular rhythm and controlled rate, no murmurs, rubs, or gallops    ABD: Soft, nondistended, nontender, normoactive bowel sounds    EXT: No b/l LE edema, nontender with pedal pulse palpable     SKIN: Warm and well perfused, no acute rashes       Medications:    lisinopril 10 milliGRAM(s) Oral daily  nitroglycerin     SubLingual 0.4 milliGRAM(s) SubLingual every 5 minutes PRN          aspirin  chewable 81 milliGRAM(s) Oral daily  heparin   Injectable 5000 Unit(s) IV Push once  heparin   Injectable 6000 Unit(s) IV Push every 6 hours PRN  heparin  Infusion.  Unit(s)/Hr IV Continuous <Continuous>  ticagrelor 90 milliGRAM(s) Oral two times a day        atorvastatin 80 milliGRAM(s) Oral at bedtime                  I&O's Detail        RADIOLOGY/ Microbiology/ Labs: reviewed

## 2020-09-03 NOTE — CONSULT NOTE ADULT - ASSESSMENT
50 M former tobacco user, borderline HTN, HLD, adopted and hence unclear family history, IWSTEMI 8/20/2020 s/p 5 TU in [facility]  initial cath 8/20/2020 initial inferior STEMI (RCA) resulted in dsital RCA TU and mid RCA TU and then followed by a cath on 8/21/2020 Indication Recurrent CP following PCI of the RCA last night with IWSTEMI resulted in mid LAD TU, proximal LAD TU, distal Cx TU, RCA stents were patent) Echo 8/22/2020 LVEF 60-75, Nl RV, ml cLVH. Pt was doing well until today when he started to feel CP at about 2-3 pm  in the precordial area, felt like continuous pressure, lasted 2-3 hrs, was mild 2-3/10 intensity, nonradiated, nonexertional, denied associated gins and symptoms denied modifying factors. The Chest pain is similar to his episodes prior to stenting although much milder. Pt called ambulance and came toto ER. When he called 911 he was told to take ASA he states he took ASA and CP was resolved. Currently he is free of CP. Compliant with mediation, diet, exercise  Denied dyspnea, orthopnea pnd edema,  palpitation nausea, vomiting, hematuria, melena, syncope, near syncope.         CAD h/o IWSTEMI s/p 5 TU 8/2020  Chest Pain: CP currently resolved.   Serial CE, PBNP,  EKG, Echo  Ischemia SWANSON: Depending on the results  Continue:  Aspirin 81mg daily  Atorvastatin Calcium 80 mg daily  Brilinta 90 mg twice daily  Lisinopril 10 daily    Bradycardia on EKG   Telemetry Monitoring 50 M former tobacco user, borderline HTN, HLD, adopted and hence unclear family history, IWSTEMI 8/20/2020 s/p 5 TU in [facility]  initial cath 8/20/2020 initial inferior STEMI (RCA) resulted in dsital RCA TU and mid RCA TU and then followed by a cath on 8/21/2020 Indication Recurrent CP following PCI of the RCA last night with IWSTEMI resulted in mid LAD TU, proximal LAD TU, distal Cx TU, RCA stents were patent) Echo 8/22/2020 LVEF 60-75, Nl RV, ml cLVH. Pt was doing well until today when he started to feel CP at about 2-3 pm  in the precordial area, felt like continuous pressure, lasted 2-3 hrs, was mild 2-3/10 intensity, nonradiated, nonexertional, denied associated gins and symptoms denied modifying factors. The Chest pain is similar to his episodes prior to stenting although much milder. Pt called ambulance and came toto ER. When he called 911 he was told to take ASA he states he took ASA and CP was resolved. Currently he is free of CP. Compliant with mediation, diet, exercise  Denied dyspnea, orthopnea pnd edema,  palpitation nausea, vomiting, hematuria, melena, syncope, near syncope.         CAD h/o IWSTEMI s/p 5 TU 8/2020  Chest Pain: CP currently resolved.   Serial CE, PBNP,  EKG, Echo  Ischemia SWANSON: Depending on the results, keep NPO after MN except for meds  Continue:  Aspirin 81mg daily  Atorvastatin Calcium 80 mg daily  Brilinta 90 mg twice daily  Lisinopril 10 daily    Bradycardia on EKG   Not on BB due to bradycardia  Telemetry Monitoring 50 M former tobacco user, borderline HTN, HLD, adopted and hence unclear family history, IWSTEMI 8/20/2020 s/p 5 TU in [facility]  initial cath 8/20/2020 initial inferior STEMI (RCA) resulted in dsital RCA TU and mid RCA TU and then followed by a cath on 8/21/2020 Indication Recurrent CP following PCI of the RCA last night with IWSTEMI resulted in mid LAD TU, proximal LAD TU, distal Cx TU, RCA stents were patent) Echo 8/22/2020 LVEF 60-75, Nl RV, ml cLVH. Pt was doing well until today when he started to feel CP at about 2-3 pm  in the precordial area, felt like continuous pressure, lasted 2-3 hrs, was mild 2-3/10 intensity, nonradiated, nonexertional, denied associated gins and symptoms denied modifying factors. The Chest pain is similar to his episodes prior to stenting although much milder. Pt called ambulance and came toto ER. When he called 911 he was told to take ASA he states he took ASA and CP was resolved. Currently he is free of CP. Compliant with mediation, diet, exercise  Denied dyspnea, orthopnea pnd edema,  palpitation nausea, vomiting, hematuria, melena, syncope, near syncope.         CAD h/o IWSTEMI s/p 5 TU 8/2020  UA: Chest Pain: CP currently resolved.   Serial CE, PBNP,  EKG, Echo  Ischemia SWANSON: Keep NPO after MN except for meds, likely cath, will d/w Interv Cardio.  All labs pending.   IV Heparin  Continue:  Aspirin 81mg daily  Atorvastatin Calcium 80 mg daily  Brilinta 90 mg twice daily  Lisinopril 10 daily    Bradycardia on EKG   Not on BB due to bradycardia  Telemetry Monitoring    d/w ER team. 50 M former tobacco user, borderline HTN, HLD, adopted and hence unclear family history, IWSTEMI 8/20/2020 s/p 5 TU in [facility]  initial cath 8/20/2020 initial inferior STEMI (RCA) resulted in dsital RCA TU and mid RCA TU and then followed by a cath on 8/21/2020 Indication Recurrent CP following PCI of the RCA last night with IWSTEMI resulted in mid LAD TU, proximal LAD TU, distal Cx TU, RCA stents were patent) Echo 8/22/2020 LVEF 60-75, Nl RV, ml cLVH. Pt was doing well until today when he started to feel CP at about 2-3 pm  in the precordial area, felt like continuous pressure, lasted 2-3 hrs, was mild 2-3/10 intensity, nonradiated, nonexertional, denied associated gins and symptoms denied modifying factors. The Chest pain is similar to his episodes prior to stenting although much milder. Pt called ambulance and came toto ER. When he called 911 he was told to take ASA he states he took ASA and CP was resolved. Currently he is free of CP. Compliant with mediation, diet, exercise  Denied dyspnea, orthopnea pnd edema,  palpitation nausea, vomiting, hematuria, melena, syncope, near syncope.         CAD h/o IWSTEMI s/p 5 TU 8/2020  UA: Chest Pain: CP currently resolved.   Serial CE, PBNP,  EKG, Echo  Ischemia SWANSON: Keep NPO after MN except for meds, likely cath, will d/w Interv Cardio.  All labs pending.   IV Heparin  Continue:  Aspirin 81mg daily  Atorvastatin Calcium 80 mg daily  Brilinta 90 mg twice daily  Lisinopril 10 daily    Bradycardia on EKG   Not on BB due to bradycardia  Telemetry Monitoring  HR  shows drops from 58 to 48 on monitor.    d/w ER team.

## 2020-09-03 NOTE — ED ADULT NURSE NOTE - OBJECTIVE STATEMENT
49y/o male c/o chest pain that began while resting. pt had 5 stents placed last week. pt c/o chest pressure that has subsided. pt aox4, resp even unlabored, +ROM to all extremities. Sinus blake HR 55. will continue to monitor

## 2020-09-03 NOTE — H&P ADULT - NSHPPHYSICALEXAM_GEN_ALL_CORE
General: Well developed male lying in bed not in distress.   HEENT: AT, NC. PERRL. intact EOM. no throat erythema or exudate.   Neck: supple. no JVD.   Chest: CTA bilaterally. no w/r/r.   Heart: S1,S2. RRR. no heart murmur. no edema.  Abdomen: soft. non-tender. non-distended. + BS.   Ext: no calf tenderness. FROM of all ext. distal pulses 2+ b/L.  Neuro: AAO x3. no focal weakness. no speech disorder. all CNs intact. sensory intact.   Skin: no rash noted, no diaphoresis, no pallor.   psych : normal affect. co-operative.

## 2020-09-03 NOTE — H&P ADULT - NSHPLABSRESULTS_GEN_ALL_CORE
ekg sinus blake 59, inf. posterior infarct age undetermined.  cxr reviewed by me no infiltrate or pl. effusion noted.

## 2020-09-03 NOTE — ED ADULT NURSE REASSESSMENT NOTE - NS ED NURSE REASSESS COMMENT FT1
MD aware of labs, repeat EKG done, reviewed by MD. Patient denies any chest pain or SOB. SB on the monitor. Updated on POC. COVID swab sent to lab.

## 2020-09-03 NOTE — ED ADULT TRIAGE NOTE - CHIEF COMPLAINT QUOTE
pt comes to ED c/o chest pressure  starting today. 2 weeks ago pt had 5 stents placed here. per pt "not the same chest pain but feels pressure" pt denies radiation, denies N/V/D denies SOB denies TEE

## 2020-09-03 NOTE — ED PROVIDER NOTE - OBJECTIVE STATEMENT
PT. with hx of HTN and recent MI(multiple stents) 2 weeks ago present to ED c/o "heaviness" to left side of his chest today. Chest pain was at rest. Pt. took 4 baby aspirin prior to coming to the ED. Pt. had no SOB and was not diaphoretic during chest pain. PT. states that his chest pain is much less now.

## 2020-09-03 NOTE — ED PROVIDER NOTE - CLINICAL SUMMARY MEDICAL DECISION MAKING FREE TEXT BOX
PT. with chest heaviness today while at rest. Pt. with recent MI/stents. Will check EKG/labs. Will also get cardiology consult.

## 2020-09-03 NOTE — ED ADULT NURSE REASSESSMENT NOTE - NS ED NURSE REASSESS COMMENT FT1
Assume patient care, A/Ox4. denies any chest pain, SOB. SB on cardia monitor, HR 49-60. Cardiologist at beside and aware. Updated on POC. Pending blood result

## 2020-09-03 NOTE — H&P ADULT - ASSESSMENT
pt. is admitted for     - ACS, pt's first trop 0.07, no active cp at the time of admission, will do serial torp, echo, spoke to cardiologist dr. Spicer who is recommending to start iv heparin, continue asa and brilinta. icu consult appreciated. likely cath in am.     - Bradycardia, pt. has not been on b.blk due to bradycardio, tele monitoring, will send TFT.     - Essential HTN. continue lisinopril.

## 2020-09-03 NOTE — PROGRESS NOTE ADULT - ASSESSMENT
50M w/ PMHx HTN, CAD s/p recent PCI admitted w/ chest pain    NSTEMI  CAD s/p PCI  HTN    Continue DAPT, high dose statin and ACEi. Was not started on BB from prior admission  Will need to trend enzymes q6, repeat EKG in AM and TTE  No ICU needs at this time. If any worsening chest pain or increase in trop please reconsult  Case was d/w Cards and hospitalist on case    Critical Care time: 35 minutes assessing presenting problems of acute illness that poses high probability of life threatening deterioration or end organ damage/dysfunction.  Medical decision making including Initiating plan of care, reviewing data, reviewing radiology, discussing with multidisciplinary team, non inclusive of procedures 50M w/ PMHx HTN, CAD s/p recent PCI admitted w/ chest pain    NSTEMI  CAD s/p PCI  HTN    Continue DAPT, high dose statin and ACEi. Was not started on BB from prior admission. Start heparin gtt   Will need to trend enzymes q6, repeat EKG in AM and TTE  No ICU needs at this time. If any worsening chest pain or increase in trop please reconsult  Case was d/w Cards and hospitalist on case    Critical Care time: 35 minutes assessing presenting problems of acute illness that poses high probability of life threatening deterioration or end organ damage/dysfunction.  Medical decision making including Initiating plan of care, reviewing data, reviewing radiology, discussing with multidisciplinary team, non inclusive of procedures

## 2020-09-04 ENCOUNTER — TRANSCRIPTION ENCOUNTER (OUTPATIENT)
Age: 50
End: 2020-09-04

## 2020-09-04 LAB
APTT BLD: 46.1 SEC — HIGH (ref 27.5–35.5)
APTT BLD: >200 SEC — CRITICAL HIGH (ref 27.5–35.5)
BASOPHILS # BLD AUTO: 0.07 K/UL — SIGNIFICANT CHANGE UP (ref 0–0.2)
BASOPHILS NFR BLD AUTO: 0.8 % — SIGNIFICANT CHANGE UP (ref 0–2)
CK SERPL-CCNC: 70 U/L — SIGNIFICANT CHANGE UP (ref 30–200)
D DIMER BLD IA.RAPID-MCNC: <150 NG/ML DDU — SIGNIFICANT CHANGE UP
EOSINOPHIL # BLD AUTO: 0.34 K/UL — SIGNIFICANT CHANGE UP (ref 0–0.5)
EOSINOPHIL NFR BLD AUTO: 3.8 % — SIGNIFICANT CHANGE UP (ref 0–6)
HCT VFR BLD CALC: 41.6 % — SIGNIFICANT CHANGE UP (ref 39–50)
HCT VFR BLD CALC: 43.3 % — SIGNIFICANT CHANGE UP (ref 39–50)
HGB BLD-MCNC: 14.3 G/DL — SIGNIFICANT CHANGE UP (ref 13–17)
HGB BLD-MCNC: 15 G/DL — SIGNIFICANT CHANGE UP (ref 13–17)
IMM GRANULOCYTES NFR BLD AUTO: 0.5 % — SIGNIFICANT CHANGE UP (ref 0–1.5)
LYMPHOCYTES # BLD AUTO: 2.73 K/UL — SIGNIFICANT CHANGE UP (ref 1–3.3)
LYMPHOCYTES # BLD AUTO: 30.9 % — SIGNIFICANT CHANGE UP (ref 13–44)
MCHC RBC-ENTMCNC: 30.8 PG — SIGNIFICANT CHANGE UP (ref 27–34)
MCHC RBC-ENTMCNC: 31 PG — SIGNIFICANT CHANGE UP (ref 27–34)
MCHC RBC-ENTMCNC: 34.4 GM/DL — SIGNIFICANT CHANGE UP (ref 32–36)
MCHC RBC-ENTMCNC: 34.6 GM/DL — SIGNIFICANT CHANGE UP (ref 32–36)
MCV RBC AUTO: 89.5 FL — SIGNIFICANT CHANGE UP (ref 80–100)
MCV RBC AUTO: 89.7 FL — SIGNIFICANT CHANGE UP (ref 80–100)
MONOCYTES # BLD AUTO: 0.77 K/UL — SIGNIFICANT CHANGE UP (ref 0–0.9)
MONOCYTES NFR BLD AUTO: 8.7 % — SIGNIFICANT CHANGE UP (ref 2–14)
NEUTROPHILS # BLD AUTO: 4.89 K/UL — SIGNIFICANT CHANGE UP (ref 1.8–7.4)
NEUTROPHILS NFR BLD AUTO: 55.3 % — SIGNIFICANT CHANGE UP (ref 43–77)
PLATELET # BLD AUTO: 268 K/UL — SIGNIFICANT CHANGE UP (ref 150–400)
PLATELET # BLD AUTO: 274 K/UL — SIGNIFICANT CHANGE UP (ref 150–400)
RBC # BLD: 4.64 M/UL — SIGNIFICANT CHANGE UP (ref 4.2–5.8)
RBC # BLD: 4.84 M/UL — SIGNIFICANT CHANGE UP (ref 4.2–5.8)
RBC # FLD: 11.6 % — SIGNIFICANT CHANGE UP (ref 10.3–14.5)
RBC # FLD: 11.9 % — SIGNIFICANT CHANGE UP (ref 10.3–14.5)
SARS-COV-2 IGG SERPL QL IA: NEGATIVE — SIGNIFICANT CHANGE UP
SARS-COV-2 IGM SERPL IA-ACNC: <0.1 INDEX — SIGNIFICANT CHANGE UP
SARS-COV-2 RNA SPEC QL NAA+PROBE: SIGNIFICANT CHANGE UP
T3 SERPL-MCNC: 117 NG/DL — SIGNIFICANT CHANGE UP (ref 80–200)
T4 AB SER-ACNC: 7.6 UG/DL — SIGNIFICANT CHANGE UP (ref 4.5–12)
TROPONIN T SERPL-MCNC: 0.07 NG/ML — HIGH (ref 0–0.06)
TROPONIN T SERPL-MCNC: 0.07 NG/ML — HIGH (ref 0–0.06)
TSH SERPL-MCNC: 0.96 UIU/ML — SIGNIFICANT CHANGE UP (ref 0.27–4.2)
WBC # BLD: 7.62 K/UL — SIGNIFICANT CHANGE UP (ref 3.8–10.5)
WBC # BLD: 8.84 K/UL — SIGNIFICANT CHANGE UP (ref 3.8–10.5)
WBC # FLD AUTO: 7.62 K/UL — SIGNIFICANT CHANGE UP (ref 3.8–10.5)
WBC # FLD AUTO: 8.84 K/UL — SIGNIFICANT CHANGE UP (ref 3.8–10.5)

## 2020-09-04 PROCEDURE — 99152 MOD SED SAME PHYS/QHP 5/>YRS: CPT

## 2020-09-04 PROCEDURE — 93454 CORONARY ARTERY ANGIO S&I: CPT | Mod: 26

## 2020-09-04 PROCEDURE — 99232 SBSQ HOSP IP/OBS MODERATE 35: CPT | Mod: 25

## 2020-09-04 PROCEDURE — 99233 SBSQ HOSP IP/OBS HIGH 50: CPT

## 2020-09-04 PROCEDURE — 93571 IV DOP VEL&/PRESS C FLO 1ST: CPT | Mod: 26,LD

## 2020-09-04 RX ORDER — ISOSORBIDE MONONITRATE 60 MG/1
30 TABLET, EXTENDED RELEASE ORAL DAILY
Refills: 0 | Status: DISCONTINUED | OUTPATIENT
Start: 2020-09-04 | End: 2020-09-04

## 2020-09-04 RX ORDER — ISOSORBIDE MONONITRATE 60 MG/1
1 TABLET, EXTENDED RELEASE ORAL
Qty: 90 | Refills: 3
Start: 2020-09-04 | End: 2021-08-29

## 2020-09-04 RX ORDER — ISOSORBIDE MONONITRATE 60 MG/1
30 TABLET, EXTENDED RELEASE ORAL DAILY
Refills: 0 | Status: DISCONTINUED | OUTPATIENT
Start: 2020-09-04 | End: 2020-09-05

## 2020-09-04 RX ADMIN — ATORVASTATIN CALCIUM 80 MILLIGRAM(S): 80 TABLET, FILM COATED ORAL at 21:03

## 2020-09-04 RX ADMIN — HEPARIN SODIUM 1200 UNIT(S)/HR: 5000 INJECTION INTRAVENOUS; SUBCUTANEOUS at 09:42

## 2020-09-04 RX ADMIN — LISINOPRIL 10 MILLIGRAM(S): 2.5 TABLET ORAL at 06:31

## 2020-09-04 RX ADMIN — Medication 81 MILLIGRAM(S): at 11:29

## 2020-09-04 RX ADMIN — HEPARIN SODIUM 5000 UNIT(S): 5000 INJECTION INTRAVENOUS; SUBCUTANEOUS at 00:27

## 2020-09-04 RX ADMIN — TICAGRELOR 90 MILLIGRAM(S): 90 TABLET ORAL at 18:13

## 2020-09-04 RX ADMIN — ISOSORBIDE MONONITRATE 30 MILLIGRAM(S): 60 TABLET, EXTENDED RELEASE ORAL at 18:14

## 2020-09-04 RX ADMIN — HEPARIN SODIUM 1000 UNIT(S)/HR: 5000 INJECTION INTRAVENOUS; SUBCUTANEOUS at 00:27

## 2020-09-04 RX ADMIN — TICAGRELOR 90 MILLIGRAM(S): 90 TABLET ORAL at 06:30

## 2020-09-04 NOTE — DISCHARGE NOTE PROVIDER - NSDCMRMEDTOKEN_GEN_ALL_CORE_FT
aspirin 81 mg oral tablet, chewable: 1 tab(s) orally once a day  atorvastatin 80 mg oral tablet: 1 tab(s) orally once a day (at bedtime)  lisinopril 10 mg oral tablet: 1 tab(s) orally once a day  ticagrelor 90 mg oral tablet: 1 tab(s) orally every 12 hours aspirin 81 mg oral tablet, chewable: 1 tab(s) orally once a day  atorvastatin 80 mg oral tablet: 1 tab(s) orally once a day (at bedtime)  isosorbide mononitrate 30 mg oral tablet, extended release: 1 tab(s) orally once a day  ticagrelor 90 mg oral tablet: 1 tab(s) orally every 12 hours

## 2020-09-04 NOTE — PROGRESS NOTE ADULT - SUBJECTIVE AND OBJECTIVE BOX
[facility]                                                        Office: [address] [address]                                                       Telephone: [phone]. Fax:[phone]                                                                             PROGRESS NOTE   Reason for follow up:                             Overnight: No new events.   Update: Pain free no new events     51 y/o male came to the ER for chest pain.  pt. is a former tobacco user, borderline HTN, HLD, adopted and hence unclear family history, IWSTEMI 8/20/2020 s/p 5 TU in [facility]  initial cath 8/20/2020 initial inferior STEMI (RCA) resulted in dsital RCA TU and mid RCA TU and then followed by a cath on 8/21/2020 Indication Recurrent CP following PCI of the RCA last night with IWSTEMI resulted in mid LAD TU, proximal LAD TU, distal Cx TU, RCA stents were patent) Echo 8/22/2020 LVEF 60-75, Nl RV, ml cLVH. Today pt. started to feel CP at about 2-3 pm  in left side of his chest, felt like continuous pressure, lasted about 2 hrs,  mild 2-3/10 intensity, nonradiated, nonexertional, No sob, The Chest pain is similar to his episodes prior to stenting although milder. Pt called ambulance and came toto ER. When he called 911 he was told to take ASA he states he took ASA and CP was resolved. no cp at the time of admission, Compliant with mediation,  no dizziness, no syncopy, no cough, no fever, no palpitations, no nausea/vomiting, no hematuria, no melena. no abd. pain. In the ER pt. was blake into upper 50's and sometimes goes to upper 40's.      Review of symptoms: Cardiac:  No chest pain. No dyspnea. No palpitations.  Respiratory :no cough. No dyspnea  Gastrointestinal: No diarrhea. No abdominal pain. No bleeding.   Pain in the right wrist secondary to gout flair up post PCI on 8/22 strength 3 of 5     Past medical history: No updates.   Chronic conditions:  Hypertension: controlled. CHF: Stable. CAD: Stable ischemic heart disease. DM: Stable;    BRA 1.8  ASA 2   MAllampati 2     Vitals:  T(C): 36.4 (09-04-20 @ 11:33), Max: 36.9 (09-03-20 @ 19:12)  HR: 56 (09-04-20 @ 11:33) (48 - 64)  BP: 123/72 (09-04-20 @ 11:33) (111/60 - 135/87)  RR: 18 (09-04-20 @ 11:33) (16 - 18)  SpO2: 95% (09-04-20 @ 11:33) (95% - 98%)  Wt(kg): --  I&O's Summary    04 Sep 2020 07:01  -  04 Sep 2020 15:15  --------------------------------------------------------  IN: 0 mL / OUT: 950 mL / NET: -950 mL      Weight (kg): 108 (09-03 @ 22:58)    PHYSICAL EXAM:  Appearance: Comfortable. No acute distress  HEENT:  Head and neck: Atraumatic. Normocephalic.  Normal oral mucosa, PERRL, Neck is supple. No JVD, No carotid bruit.   Neurologic: A & O x 3, no focal deficits. EOMI , Cranial nerves are intact.  Lymphatic: No cervical lymphadenopathy  Cardiovascular: Normal S1 S2, No murmur, rubs/gallops. No JVD, No edema  Respiratory: Lungs clear to auscultation  Gastrointestinal:  Soft, Non-tender, + BS  Lower Extremities: No edema  Psychiatry: Patient is calm. No agitation. Mood & affect appropriate  Skin: No rashes/ ecchymoses/cyanosis/ulcers visualized on the face, hands or feet.  right wrist site benign in appearance strength 3 of 5  which is an improvement from 8/22    CURRENT MEDICATIONS:  lisinopril 10 milliGRAM(s) Oral daily  nitroglycerin     SubLingual 0.4 milliGRAM(s) SubLingual every 5 minutes PRN    atorvastatin  aspirin  chewable  heparin  Infusion.  ticagrelor      LABS:	 	  CARDIAC MARKERS ( 04 Sep 2020 07:32 )  x     / 0.07 ng/mL / x     / x     / x      p-BNP 04 Sep 2020 07:32: x    , CARDIAC MARKERS ( 04 Sep 2020 02:47 )  x     / 0.07 ng/mL / x     / x     / x      p-BNP 04 Sep 2020 02:47: x    , CARDIAC MARKERS ( 03 Sep 2020 20:05 )  x     / 0.07 ng/mL / x     / x     / x      p-BNP 03 Sep 2020 20:05: x                                15.0   7.62  )-----------( 274      ( 04 Sep 2020 07:32 )             43.3     09-03    138  |  100  |  11.0  ----------------------------<  85  4.2   |  27.0  |  0.93    Ca    9.0      03 Sep 2020 20:05    TPro  6.9  /  Alb  3.9  /  TBili  0.2<L>  /  DBili  x   /  AST  19  /  ALT  21  /  AlkPhos  80  09-03    proBNP:   Lipid Profile:   HgA1c: TSH: Thyroid Stimulating Hormone, Serum: 0.96 uIU/mL      TELEMETRY: Reviewed    ECG:  Reviewed by me. 	    DIAGNOSTIC TESTING:    [ ]  Catheterization:   DIAGNOSTIC RECOMMENDATIONS: - ASA 81mg daily indefinitely; Brilinta 90mg  BID for 1 year  - Aggressive medical management and risk factor modification  - Lipitor 80mg qHS  - ACEi, BB if HR will permit (resting sinus bradycardia)  - Outpatient follow up with Dr. Flores at the [facility] in 1   week  INTERVENTIONAL IMPRESSIONS: - Patent RCA stents  - Successful PCI of the distal LCx with a 3.0mm Resolute TU (post-dilated   with a 4.0mm balloon proximally)  - Successful PCI of the proximal LAD with a 4.0mm Resolute TU  - Successful PCI of the mid LAD with a 3.5mm Resoluted TU (post-dilated to   4.0mm)  - Complete stent expansion and apposition confirmed by IVUS imaging  INTERVENTIONAL RECOMMENDATIONS: - ASA 81mg daily indefinitely; Brilinta  90mg BID for 1 year  - Aggressive medical management and risk factor modification  - Lipitor 80mg qHS  - ACEi, BB if HR will permit (resting sinus bradycardia)  - Outpatient follow up with Dr. Flores at the [facility] in 1   week      ASSESSMENT   49 YO male Post STEMI 8/21/2020 TU to RCA follow by continued CP post procedure he was brought back to the cath lab where the LAD was stented as well as LCX and RCA the following day.    On 9/3 he was experiencing CP for 2- 3 hours 2of 10 mid sternal   now presents for LHC      Plan: PRE-PROCEDURE ASSESSMENT    -Patient seen and examined  -Labs reviewed  -Pre-procedure teaching completed with patient   -Questions answered about patients concerns     -instructed to NPO after midnight.   - Pt instructed to have escort to and from procedure   -pt instructed IF STENT PLACED WILL REQUIRE TO STAY OVERNIGHT FOR

## 2020-09-04 NOTE — PROGRESS NOTE ADULT - SUBJECTIVE AND OBJECTIVE BOX
[facility] Division of Hospital Medicine    Chief Complaint:  Patient is a 50y old  Male who presents with a chief complaint of chest pain (03 Sep 2020 23:25)      SUBJECTIVE / OVERNIGHT EVENTS:  Patient was seen and examined at bedside. Patient reports that chest pain has resolved since yesterday.   Patient denies chest pain, SOB, abd pain, N/V, fever, chills, dysuria or any other complaints. All remainder ROS negative.     MEDICATIONS  (STANDING):  aspirin  chewable 81 milliGRAM(s) Oral daily  atorvastatin 80 milliGRAM(s) Oral at bedtime  heparin  Infusion.  Unit(s)/Hr (10 mL/Hr) IV Continuous <Continuous>  lisinopril 10 milliGRAM(s) Oral daily  ticagrelor 90 milliGRAM(s) Oral two times a day    MEDICATIONS  (PRN):  heparin   Injectable 6000 Unit(s) IV Push every 6 hours PRN For aPTT less than 40  nitroglycerin     SubLingual 0.4 milliGRAM(s) SubLingual every 5 minutes PRN Chest Pain        I&O's Summary      PHYSICAL EXAM:  Vital Signs Last 24 Hrs  T(C): 36.4 (04 Sep 2020 11:33), Max: 36.9 (03 Sep 2020 19:12)  T(F): 97.5 (04 Sep 2020 11:33), Max: 98.4 (03 Sep 2020 19:12)  HR: 56 (04 Sep 2020 11:33) (48 - 64)  BP: 123/72 (04 Sep 2020 11:33) (111/60 - 135/87)  BP(mean): --  RR: 18 (04 Sep 2020 11:33) (16 - 18)  SpO2: 95% (04 Sep 2020 11:33) (95% - 98%)      Constitutional: NAD, well-developed, well-groomed  ENT: MMM, no thrush, Supple, No JVD  Lungs: Clear to auscultation bilaterally, good air entry, non-labored breathing  Cardio: RRR, S1/S2, No murmur  Abdomen: Soft, Nontender, Nondistended; Bowel sounds present  Extremities: No calf tenderness, No pitting edema  Musculoskeletal:   No clubbing or cyanosis of digits; no joint swelling or tenderness to palpation  Psych: A+O to person, place, and time; affect appropriate  Neuro: CN 2-12 are intact and symmetric; no gross sensory deficits;   Skin: No rashes; no palpable lesions    LABS:                        15.0   7.62  )-----------( 274      ( 04 Sep 2020 07:32 )             43.3     09-03    138  |  100  |  11.0  ----------------------------<  85  4.2   |  27.0  |  0.93    Ca    9.0      03 Sep 2020 20:05    TPro  6.9  /  Alb  3.9  /  TBili  0.2<L>  /  DBili  x   /  AST  19  /  ALT  21  /  AlkPhos  80  09-03    PT/INR - ( 03 Sep 2020 20:05 )   PT: 12.4 sec;   INR: 1.07 ratio         PTT - ( 04 Sep 2020 07:32 )  PTT:46.1 sec  CARDIAC MARKERS ( 04 Sep 2020 07:32 )  x     / 0.07 ng/mL / x     / x     / x      CARDIAC MARKERS ( 04 Sep 2020 02:47 )  x     / 0.07 ng/mL / x     / x     / x      CARDIAC MARKERS ( 03 Sep 2020 20:05 )  x     / 0.07 ng/mL / x     / x     / x              CAPILLARY BLOOD GLUCOSE            RADIOLOGY REVIEWED

## 2020-09-04 NOTE — PROGRESS NOTE ADULT - ASSESSMENT
49 y/o male with hx of former tobacco user, borderline HTN, HLD, adopted and hence unclear family history, IWSTEMI 8/20/2020 s/p 5 TU in H on (8/20-8/21) presented with complaints of chest pain. Patient was admitted for evaluation for possible ACS and a Cardiac Cath.    Chest pain with elevated troponins  Trop of 0.07  Currently on Heparin drip  Cardiology recs appreciated  Continue with ASA/Brillinta   Atorvastatin 80  Nitroglycerin PRN  Will Hold BB given bradycardia   Appreciated Critical care recs  For Cardiac cath this afternoon  TTE showed normal EF of 60-65%    HTN/HLD  Continue with Lisinopril 10mg  Continue with atorvastatin    Bradycardia  Will monitor    DVT ppx: Currently on Hep drip  Winn catheter: None  Central line: None  Oxygen requirement: Room Air  Activity Level: As tolerated  Advanced Directives: Full Code  Disposition: Pending Cardiac cath and findings. 51 y/o male with hx of former tobacco user, borderline HTN, HLD, adopted and hence unclear family history, IWSTEMI 8/20/2020 s/p 5 TU in H on (8/20-8/21) presented with complaints of chest pain. Patient was admitted for evaluation for possible ACS and a Cardiac Cath.    NSTEMI - Chest pain with elevated troponins  Trop of 0.07  Currently on Heparin drip  Cardiology recs appreciated  Continue with ASA/Brillinta   Atorvastatin 80  Nitroglycerin PRN  Will Hold BB given bradycardia   Appreciated Critical care recs  For Cardiac cath this afternoon  TTE showed normal EF of 60-65%    HTN/HLD  Continue with Lisinopril 10mg  Continue with atorvastatin    Bradycardia  Will monitor    DVT ppx: Currently on Hep drip  Winn catheter: None  Central line: None  Oxygen requirement: Room Air  Activity Level: As tolerated  Advanced Directives: Full Code  Disposition: Pending Cardiac cath and findings.

## 2020-09-04 NOTE — DISCHARGE NOTE PROVIDER - NSDCFUADDINST_GEN_ALL_CORE_FT
No heavy lifting, driving, sex, tub baths, swimming, or any activity that submerges the lower half of the body in water for 48 hours.  Limited walking and stairs for 48 hours.    Change the bandaid after 24 hours and every 24 hours after that.  Keep the puncture site dry and covered with a bandaid until a scab forms.    Observe the site frequently.  If bleeding or a large lump (the size of a golf ball or bigger) occurs lie flat, apply continuous direct pressure just above the puncture site for at least 10 minutes, and notify your physician immediately.  If the bleeding cannot be controlled, call 911 immediately for assistance.  Notify your physician of pain, swelling or any drainage.    Notify your physician immediately if coldness, numbness, discoloration or pain in your foot occurs.    patient not a candidate for cardiac rehab secondary to:  no need for stents

## 2020-09-04 NOTE — DISCHARGE NOTE PROVIDER - NSDCCPCAREPLAN_GEN_ALL_CORE_FT
PRINCIPAL DISCHARGE DIAGNOSIS  Diagnosis: Chest pain  Assessment and Plan of Treatment: Please take medications as requested. Please follow up with your cardiologist outpatient. PRINCIPAL DISCHARGE DIAGNOSIS  Diagnosis: Chest pain  Assessment and Plan of Treatment: s/p left heart catheterization via left groin approach with Dr. Pierre which showed:  CAD wtih Patent Stents and iFR 0.95 of pLAD   Pt started on Imdur ER 30 mg daily   Please take medications as requested. Please follow up with your cardiologist outpatient.

## 2020-09-04 NOTE — PROGRESS NOTE ADULT - SUBJECTIVE AND OBJECTIVE BOX
Department of Cardiology                                                                  [facility] [address] [address] [facility]                                                            Telephone: [phone]. Fax:[phone]                                                                                      Cardiac Cath NP Note       Narrative:    49 y/o male came to the ER for chest pain.  Patient is a former tobacco user, borderline HTN, HLD, adopted and hence unclear family history, NSTEMI 8/20/2020 s/p 5 TU in H  initial cath 8/20/2020 initial inferior STEMI (RCA) resulted in distal RCA TU and mid RCA TU and then followed by a cath on 8/21/2020 Indication Recurrent CP following PCI of the RCA. Echo 8/22/2020 LVEF 60-75, normal RV, mild concentric LVH. Patient presented to ED with c/o chest pain at about 2-3 pm yesterday located at left side chest wall with continuous pressure lasting about 2 hours without radiation, no sob or grimm. Patient describes the pain similar to previous episode that resulted in CAD with PCI x5 stents. Patient is now s/p [facility].        s/p left heart catheterization via left groin approach with Dr. Pierre which showed:  CAD wtih Patent Stents and iFR 0.95 of pLAD   Medications used:   Fentanyl 50 mcg, Versed IV 1 mg, Heparin 8000 units, Nitroglycerin  mcg   Contrast used:  Omnipaque 67 ml  Left Groin access - hemostasis achieved via 6 Croatian AngioSeal Closure          PAST MEDICAL & SURGICAL HISTORY:  HTN (hypertension)  CAD (coronary artery disease)  Gout  H/O cardiac catheterization: x 5 stents  8/20/20     FAMILY HISTORY:  No pertinent family history in first degree relatives    Home Medications:  Asprin 81 mg po daiy  Brilinta 90 mg po twice a day   Atorvastatin 80 mg po daily at hs                     15.0   7.62  )-----------( 274      ( 04 Sep 2020 07:32 )             43.3     09-03    138  |  100  |  11.0  ----------------------------<  85  4.2   |  27.0  |  0.93    Ca    9.0      03 Sep 2020 20:05    TPro  6.9  /  Alb  3.9  /  TBili  0.2<L>  /  DBili  x   /  AST  19  /  ALT  21  /  AlkPhos  80  09-03  PT/INR - ( 03 Sep 2020 20:05 )   PT: 12.4 sec;   INR: 1.07 ratio    PTT - ( 04 Sep 2020 07:32 )  PTT:46.1 sec         General: No fatigue, no fevers/chills  Respiratory: No dyspnea, no cough, no wheeze  CV: No chest pain, no palpitations  Abd: No nausea  Neuro: No headache, no dizziness  No Known Allergies      Objective:  Vital Signs Last 24 Hrs  T(C): 36.7 (04 Sep 2020 14:58), Max: 36.9 (03 Sep 2020 19:12)  T(F): 98.1 (04 Sep 2020 14:58), Max: 98.4 (03 Sep 2020 19:12)  HR: 54 (04 Sep 2020 16:45) (48 - 64)  BP: 117/66 (04 Sep 2020 16:45) (111/60 - 135/87)  RR: 18 (04 Sep 2020 16:45) (16 - 18)  SpO2: 99% (04 Sep 2020 16:45) (95% - 99%)      CM: SR  Neuro: A&OX3, CN 2-12 intact  HEENT: NC, AT  Lungs: CTA B/L  CV: S1, S2, no murmur, RRR  Abd: Soft  Left Groin: Soft, no bleeding, no hematoma; palpable pulses +2   Extremity: + distal pulses    TLEMETRY:  NST    DIAGNOSTICS:     < from: TTE Echo Complete w/ Contrast w/ Doppler (08.22.20 @ 09:22) >  Summary:   1. Technically fair study.   2. Endocardium opacified with echocontrast.   3. Left ventricular ejection fraction, by visual estimation, is 60 to 65%.   4. Normal global left ventricular systolic function.   5. There is mild concentric left ventricular hypertrophy.   6. Normal diastolic function.   7. Normal RV size and function, inadequate estimation of RVSP.   8. There is no evidence of pericardial effusion.    Shira Argueta DO Electronically signed on 8/22/2020 at 4:23:26 PM  *** Final ***      SHIRA PETIT   This document has been electronically signed. Aug 22 2020  9:22AM    < end of copied text >    < from: TTE Echo Limited or F/U (09.03.20 @ 20:10) >  Summary:   1. Technically difficult study with poor endocardial visualization.   2. Endocardial visualization was enhanced with intravenous echo contrast.   3. Normal global left ventricular systolic function.   4. Left ventricular ejection fraction, by visual estimation, is 60 to 65%.   5. No obvious left ventricle regional wall motion abnormalities visualized.   6. The right ventricle is not well visualized, appears moderately dilated with normal systolic function.   7. Normal left atrial size.   8. Normal right atrial size.   9. No aortic stenosis.  10. Recommend clinical correlation with the above findings.    Shi Marroquin MD Electronically signed on 9/4/2020 at 9:41:07 AM    *** Final ***     DANIEL MARROQUIN   This document has been electronically signed. Sep  3 2020  8:10PM    < end of copied text >      < from: Cardiac Cath Lab - Adult (08.21.20 @ 17:32) >  VENTRICLES: No left ventriculogram was performed. EF 50% at time of  intervention for STEMI.  CORONARY VESSELS: The coronary circulation is right dominant.  LM:   --  LM: The LM is cloacal. Angiography showed no evidence of disease.  LAD:   --  Proximal LAD: There was a tubular 70 % stenosis. The lesion was  irregularly contoured, eccentric, and mildly calcified. There was BRIANNA  grade 3 flow through the vessel (brisk flow). An intervention was  performed.  --  Mid LAD: There was a discrete 80 % stenosis. The lesion was irregularly  contoured, eccentric, and moderately calcified. There was BRIANNA grade 3  flow through the vessel (brisk flow). This vicki likely culprit for the  patient's clinical presentation. An intervention was performed.  --  D1: The vessel was small sized. There was a discrete 40 % stenosis. The  lesion was smoothly contoured and eccentric.  CX:   --  Mid circumflex: There was a discrete 30 % stenosis. The lesion  was irregularly contoured and eccentric.  --  Distal circumflex: There was a tubular 100 % stenosis at the origin of  OM2. There was BRIANNA grade 0 flow through the vessel (no flow). This is a  likely culprit for the patient's clinical presentation. An intervention  was performed.  RCA:   --  Mid RCA: There was a discrete 40 % stenosis. The lesion was  smoothly contoured, irregularly contoured, and eccentric. In a second  lesion, there was a 0 % stenosis at the site of a prior stent.  --  Distal RCA: There was a 0 % stenosis at the site of a prior stent.  COMPLICATIONS: No complications occurred during the cath lab visit.  DIAGNOSTIC IMPRESSIONS: - Patent RCA stents  - Successful PCI of the distal LCx with a3.0mm Resolute TU (post-dilated   with a 4.0mm balloon proximally)  - Successful PCI of the proximal LAD with a 4.0mm Resolute TU  - Successful PCI of the mid LAD with a 3.5mm Resoluted TU (post-dilated to   4.0mm)  - Complete stent expansion and apposition confirmed by IVUS imaging  DIAGNOSTIC RECOMMENDATIONS: - ASA 81mg daily indefinitely; Brilinta 90mg  BID for 1 year  - Aggressive medical management and risk factor modification  - Lipitor 80mg qHS  - ACEi, BB if HR will permit (resting sinus bradycardia)  - Outpatient follow up with Dr. Flores at the [facility] in 1   week  INTERVENTIONAL IMPRESSIONS: - Patent RCA stents  - Successful PCI of the distal LCx with a 3.0mm Resolute TU (post-dilated   with a 4.0mm balloon proximally)  - Successful PCI of the proximal LAD with a 4.0mm Resolute TU  - Successful PCI of the mid LAD with a 3.5mm Resoluted TU (post-dilated to   4.0mm)  - Complete stent expansion and apposition confirmed by IVUS imaging  INTERVENTIONAL RECOMMENDATIONS: - ASA 81mg daily indefinitely; Brilinta  90mg BID for 1 year  - Aggressive medical management and risk factor modification  - Lipitor 80mg qHS  - ACEi, BB if HR will permit (resting sinus bradycardia)  - Outpatient follow up with Dr. Flores at the [facility] in 1   week  Prepared and signed by  Brandyn Ngo MD  Signed 08/21/2020 19:35:29    < end of copied text >      AWAITING FINAL REPORT OF TODAY'S CARDIAC CATH 9/4       ASSESSMENT AND PLAN:       49 y/o male came to the ER for chest pain.  Patient is a former tobacco user, borderline HTN, HLD, adopted and hence unclear family history, NSTEMI 8/20/2020 s/p 5 TU in [facility]  initial cath 8/20/2020 initial inferior STEMI (RCA) resulted in distal RCA TU and mid RCA TU and then followed by a cath on 8/21/2020 Indication Recurrent CP following PCI of the RCA. Echo 8/22/2020 LVEF 60-75, normal RV, mild concentric LVH. Patient presented to ED with c/o chest pain at about 2-3 pm yesterday located at left side chest wall with continuous pressure lasting about 2 hours without radiation, no sob or grimm. Patient describes the pain similar to previous episode that resulted in CAD with PCI x5 stents. Patient is now s/p [facility].               -post cardiac cath orders  -Left groin precautions; pt to remain flat for 2 hours with AngioSeal closure  -continue current medical therapy  -pt with iFR 0.95 and evidence of spasms, started on Imdur ER 30 mg with tight parameters   -monitor BP closely   -prev CAD w/PCI x5 stents (8/20/20) continue DAPT with Aspirin and Brilinta  -D/C Heparin gtt   -continue high dose statin - atorvastatin 80 mg daily   -follow up outpt in 1-2 weeks with Dr. Pierre   -to to remain in ONU and d/c in am if stable

## 2020-09-04 NOTE — DISCHARGE NOTE PROVIDER - HOSPITAL COURSE
49 y/o male came to the ER for chest pain.  pt. is a former tobacco user, borderline HTN, HLD, adopted and hence unclear family history, IWSTEMI 8/20/2020 s/p 5 TU in [facility] in 8/20-8/21 presented with chest pain. Patient was found to have an elevated troponin of 0.07. TTE showed normal EF of 60-65%.     Patient underwent Cardiac cath which showed:         Time spent on patients discharge 32 minutes 51 y/o male came to the ER for chest pain.  pt. is a former tobacco user, borderline HTN, HLD, adopted and hence unclear family history, IWSTEMI 8/20/2020 s/p 5 TU in [facility] in 8/20-8/21 presented with chest pain. Patient was found to have an elevated troponin of 0.07. TTE showed normal EF of 60-65%.         Patient underwent Cardiac cath which showed:     CAD wtih Patent Stents and iFR 0.95 of pLAD         Time spent on patients discharge 32 minutes 51 y/o male came to the ER for chest pain.  pt. is a former tobacco user, borderline HTN, HLD, adopted and hence unclear family history, IWSTEMI 8/20/2020 s/p 5 TU in [facility] in 8/20-8/21 presented with chest pain. Patient was found to have an elevated troponin of 0.07. TTE showed normal EF of 60-65%.         Patient underwent Cardiac cath which showed:     CAD wtih Patent Stents and iFR 0.95 of pLAD.  No intervention required.    Imdur added. 51 y/o male came to the ER for chest pain.  pt. is a former tobacco user, borderline HTN, HLD, adopted and hence unclear family history, IWSTEMI 8/20/2020 s/p 5 TU in [facility] in 8/20-8/21 presented with chest pain. Patient was found to have an elevated troponin of 0.07. TTE showed normal EF of 60-65%. Cardiology was consulted.         Patient underwent Cardiac cath which showed:     CAD wtih Patent Stents and iFR 0.95 of pLAD.  No intervention required.    Imdur added. patient discharged home with out patient follow up.         Vital Signs Last 24 Hrs    T(C): 36.6 (05 Sep 2020 07:14), Max: 36.7 (04 Sep 2020 14:58)    T(F): 97.8 (05 Sep 2020 07:14), Max: 98.1 (04 Sep 2020 14:58)    HR: 74 (05 Sep 2020 07:14) (52 - 75)    BP: 114/74 (05 Sep 2020 07:14) (112/65 - 140/82)    BP(mean): 78 (04 Sep 2020 21:00) (78 - 78)    RR: 16 (05 Sep 2020 07:14) (16 - 18)    SpO2: 97% (05 Sep 2020 07:14) (95% - 99%)        PHYSICAL EXAM:    Constitutional: No acute distress, alert and oriented by 3    HEENT: AT/NC, EOMI, PERRLA, Normal conjunctiva, no pharyngeal erythema, moist oral mucosa    Respiratory: CTA BL, equal breath sounds, no crackles or wheezing    Cardiovascular: RRR, no edema    Gastrointestinal: soft, Non-tender, Non-distended + Bowel sounds, no rebound or guarding    Genitourinary: no olson    Musculoskeletal: No joint edema    Neurological: CN 2-12 grossly intact, no focal deficits    Skin: warm, dry and intact    Psychiatric: normal mood and affect        35 minutes spent on discharge

## 2020-09-05 ENCOUNTER — TRANSCRIPTION ENCOUNTER (OUTPATIENT)
Age: 50
End: 2020-09-05

## 2020-09-05 VITALS
HEART RATE: 74 BPM | OXYGEN SATURATION: 97 % | RESPIRATION RATE: 16 BRPM | TEMPERATURE: 98 F | DIASTOLIC BLOOD PRESSURE: 74 MMHG | SYSTOLIC BLOOD PRESSURE: 114 MMHG

## 2020-09-05 LAB
ANION GAP SERPL CALC-SCNC: 12 MMOL/L — SIGNIFICANT CHANGE UP (ref 5–17)
BASOPHILS # BLD AUTO: 0.07 K/UL — SIGNIFICANT CHANGE UP (ref 0–0.2)
BASOPHILS NFR BLD AUTO: 0.9 % — SIGNIFICANT CHANGE UP (ref 0–2)
BUN SERPL-MCNC: 14 MG/DL — SIGNIFICANT CHANGE UP (ref 8–20)
CALCIUM SERPL-MCNC: 9.1 MG/DL — SIGNIFICANT CHANGE UP (ref 8.6–10.2)
CHLORIDE SERPL-SCNC: 102 MMOL/L — SIGNIFICANT CHANGE UP (ref 98–107)
CK SERPL-CCNC: 66 U/L — SIGNIFICANT CHANGE UP (ref 30–200)
CO2 SERPL-SCNC: 24 MMOL/L — SIGNIFICANT CHANGE UP (ref 22–29)
CREAT SERPL-MCNC: 0.88 MG/DL — SIGNIFICANT CHANGE UP (ref 0.5–1.3)
EOSINOPHIL # BLD AUTO: 0.12 K/UL — SIGNIFICANT CHANGE UP (ref 0–0.5)
EOSINOPHIL NFR BLD AUTO: 1.6 % — SIGNIFICANT CHANGE UP (ref 0–6)
GLUCOSE SERPL-MCNC: 115 MG/DL — HIGH (ref 70–99)
HCT VFR BLD CALC: 42.7 % — SIGNIFICANT CHANGE UP (ref 39–50)
HGB BLD-MCNC: 14.4 G/DL — SIGNIFICANT CHANGE UP (ref 13–17)
IMM GRANULOCYTES NFR BLD AUTO: 0.4 % — SIGNIFICANT CHANGE UP (ref 0–1.5)
LYMPHOCYTES # BLD AUTO: 1.51 K/UL — SIGNIFICANT CHANGE UP (ref 1–3.3)
LYMPHOCYTES # BLD AUTO: 20.5 % — SIGNIFICANT CHANGE UP (ref 13–44)
MAGNESIUM SERPL-MCNC: 2 MG/DL — SIGNIFICANT CHANGE UP (ref 1.6–2.6)
MCHC RBC-ENTMCNC: 30.1 PG — SIGNIFICANT CHANGE UP (ref 27–34)
MCHC RBC-ENTMCNC: 33.7 GM/DL — SIGNIFICANT CHANGE UP (ref 32–36)
MCV RBC AUTO: 89.3 FL — SIGNIFICANT CHANGE UP (ref 80–100)
MONOCYTES # BLD AUTO: 0.64 K/UL — SIGNIFICANT CHANGE UP (ref 0–0.9)
MONOCYTES NFR BLD AUTO: 8.7 % — SIGNIFICANT CHANGE UP (ref 2–14)
NEUTROPHILS # BLD AUTO: 5 K/UL — SIGNIFICANT CHANGE UP (ref 1.8–7.4)
NEUTROPHILS NFR BLD AUTO: 67.9 % — SIGNIFICANT CHANGE UP (ref 43–77)
PLATELET # BLD AUTO: 276 K/UL — SIGNIFICANT CHANGE UP (ref 150–400)
POTASSIUM SERPL-MCNC: 4.2 MMOL/L — SIGNIFICANT CHANGE UP (ref 3.5–5.3)
POTASSIUM SERPL-SCNC: 4.2 MMOL/L — SIGNIFICANT CHANGE UP (ref 3.5–5.3)
RBC # BLD: 4.78 M/UL — SIGNIFICANT CHANGE UP (ref 4.2–5.8)
RBC # FLD: 11.7 % — SIGNIFICANT CHANGE UP (ref 10.3–14.5)
SODIUM SERPL-SCNC: 138 MMOL/L — SIGNIFICANT CHANGE UP (ref 135–145)
WBC # BLD: 7.37 K/UL — SIGNIFICANT CHANGE UP (ref 3.8–10.5)
WBC # FLD AUTO: 7.37 K/UL — SIGNIFICANT CHANGE UP (ref 3.8–10.5)

## 2020-09-05 PROCEDURE — C1894: CPT

## 2020-09-05 PROCEDURE — 87635 SARS-COV-2 COVID-19 AMP PRB: CPT

## 2020-09-05 PROCEDURE — C1760: CPT

## 2020-09-05 PROCEDURE — C1887: CPT

## 2020-09-05 PROCEDURE — 84484 ASSAY OF TROPONIN QUANT: CPT

## 2020-09-05 PROCEDURE — C1769: CPT

## 2020-09-05 PROCEDURE — 93454 CORONARY ARTERY ANGIO S&I: CPT

## 2020-09-05 PROCEDURE — 85730 THROMBOPLASTIN TIME PARTIAL: CPT

## 2020-09-05 PROCEDURE — 80053 COMPREHEN METABOLIC PANEL: CPT

## 2020-09-05 PROCEDURE — 93308 TTE F-UP OR LMTD: CPT

## 2020-09-05 PROCEDURE — 84436 ASSAY OF TOTAL THYROXINE: CPT

## 2020-09-05 PROCEDURE — 80048 BASIC METABOLIC PNL TOTAL CA: CPT

## 2020-09-05 PROCEDURE — 83735 ASSAY OF MAGNESIUM: CPT

## 2020-09-05 PROCEDURE — 84480 ASSAY TRIIODOTHYRONINE (T3): CPT

## 2020-09-05 PROCEDURE — 99239 HOSP IP/OBS DSCHRG MGMT >30: CPT

## 2020-09-05 PROCEDURE — 86769 SARS-COV-2 COVID-19 ANTIBODY: CPT

## 2020-09-05 PROCEDURE — 82550 ASSAY OF CK (CPK): CPT

## 2020-09-05 PROCEDURE — 36415 COLL VENOUS BLD VENIPUNCTURE: CPT

## 2020-09-05 PROCEDURE — 99152 MOD SED SAME PHYS/QHP 5/>YRS: CPT

## 2020-09-05 PROCEDURE — 93005 ELECTROCARDIOGRAM TRACING: CPT

## 2020-09-05 PROCEDURE — 85379 FIBRIN DEGRADATION QUANT: CPT

## 2020-09-05 PROCEDURE — 84443 ASSAY THYROID STIM HORMONE: CPT

## 2020-09-05 PROCEDURE — 85027 COMPLETE CBC AUTOMATED: CPT

## 2020-09-05 PROCEDURE — 99153 MOD SED SAME PHYS/QHP EA: CPT

## 2020-09-05 PROCEDURE — 93571 IV DOP VEL&/PRESS C FLO 1ST: CPT | Mod: LD

## 2020-09-05 PROCEDURE — 85610 PROTHROMBIN TIME: CPT

## 2020-09-05 PROCEDURE — 71045 X-RAY EXAM CHEST 1 VIEW: CPT

## 2020-09-05 PROCEDURE — 99285 EMERGENCY DEPT VISIT HI MDM: CPT

## 2020-09-05 RX ORDER — ATORVASTATIN CALCIUM 80 MG/1
1 TABLET, FILM COATED ORAL
Qty: 90 | Refills: 3
Start: 2020-09-05 | End: 2021-08-30

## 2020-09-05 RX ORDER — ISOSORBIDE MONONITRATE 60 MG/1
1 TABLET, EXTENDED RELEASE ORAL
Qty: 90 | Refills: 3
Start: 2020-09-05 | End: 2021-08-30

## 2020-09-05 RX ORDER — TICAGRELOR 90 MG/1
1 TABLET ORAL
Qty: 180 | Refills: 3
Start: 2020-09-05 | End: 2021-08-30

## 2020-09-05 RX ADMIN — TICAGRELOR 90 MILLIGRAM(S): 90 TABLET ORAL at 06:42

## 2020-09-05 RX ADMIN — ISOSORBIDE MONONITRATE 30 MILLIGRAM(S): 60 TABLET, EXTENDED RELEASE ORAL at 08:59

## 2020-09-05 RX ADMIN — Medication 81 MILLIGRAM(S): at 08:59

## 2020-09-05 NOTE — PROGRESS NOTE ADULT - SUBJECTIVE AND OBJECTIVE BOX
Nurse Practitioner Progress note:     INTERVAL HISTORY: 51 y/o male came to the ER for chest pain.  Patient is a former tobacco user, borderline HTN, HLD, adopted and hence unclear family history, NSTEMI 8/20/2020 s/p 5 TU in H  initial cath 8/20/2020 initial inferior STEMI (RCA) resulted in distal RCA TU and mid RCA TU and then followed by a cath on 8/21/2020 Indication Recurrent CP following PCI of the RCA. Echo 8/22/2020 LVEF 60-75, normal RV, mild concentric LVH. Patient presented to ED with c/o chest pain at about 2-3 pm yesterday located at left side chest wall with continuous pressure lasting about 2 hours without radiation, no sob or grimm. Patient describes the pain similar to previous episode that resulted in CAD with PCI x5 stents. Patient is now s/p [facility].      MEDICATIONS:  isosorbide   mononitrate ER Tablet (IMDUR) 30 milliGRAM(s) Oral daily  nitroglycerin     SubLingual 0.4 milliGRAM(s) SubLingual every 5 minutes PRN  atorvastatin 80 milliGRAM(s) Oral at bedtime  aspirin  chewable 81 milliGRAM(s) Oral daily  ticagrelor 90 milliGRAM(s) Oral two times a day      TELEMETRY: SB    T(C): 36.7 (09-04-20 @ 14:58), Max: 36.7 (09-04-20 @ 14:58)  HR: 59 (09-05-20 @ 06:45) (52 - 75)  BP: 113/63 (09-05-20 @ 06:45) (112/65 - 140/82)  RR: 18 (09-04-20 @ 19:06) (18 - 18)  SpO2: 97% (09-04-20 @ 19:06) (95% - 99%)  Wt(kg): --    PHYSICAL EXAM:  Appearance: Normal	  Cardiovascular: Normal S1 S2, No JVD, No murmurs, No edema  Respiratory: Lungs clear to auscultation	  Psychiatry: A & O x 3, Mood & affect appropriate  Gastrointestinal:  Soft, Non-tender, + BS	  Neurologic: Non-focal, A&O X3.  No neuro deficits  Extremities: Normal range of motion, No clubbing, cyanosis or edema  Vascular: Peripheral pulses palpable 2+ bilaterally  Procedure Site: Right groin dressing removed.  Site benign.  No bleeding/hematoma/ecchymosis.  + palp pedal pulse.  Left groin site from prior cath benign.    12 lead EKG:  	SB 43 bpm.      LABS:	 	               14.4   7.37  )-----------( 276      ( 05 Sep 2020 06:21 )             42.7     09-05    138  |  102  |  14.0  ----------------------------<  115<H>  4.2   |  24.0  |  0.88    Ca    9.1      05 Sep 2020 06:21  Mg     2.0     09-05    TPro  6.9  /  Alb  3.9  /  TBili  0.2<L>  /  DBili  x   /  AST  19  /  ALT  21  /  AlkPhos  80  09-03      PROCEDURE RESULTS: s/p left heart catheterization via left groin approach with Dr. Pierre which showed:  CAD wtih Patent Stents and iFR 0.95 of pLAD     ASSESSMENT/PLAN: 	  -Groin precautions reviewed  -Resume home meds  -D/C lisinipril  -Initiate imdur  -Follow up with Dr. Pierre  -Cleared for discharge from cardiac standpoint Nurse Practitioner Progress note:     INTERVAL HISTORY: 49 y/o male came to the ER for chest pain.  Patient is a former tobacco user, borderline HTN, HLD, adopted and hence unclear family history, NSTEMI 8/20/2020 s/p 5 TU in H  initial cath 8/20/2020 initial inferior STEMI (RCA) resulted in distal RCA TU and mid RCA TU and then followed by a cath on 8/21/2020 Indication Recurrent CP following PCI of the RCA. Echo 8/22/2020 LVEF 60-75, normal RV, mild concentric LVH. Patient presented to ED with c/o chest pain at about 2-3 pm yesterday located at left side chest wall with continuous pressure lasting about 2 hours without radiation, no sob or grimm. Patient describes the pain similar to previous episode that resulted in CAD with PCI x5 stents. Patient is now s/p [facility].      MEDICATIONS:  isosorbide   mononitrate ER Tablet (IMDUR) 30 milliGRAM(s) Oral daily  nitroglycerin     SubLingual 0.4 milliGRAM(s) SubLingual every 5 minutes PRN  atorvastatin 80 milliGRAM(s) Oral at bedtime  aspirin  chewable 81 milliGRAM(s) Oral daily  ticagrelor 90 milliGRAM(s) Oral two times a day      TELEMETRY: SB    T(C): 36.7 (09-04-20 @ 14:58), Max: 36.7 (09-04-20 @ 14:58)  HR: 59 (09-05-20 @ 06:45) (52 - 75)  BP: 113/63 (09-05-20 @ 06:45) (112/65 - 140/82)  RR: 18 (09-04-20 @ 19:06) (18 - 18)  SpO2: 97% (09-04-20 @ 19:06) (95% - 99%)  Wt(kg): --    PHYSICAL EXAM:  Appearance: Normal	  Cardiovascular: Normal S1 S2, No JVD, No murmurs, No edema  Respiratory: Lungs clear to auscultation	  Psychiatry: A & O x 3, Mood & affect appropriate  Gastrointestinal:  Soft, Non-tender, + BS	  Neurologic: Non-focal, A&O X3.  No neuro deficits  Extremities: Normal range of motion, No clubbing, cyanosis or edema  Vascular: Peripheral pulses palpable 2+ bilaterally  Procedure Site: Right groin dressing removed.  Site benign.  No bleeding/hematoma/ecchymosis.  + palp pedal pulse.  Left groin site from prior cath benign.    12 lead EKG:  	SB 43 bpm.      LABS:	 	               14.4   7.37  )-----------( 276      ( 05 Sep 2020 06:21 )             42.7     09-05    138  |  102  |  14.0  ----------------------------<  115<H>  4.2   |  24.0  |  0.88    Ca    9.1      05 Sep 2020 06:21  Mg     2.0     09-05    TPro  6.9  /  Alb  3.9  /  TBili  0.2<L>  /  DBili  x   /  AST  19  /  ALT  21  /  AlkPhos  80  09-03      PROCEDURE RESULTS: s/p left heart catheterization via left groin approach with Dr. Pierre which showed:  CAD wtih Patent Stents and iFR 0.95 of pLAD     ASSESSMENT/PLAN: 	  -Groin precautions reviewed  -Resume home meds  -D/C lisinipril  -Not on BB d/t bradycardia  -Initiate imdur  -Follow up with Dr. Pierre  -Cleared for discharge from cardiac standpoint

## 2020-09-05 NOTE — DISCHARGE NOTE NURSING/CASE MANAGEMENT/SOCIAL WORK - PATIENT PORTAL LINK FT
You can access the FollowMyHealth Patient Portal offered by [facility] by registering at the following website: [email or web address]. By joining [facility]’s FollowMyHealth portal, you will also be able to view your health information using other applications (apps) compatible with our system.

## 2020-09-08 NOTE — HISTORY OF PRESENT ILLNESS
[FreeTextEntry1] : Patient with history of borderline hypertension, adopted and hence unclear family history. tobacco chewing. quit 1 year prior. 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 RCA, circumflex and LAD. \par Gout- right arm since discharge from hospital. \par Diarrhea. \par Nausea. after leaving from hospital. \par

## 2020-09-08 NOTE — DISCUSSION/SUMMARY
[FreeTextEntry1] : LDL- 111. \par Repeat TTE in 2 months. \par Weight loss. \par Nutritionist. \par \par

## 2020-09-08 NOTE — PHYSICAL EXAM
[General Appearance - Well Developed] : well developed [Normal Conjunctiva] : the conjunctiva exhibited no abnormalities [Normal Oropharynx] : normal oropharynx [Normal Jugular Venous V Waves Present] : normal jugular venous V waves present [Heart Rate And Rhythm] : heart rate and rhythm were normal [] : no respiratory distress [Bowel Sounds] : normal bowel sounds [Abnormal Walk] : normal gait [Nail Clubbing] : no clubbing of the fingernails [Oriented To Time, Place, And Person] : oriented to person, place, and time [Skin Color & Pigmentation] : normal skin color and pigmentation

## 2020-09-22 RX ORDER — LISINOPRIL 10 MG/1
10 TABLET ORAL DAILY
Qty: 90 | Refills: 1 | Status: COMPLETED | COMMUNITY
Start: 2020-08-27 | End: 2020-09-22

## 2020-09-23 ENCOUNTER — APPOINTMENT (OUTPATIENT)
Dept: CARDIOLOGY | Facility: CLINIC | Age: 50
End: 2020-09-23
Payer: COMMERCIAL

## 2020-09-23 VITALS
SYSTOLIC BLOOD PRESSURE: 110 MMHG | HEART RATE: 91 BPM | OXYGEN SATURATION: 95 % | BODY MASS INDEX: 31.28 KG/M2 | HEIGHT: 73 IN | DIASTOLIC BLOOD PRESSURE: 70 MMHG | WEIGHT: 236 LBS | TEMPERATURE: 98.4 F

## 2020-09-23 PROCEDURE — 36415 COLL VENOUS BLD VENIPUNCTURE: CPT

## 2020-09-23 PROCEDURE — 85730 THROMBOPLASTIN TIME PARTIAL: CPT

## 2020-09-23 PROCEDURE — 93000 ELECTROCARDIOGRAM COMPLETE: CPT

## 2020-09-23 PROCEDURE — 80048 BASIC METABOLIC PNL TOTAL CA: CPT

## 2020-09-23 PROCEDURE — 99214 OFFICE O/P EST MOD 30 MIN: CPT

## 2020-09-23 PROCEDURE — 84100 ASSAY OF PHOSPHORUS: CPT

## 2020-09-23 PROCEDURE — C9606: CPT | Mod: RC

## 2020-09-23 PROCEDURE — 83735 ASSAY OF MAGNESIUM: CPT

## 2020-09-23 PROCEDURE — C1725: CPT

## 2020-09-23 PROCEDURE — 85027 COMPLETE CBC AUTOMATED: CPT

## 2020-09-23 PROCEDURE — 99152 MOD SED SAME PHYS/QHP 5/>YRS: CPT

## 2020-09-23 PROCEDURE — C8929: CPT

## 2020-09-23 PROCEDURE — C1769: CPT

## 2020-09-23 PROCEDURE — C1894: CPT

## 2020-09-23 PROCEDURE — 99283 EMERGENCY DEPT VISIT LOW MDM: CPT

## 2020-09-23 PROCEDURE — 71045 X-RAY EXAM CHEST 1 VIEW: CPT

## 2020-09-23 PROCEDURE — C1753: CPT

## 2020-09-23 PROCEDURE — 92978 ENDOLUMINL IVUS OCT C 1ST: CPT | Mod: LD

## 2020-09-23 PROCEDURE — 80061 LIPID PANEL: CPT

## 2020-09-23 PROCEDURE — C1887: CPT

## 2020-09-23 PROCEDURE — 86769 SARS-COV-2 COVID-19 ANTIBODY: CPT

## 2020-09-23 PROCEDURE — 87635 SARS-COV-2 COVID-19 AMP PRB: CPT

## 2020-09-23 PROCEDURE — C9600: CPT | Mod: LD

## 2020-09-23 PROCEDURE — 85610 PROTHROMBIN TIME: CPT

## 2020-09-23 PROCEDURE — 80053 COMPREHEN METABOLIC PANEL: CPT

## 2020-09-23 PROCEDURE — 93005 ELECTROCARDIOGRAM TRACING: CPT

## 2020-09-23 PROCEDURE — 93458 L HRT ARTERY/VENTRICLE ANGIO: CPT | Mod: 59

## 2020-09-23 PROCEDURE — 84484 ASSAY OF TROPONIN QUANT: CPT

## 2020-09-23 PROCEDURE — 83036 HEMOGLOBIN GLYCOSYLATED A1C: CPT

## 2020-09-23 PROCEDURE — C1874: CPT

## 2020-10-02 ENCOUNTER — NON-APPOINTMENT (OUTPATIENT)
Age: 50
End: 2020-10-02

## 2020-11-02 ENCOUNTER — NON-APPOINTMENT (OUTPATIENT)
Age: 50
End: 2020-11-02

## 2020-11-05 ENCOUNTER — APPOINTMENT (OUTPATIENT)
Dept: CARDIOLOGY | Facility: CLINIC | Age: 50
End: 2020-11-05
Payer: COMMERCIAL

## 2020-11-05 VITALS
HEART RATE: 62 BPM | SYSTOLIC BLOOD PRESSURE: 110 MMHG | TEMPERATURE: 97.3 F | DIASTOLIC BLOOD PRESSURE: 70 MMHG | BODY MASS INDEX: 32.32 KG/M2 | WEIGHT: 245 LBS | OXYGEN SATURATION: 95 %

## 2020-11-05 DIAGNOSIS — R06.00 DYSPNEA, UNSPECIFIED: ICD-10-CM

## 2020-11-05 DIAGNOSIS — I21.3 ST ELEVATION (STEMI) MYOCARDIAL INFARCTION OF UNSPECIFIED SITE: ICD-10-CM

## 2020-11-05 PROCEDURE — 93000 ELECTROCARDIOGRAM COMPLETE: CPT

## 2020-11-05 PROCEDURE — 99072 ADDL SUPL MATRL&STAF TM PHE: CPT

## 2020-11-05 PROCEDURE — 99214 OFFICE O/P EST MOD 30 MIN: CPT

## 2020-11-05 RX ORDER — TRAMADOL HYDROCHLORIDE 50 MG/1
50 TABLET, COATED ORAL
Qty: 40 | Refills: 0 | Status: DISCONTINUED | COMMUNITY
End: 2020-11-05

## 2020-11-05 RX ORDER — CEPHALEXIN 500 MG/1
500 CAPSULE ORAL 3 TIMES DAILY
Qty: 21 | Refills: 0 | Status: DISCONTINUED | COMMUNITY
End: 2020-11-05

## 2020-11-05 RX ORDER — PREDNISONE 10 MG/1
10 TABLET ORAL
Refills: 0 | Status: DISCONTINUED | COMMUNITY
Start: 2020-08-27 | End: 2020-11-05

## 2020-11-05 RX ORDER — TICAGRELOR 90 MG/1
90 TABLET ORAL TWICE DAILY
Qty: 180 | Refills: 1 | Status: DISCONTINUED | COMMUNITY
Start: 2020-08-27 | End: 2020-11-05

## 2020-11-05 RX ORDER — COLCHICINE 0.6 MG/1
0.6 TABLET ORAL
Refills: 0 | Status: DISCONTINUED | COMMUNITY
End: 2020-11-05

## 2020-11-06 ENCOUNTER — NON-APPOINTMENT (OUTPATIENT)
Age: 50
End: 2020-11-06

## 2020-11-06 LAB
CHOLEST SERPL-MCNC: 123 MG/DL
DEPRECATED D DIMER PPP IA-ACNC: <150 NG/ML DDU
HDLC SERPL-MCNC: 45 MG/DL
LDLC SERPL CALC-MCNC: 57 MG/DL
NONHDLC SERPL-MCNC: 78 MG/DL
NT-PROBNP SERPL-MCNC: 121 PG/ML
TRIGL SERPL-MCNC: 104 MG/DL

## 2020-11-17 ENCOUNTER — TRANSCRIPTION ENCOUNTER (OUTPATIENT)
Age: 50
End: 2020-11-17

## 2020-11-19 ENCOUNTER — APPOINTMENT (OUTPATIENT)
Dept: CARDIOLOGY | Facility: CLINIC | Age: 50
End: 2020-11-19

## 2020-11-19 RX ORDER — KRILL/OM-3/DHA/EPA/PHOSPHO/AST 1000-230MG
81 CAPSULE ORAL
Qty: 90 | Refills: 3 | Status: ACTIVE | COMMUNITY
Start: 2020-08-27 | End: 1900-01-01

## 2020-11-23 ENCOUNTER — APPOINTMENT (OUTPATIENT)
Dept: RHEUMATOLOGY | Facility: CLINIC | Age: 50
End: 2020-11-23
Payer: COMMERCIAL

## 2020-11-23 VITALS
HEIGHT: 73 IN | WEIGHT: 250 LBS | SYSTOLIC BLOOD PRESSURE: 125 MMHG | DIASTOLIC BLOOD PRESSURE: 75 MMHG | HEART RATE: 53 BPM | TEMPERATURE: 98.6 F | BODY MASS INDEX: 33.13 KG/M2 | RESPIRATION RATE: 16 BRPM | OXYGEN SATURATION: 96 %

## 2020-11-23 PROCEDURE — 99205 OFFICE O/P NEW HI 60 MIN: CPT | Mod: GC

## 2020-11-23 NOTE — END OF VISIT
[] : Fellow [FreeTextEntry3] : I performed a history and physical exam of the patient and discussed his management with the fellow. I reviewed the fellow’s note and agree with the documented findings and plan of care.  50 year old male with gout (confirmed via arthrocentesis previously) presents to establish care.  As pt experiences 2+ flares per year, uric acid lowering therapy is indicated - recommended allopurinol, but pt declined.  R/B/A discussed.  Also discussed gout dietary recommendations and losing weight.  Prescribed colchicine prn in case of flare, and advised pt to call if he experiences another flare.

## 2020-11-23 NOTE — ASSESSMENT
[FreeTextEntry1] : 49yo man with PMHx of CAD, MI and gout presented to the rheumatology office to establish care.\par \par \par Impression:\par Gout with more than 2 episodes a year. hyperuricemia on last uric acid measurement \par \par \par \par Recommendations:\par -treatment recommendations were discussed and the patient declined for the moment, to start uric acid lowering therapy. he prefers to continue with a strict diet plan, weight loss and PRN use of colchicine to treat early signs of gout attacks.\par -Colchicine 1.2 mg once followed by 0.6 mg once if early signs of an acute gout develops\par -labs: cbc, bmp, uric acid levels\par -follow up in 2 -3 months \par -will discuss ULT on next visit. to start Allopurinol if patient agrees\par \par D/w Dr. Saxena \par \par \par \par

## 2020-11-23 NOTE — HISTORY OF PRESENT ILLNESS
[Arthralgias] : arthralgias [Joint Swelling] : joint swelling [FreeTextEntry1] : 49yo M who presented to the clinic for evaluation of Gout.\par \par the patient reported that he started to have gout attacks 5 years ago. initially started as podagra but later developed flares involving the ankles, knees and wrists. he had a hospitalization where he had an arthrocentesis and MSOU crystals where reported to be present on fluid analysis. his episodes of gout attacks are more common now, having flares every 3-4 months. he has had treatment with colchicine, steorids and NSAIDS. reported to never been on uric acid lowering therapy before.  [Anorexia] : no anorexia [Weight Loss] : no weight loss [Malaise] : no malaise [Fever] : no fever [Chills] : no chills [Fatigue] : no fatigue [Malar Facial Rash] : no malar facial rash [Skin Lesions] : no lesions [Skin Nodules] : no skin nodules [Oral Ulcers] : no oral ulcers [Cough] : no cough [Dry Mouth] : no dry mouth [Dysphonia] : no dysphonia [Dysphagia] : no dysphagia [Shortness of Breath] : no shortness of breath [Chest Pain] : no chest pain [Joint Warmth] : no joint warmth [Joint Deformity] : no joint deformity [Decreased ROM] : no decreased range of motion [Morning Stiffness] : no morning stiffness [Falls] : no falls [Dyspnea] : no dyspnea [Myalgias] : no myalgias [Muscle Weakness] : no muscle weakness [Muscle Spasms] : no muscle spasms [Muscle Cramping] : no muscle cramping [Visual Changes] : no visual changes [Eye Pain] : no eye pain [Eye Redness] : no eye redness [Dry Eyes] : no dry eyes

## 2020-11-23 NOTE — PHYSICAL EXAM
[General Appearance - Alert] : alert [General Appearance - In No Acute Distress] : in no acute distress [Sclera] : the sclera and conjunctiva were normal [Neck Appearance] : the appearance of the neck was normal [Auscultation Breath Sounds / Voice Sounds] : lungs were clear to auscultation bilaterally [Heart Rate And Rhythm] : heart rate was normal and rhythm regular [Heart Sounds] : normal S1 and S2 [Abdomen Soft] : soft [Cervical Lymph Nodes Enlarged Posterior Bilaterally] : posterior cervical [Cervical Lymph Nodes Enlarged Anterior Bilaterally] : anterior cervical [Supraclavicular Lymph Nodes Enlarged Bilaterally] : supraclavicular [No CVA Tenderness] : no ~M costovertebral angle tenderness [Abnormal Walk] : normal gait [Nail Clubbing] : no clubbing  or cyanosis of the fingernails [Musculoskeletal - Swelling] : no joint swelling seen [No Focal Deficits] : no focal deficits [Oriented To Time, Place, And Person] : oriented to person, place, and time [Outer Ear] : the ears and nose were normal in appearance [Oropharynx] : the oropharynx was normal [Neck Cervical Mass (___cm)] : no neck mass was observed [Jugular Venous Distention Increased] : there was no jugular-venous distention [Thyroid Diffuse Enlargement] : the thyroid was not enlarged [Thyroid Nodule] : there were no palpable thyroid nodules [Heart Sounds Gallop] : no gallops [Murmurs] : no murmurs [Heart Sounds Pericardial Friction Rub] : no pericardial rub [Edema] : there was no peripheral edema [Bowel Sounds] : normal bowel sounds [Abdomen Tenderness] : non-tender [] : no hepato-splenomegaly [Abdomen Mass (___ Cm)] : no abdominal mass palpated [No Spinal Tenderness] : no spinal tenderness [Motor Tone] : muscle strength and tone were normal [Skin Color & Pigmentation] : normal skin color and pigmentation [Skin Turgor] : normal skin turgor [Impaired Insight] : insight and judgment were intact [Affect] : the affect was normal [FreeTextEntry1] : No synovitis, full ROM in all joints\par

## 2020-11-30 ENCOUNTER — RX CHANGE (OUTPATIENT)
Age: 50
End: 2020-11-30

## 2020-12-10 ENCOUNTER — APPOINTMENT (OUTPATIENT)
Dept: CARDIOLOGY | Facility: CLINIC | Age: 50
End: 2020-12-10
Payer: COMMERCIAL

## 2020-12-10 VITALS
TEMPERATURE: 98.7 F | BODY MASS INDEX: 32.32 KG/M2 | OXYGEN SATURATION: 99 % | SYSTOLIC BLOOD PRESSURE: 124 MMHG | HEART RATE: 51 BPM | DIASTOLIC BLOOD PRESSURE: 80 MMHG | WEIGHT: 245 LBS

## 2020-12-10 DIAGNOSIS — R00.1 BRADYCARDIA, UNSPECIFIED: ICD-10-CM

## 2020-12-10 PROCEDURE — 99072 ADDL SUPL MATRL&STAF TM PHE: CPT

## 2020-12-10 PROCEDURE — 93000 ELECTROCARDIOGRAM COMPLETE: CPT

## 2020-12-10 PROCEDURE — 99214 OFFICE O/P EST MOD 30 MIN: CPT

## 2021-02-03 RX ORDER — ATORVASTATIN CALCIUM 80 MG/1
80 TABLET, FILM COATED ORAL
Qty: 90 | Refills: 3 | Status: ACTIVE | COMMUNITY
Start: 2020-08-27 | End: 1900-01-01

## 2021-03-11 ENCOUNTER — APPOINTMENT (OUTPATIENT)
Dept: CARDIOLOGY | Facility: CLINIC | Age: 51
End: 2021-03-11

## 2021-03-17 ENCOUNTER — RX RENEWAL (OUTPATIENT)
Age: 51
End: 2021-03-17

## 2021-03-22 ENCOUNTER — APPOINTMENT (OUTPATIENT)
Dept: RHEUMATOLOGY | Facility: CLINIC | Age: 51
End: 2021-03-22
Payer: COMMERCIAL

## 2021-03-22 VITALS
HEART RATE: 53 BPM | DIASTOLIC BLOOD PRESSURE: 81 MMHG | WEIGHT: 250 LBS | TEMPERATURE: 97.9 F | SYSTOLIC BLOOD PRESSURE: 127 MMHG | BODY MASS INDEX: 33.13 KG/M2 | OXYGEN SATURATION: 97 % | HEIGHT: 73 IN

## 2021-03-22 PROCEDURE — 99072 ADDL SUPL MATRL&STAF TM PHE: CPT

## 2021-03-22 PROCEDURE — 99214 OFFICE O/P EST MOD 30 MIN: CPT | Mod: GC

## 2021-03-22 NOTE — END OF VISIT
[] : Fellow [FreeTextEntry3] : I performed a history and physical exam of the patient and discussed his management with the fellow. I reviewed the fellow’s note and agree with the documented findings and plan of care.  50 year old male with gout.  Allopurinol indicated as pt w/ frequent flares, but he declines it.  R/B/A discussed.  Discussed gout dietary recommendations.  Cont colchicine prn.

## 2021-03-22 NOTE — HISTORY OF PRESENT ILLNESS
[___ Month(s) Ago] : [unfilled] month(s) ago [FreeTextEntry1] : Today the patient reports to feel well without joint complains. \par -had two episodes of joint complains for which he took colchicine for 3 days. colchicine treated the symptoms. \par -denied episodes of synovitis, or acute gout attacks\par -expressed his wishes to continue with a watchful waiting approach with the use of prn colchicine as needed instead of initiating allopurinol. [Anorexia] : no anorexia [Weight Loss] : no weight loss [Malaise] : no malaise [Fever] : no fever [Chills] : no chills [Fatigue] : no fatigue [Malar Facial Rash] : no malar facial rash [Skin Lesions] : no lesions [Skin Nodules] : no skin nodules [Oral Ulcers] : no oral ulcers [Cough] : no cough [Dry Mouth] : no dry mouth [Dysphonia] : no dysphonia [Dysphagia] : no dysphagia [Shortness of Breath] : no shortness of breath [Chest Pain] : no chest pain [Arthralgias] : no arthralgias [Joint Swelling] : no joint swelling [Joint Warmth] : no joint warmth [Joint Deformity] : no joint deformity [Decreased ROM] : no decreased range of motion [Morning Stiffness] : no morning stiffness [Falls] : no falls [Dyspnea] : no dyspnea [Myalgias] : no myalgias [Muscle Weakness] : no muscle weakness [Muscle Spasms] : no muscle spasms [Muscle Cramping] : no muscle cramping [Visual Changes] : no visual changes [Eye Pain] : no eye pain [Eye Redness] : no eye redness [Dry Eyes] : no dry eyes

## 2021-03-22 NOTE — PHYSICAL EXAM
[General Appearance - Alert] : alert [General Appearance - In No Acute Distress] : in no acute distress [Sclera] : the sclera and conjunctiva were normal [Outer Ear] : the ears and nose were normal in appearance [Oropharynx] : the oropharynx was normal [Neck Appearance] : the appearance of the neck was normal [Neck Cervical Mass (___cm)] : no neck mass was observed [Jugular Venous Distention Increased] : there was no jugular-venous distention [Thyroid Diffuse Enlargement] : the thyroid was not enlarged [Thyroid Nodule] : there were no palpable thyroid nodules [Auscultation Breath Sounds / Voice Sounds] : lungs were clear to auscultation bilaterally [Heart Rate And Rhythm] : heart rate was normal and rhythm regular [Heart Sounds] : normal S1 and S2 [Heart Sounds Gallop] : no gallops [Murmurs] : no murmurs [Heart Sounds Pericardial Friction Rub] : no pericardial rub [Edema] : there was no peripheral edema [Bowel Sounds] : normal bowel sounds [Abdomen Soft] : soft [Abdomen Tenderness] : non-tender [Abdomen Mass (___ Cm)] : no abdominal mass palpated [Cervical Lymph Nodes Enlarged Posterior Bilaterally] : posterior cervical [Cervical Lymph Nodes Enlarged Anterior Bilaterally] : anterior cervical [Supraclavicular Lymph Nodes Enlarged Bilaterally] : supraclavicular [No CVA Tenderness] : no ~M costovertebral angle tenderness [No Spinal Tenderness] : no spinal tenderness [Abnormal Walk] : normal gait [Nail Clubbing] : no clubbing  or cyanosis of the fingernails [Musculoskeletal - Swelling] : no joint swelling seen [Motor Tone] : muscle strength and tone were normal [Skin Color & Pigmentation] : normal skin color and pigmentation [Skin Turgor] : normal skin turgor [] : no rash [No Focal Deficits] : no focal deficits [Oriented To Time, Place, And Person] : oriented to person, place, and time [Impaired Insight] : insight and judgment were intact [Affect] : the affect was normal [FreeTextEntry1] : No synovitis, full ROM in all joints\par

## 2021-03-22 NOTE — ASSESSMENT
[FreeTextEntry1] : 49yo man with PMHx of CAD, MI and gout presented to the rheumatology office to establish care.\par \par \par Impression:\par Gout with more than 2 episodes a year. hyperuricemia on last uric acid measurement \par \par Uric acid lowering therapy indicated but the patient refuse to start allopurinol. agreed to continue with colchicine prn, diet and exercise. \par \par Recommendations:\par -treatment recommendations were discussed and the patient declined for the moment to start uric acid lowering therapy. he prefers to continue with a strict diet plan, weight loss and PRN use of colchicine to treat early signs of gout attacks.\par -Colchicine 1.2 mg once followed by 0.6 mg once if early signs of an acute gout develops\par \par -follow up in 6 months \par \par \par D/w Dr. Saxena \par \par \par \par

## 2021-03-25 ENCOUNTER — APPOINTMENT (OUTPATIENT)
Dept: CARDIOLOGY | Facility: CLINIC | Age: 51
End: 2021-03-25

## 2021-03-29 ENCOUNTER — APPOINTMENT (OUTPATIENT)
Dept: CARDIOLOGY | Facility: CLINIC | Age: 51
End: 2021-03-29
Payer: COMMERCIAL

## 2021-03-29 PROCEDURE — 78452 HT MUSCLE IMAGE SPECT MULT: CPT

## 2021-03-29 PROCEDURE — 93015 CV STRESS TEST SUPVJ I&R: CPT

## 2021-03-29 PROCEDURE — 99072 ADDL SUPL MATRL&STAF TM PHE: CPT

## 2021-03-29 PROCEDURE — A9500: CPT

## 2021-04-02 ENCOUNTER — RX RENEWAL (OUTPATIENT)
Age: 51
End: 2021-04-02

## 2021-04-06 RX ORDER — ISOSORBIDE MONONITRATE 60 MG/1
60 TABLET, EXTENDED RELEASE ORAL DAILY
Qty: 90 | Refills: 1 | Status: DISCONTINUED | COMMUNITY
Start: 2020-09-14 | End: 2021-04-06

## 2021-04-07 ENCOUNTER — APPOINTMENT (OUTPATIENT)
Dept: CARDIOLOGY | Facility: CLINIC | Age: 51
End: 2021-04-07
Payer: COMMERCIAL

## 2021-04-07 ENCOUNTER — NON-APPOINTMENT (OUTPATIENT)
Age: 51
End: 2021-04-07

## 2021-04-07 VITALS
DIASTOLIC BLOOD PRESSURE: 60 MMHG | SYSTOLIC BLOOD PRESSURE: 120 MMHG | OXYGEN SATURATION: 96 % | TEMPERATURE: 97.9 F | HEIGHT: 73 IN | HEART RATE: 65 BPM | WEIGHT: 255 LBS | BODY MASS INDEX: 33.8 KG/M2

## 2021-04-07 PROCEDURE — 99214 OFFICE O/P EST MOD 30 MIN: CPT

## 2021-04-07 PROCEDURE — 93000 ELECTROCARDIOGRAM COMPLETE: CPT

## 2021-04-07 PROCEDURE — 99072 ADDL SUPL MATRL&STAF TM PHE: CPT

## 2021-06-07 NOTE — H&P PST ADULT - FALLEN IN THE PAST
Attending Statement:. I saw and evaluated the patient. I discussed the patient's case with the Resident.     Patient was seen via face-to-face    Resident precepted via face-to-face    Romulo Shearer MD     no

## 2021-06-21 NOTE — DISCHARGE NOTE ADULT - MEDICATION SUMMARY - MEDICATIONS TO CHANGE
I will SWITCH the dose or number of times a day I take the medications listed below when I get home from the hospital:  None 05-May-2021

## 2021-09-27 ENCOUNTER — APPOINTMENT (OUTPATIENT)
Dept: RHEUMATOLOGY | Facility: CLINIC | Age: 51
End: 2021-09-27
Payer: COMMERCIAL

## 2021-09-27 VITALS
OXYGEN SATURATION: 97 % | RESPIRATION RATE: 16 BRPM | SYSTOLIC BLOOD PRESSURE: 123 MMHG | HEIGHT: 73 IN | WEIGHT: 241 LBS | DIASTOLIC BLOOD PRESSURE: 57 MMHG | HEART RATE: 58 BPM | TEMPERATURE: 97.9 F | BODY MASS INDEX: 31.94 KG/M2

## 2021-09-27 PROCEDURE — 99214 OFFICE O/P EST MOD 30 MIN: CPT

## 2021-09-27 NOTE — ASSESSMENT
[FreeTextEntry1] : 51 year old male with:\par 1)  Gout:  \par   - Previously recommended allopurinol, but pt declined.  R/B/A discussed.\par   - Cont colchicine prn for flares.\par   - Check x-rays of feet prior to next visit - r/o underlying joint damage.\par   - Previously discussed gout dietary recommendations.\par   - Encouraged pt to "keep up the good work" regarding diet, exercise.\par 2)  OA of right knee s/p prior surgery.  Pt reports that he has been pain-free though the knee becomes displaced at times.\par   - Reiterated importance of knee exercises\par   - Knee brace, frank w/ running.\par \par Pt has received the first dose of the COVID19 vaccine.

## 2021-09-27 NOTE — PHYSICAL EXAM
[General Appearance - Alert] : alert [General Appearance - In No Acute Distress] : in no acute distress [Sclera] : the sclera and conjunctiva were normal [Outer Ear] : the ears and nose were normal in appearance [Oropharynx] : the oropharynx was normal [Neck Appearance] : the appearance of the neck was normal [Neck Cervical Mass (___cm)] : no neck mass was observed [Jugular Venous Distention Increased] : there was no jugular-venous distention [Thyroid Diffuse Enlargement] : the thyroid was not enlarged [Thyroid Nodule] : there were no palpable thyroid nodules [Auscultation Breath Sounds / Voice Sounds] : lungs were clear to auscultation bilaterally [Heart Rate And Rhythm] : heart rate was normal and rhythm regular [Heart Sounds] : normal S1 and S2 [Heart Sounds Gallop] : no gallops [Murmurs] : no murmurs [Heart Sounds Pericardial Friction Rub] : no pericardial rub [Edema] : there was no peripheral edema [Bowel Sounds] : normal bowel sounds [Abdomen Soft] : soft [Abdomen Tenderness] : non-tender [Abdomen Mass (___ Cm)] : no abdominal mass palpated [Cervical Lymph Nodes Enlarged Posterior Bilaterally] : posterior cervical [Cervical Lymph Nodes Enlarged Anterior Bilaterally] : anterior cervical [Supraclavicular Lymph Nodes Enlarged Bilaterally] : supraclavicular [No CVA Tenderness] : no ~M costovertebral angle tenderness [No Spinal Tenderness] : no spinal tenderness [Abnormal Walk] : normal gait [Nail Clubbing] : no clubbing  or cyanosis of the fingernails [Musculoskeletal - Swelling] : no joint swelling seen [Motor Tone] : muscle strength and tone were normal [Skin Color & Pigmentation] : normal skin color and pigmentation [Skin Turgor] : normal skin turgor [] : no rash [No Focal Deficits] : no focal deficits [Oriented To Time, Place, And Person] : oriented to person, place, and time [Impaired Insight] : insight and judgment were intact [Affect] : the affect was normal [FreeTextEntry1] : No synovitis, full ROM in all joints;  (+)crepitus in right knee\par

## 2021-09-27 NOTE — HISTORY OF PRESENT ILLNESS
[___ Month(s) Ago] : [unfilled] month(s) ago [FreeTextEntry1] : Pt reports that he experienced 1 or 2 minor flares since last visit, which resolved within 1-2 days. He also reports that he has been performing diet and exercise and has been losing weight, since which he has been feeling better overall.  He then received the COVID19 vaccine last week, and 2 days later began to experience a flare in his great toe.  He took colchicine and it subsided within 1 day.  RIght knee has been pain-free, though he reports that it "pops out of place" at times.  He says that he sees a chiropractor who "pops it back into place".  No new complaints. [Anorexia] : no anorexia [Weight Loss] : no weight loss [Malaise] : no malaise [Fever] : no fever [Chills] : no chills [Fatigue] : no fatigue [Malar Facial Rash] : no malar facial rash [Skin Lesions] : no lesions [Skin Nodules] : no skin nodules [Oral Ulcers] : no oral ulcers [Cough] : no cough [Dry Mouth] : no dry mouth [Dysphonia] : no dysphonia [Dysphagia] : no dysphagia [Shortness of Breath] : no shortness of breath [Chest Pain] : no chest pain [Arthralgias] : no arthralgias [Joint Swelling] : no joint swelling [Joint Warmth] : no joint warmth [Joint Deformity] : no joint deformity [Decreased ROM] : no decreased range of motion [Morning Stiffness] : no morning stiffness [Falls] : no falls [Dyspnea] : no dyspnea [Myalgias] : no myalgias [Muscle Weakness] : no muscle weakness [Muscle Spasms] : no muscle spasms [Muscle Cramping] : no muscle cramping [Visual Changes] : no visual changes [Eye Pain] : no eye pain [Eye Redness] : no eye redness [Dry Eyes] : no dry eyes

## 2021-10-07 ENCOUNTER — APPOINTMENT (OUTPATIENT)
Dept: CARDIOLOGY | Facility: CLINIC | Age: 51
End: 2021-10-07

## 2021-10-18 ENCOUNTER — RX RENEWAL (OUTPATIENT)
Age: 51
End: 2021-10-18

## 2021-11-05 ENCOUNTER — RX RENEWAL (OUTPATIENT)
Age: 51
End: 2021-11-05

## 2021-12-22 ENCOUNTER — RX RENEWAL (OUTPATIENT)
Age: 51
End: 2021-12-22

## 2022-02-11 ENCOUNTER — RX RENEWAL (OUTPATIENT)
Age: 52
End: 2022-02-11

## 2022-03-25 NOTE — DISCHARGE NOTE PROVIDER - CARE PROVIDER_API CALL
Petar Pierre  CARDIOVASCULAR DISEASE  [address] [facility] [address] [facility], [address]  [place] [address]  Phone: [phone]  Fax: [phone]  Follow Up Time:
MOLECULAR PCR

## 2022-03-28 ENCOUNTER — APPOINTMENT (OUTPATIENT)
Dept: RHEUMATOLOGY | Facility: CLINIC | Age: 52
End: 2022-03-28
Payer: COMMERCIAL

## 2022-03-28 VITALS
SYSTOLIC BLOOD PRESSURE: 124 MMHG | OXYGEN SATURATION: 96 % | WEIGHT: 241.7 LBS | TEMPERATURE: 97.8 F | HEIGHT: 73 IN | RESPIRATION RATE: 16 BRPM | DIASTOLIC BLOOD PRESSURE: 67 MMHG | HEART RATE: 44 BPM | BODY MASS INDEX: 32.03 KG/M2

## 2022-03-28 LAB
BASOPHILS # BLD AUTO: 0.06 K/UL
BASOPHILS NFR BLD AUTO: 1.2 %
EOSINOPHIL # BLD AUTO: 0.33 K/UL
EOSINOPHIL NFR BLD AUTO: 6.4 %
HCT VFR BLD CALC: 45 %
HGB BLD-MCNC: 15.1 G/DL
IMM GRANULOCYTES NFR BLD AUTO: 0.4 %
LYMPHOCYTES # BLD AUTO: 1.58 K/UL
LYMPHOCYTES NFR BLD AUTO: 30.6 %
MAN DIFF?: NORMAL
MCHC RBC-ENTMCNC: 30.9 PG
MCHC RBC-ENTMCNC: 33.6 GM/DL
MCV RBC AUTO: 92.2 FL
MONOCYTES # BLD AUTO: 0.45 K/UL
MONOCYTES NFR BLD AUTO: 8.7 %
NEUTROPHILS # BLD AUTO: 2.72 K/UL
NEUTROPHILS NFR BLD AUTO: 52.7 %
PLATELET # BLD AUTO: 173 K/UL
RBC # BLD: 4.88 M/UL
RBC # FLD: 12.6 %
WBC # FLD AUTO: 5.16 K/UL

## 2022-03-28 PROCEDURE — 99214 OFFICE O/P EST MOD 30 MIN: CPT

## 2022-03-28 NOTE — PHYSICAL EXAM
[General Appearance - Alert] : alert [General Appearance - In No Acute Distress] : in no acute distress [Sclera] : the sclera and conjunctiva were normal [Outer Ear] : the ears and nose were normal in appearance [Oropharynx] : the oropharynx was normal [Neck Appearance] : the appearance of the neck was normal [Jugular Venous Distention Increased] : there was no jugular-venous distention [Neck Cervical Mass (___cm)] : no neck mass was observed [Thyroid Diffuse Enlargement] : the thyroid was not enlarged [Thyroid Nodule] : there were no palpable thyroid nodules [Auscultation Breath Sounds / Voice Sounds] : lungs were clear to auscultation bilaterally [Heart Rate And Rhythm] : heart rate was normal and rhythm regular [Heart Sounds] : normal S1 and S2 [Heart Sounds Gallop] : no gallops [Murmurs] : no murmurs [Heart Sounds Pericardial Friction Rub] : no pericardial rub [Edema] : there was no peripheral edema [Bowel Sounds] : normal bowel sounds [Abdomen Soft] : soft [Abdomen Tenderness] : non-tender [Abdomen Mass (___ Cm)] : no abdominal mass palpated [Cervical Lymph Nodes Enlarged Posterior Bilaterally] : posterior cervical [Cervical Lymph Nodes Enlarged Anterior Bilaterally] : anterior cervical [Supraclavicular Lymph Nodes Enlarged Bilaterally] : supraclavicular [No CVA Tenderness] : no ~M costovertebral angle tenderness [No Spinal Tenderness] : no spinal tenderness [Abnormal Walk] : normal gait [Nail Clubbing] : no clubbing  or cyanosis of the fingernails [Musculoskeletal - Swelling] : no joint swelling seen [Motor Tone] : muscle strength and tone were normal [Skin Color & Pigmentation] : normal skin color and pigmentation [Skin Turgor] : normal skin turgor [] : no rash [No Focal Deficits] : no focal deficits [Oriented To Time, Place, And Person] : oriented to person, place, and time [Impaired Insight] : insight and judgment were intact [Affect] : the affect was normal [FreeTextEntry1] : No synovitis, full ROM in all joints;  (+)crepitus in right knee\par

## 2022-03-28 NOTE — HISTORY OF PRESENT ILLNESS
[___ Month(s) Ago] : [unfilled] month(s) ago [FreeTextEntry1] : Feeling fine overall.  He says that he has been taking colchicine daily for the past 6 weeks and has not experienced any flares.  Right knee remains pain-free, but continues to become displaced at times.   No new complaints. [Anorexia] : no anorexia [Weight Loss] : no weight loss [Malaise] : no malaise [Fever] : no fever [Chills] : no chills [Fatigue] : no fatigue [Malar Facial Rash] : no malar facial rash [Skin Lesions] : no lesions [Skin Nodules] : no skin nodules [Oral Ulcers] : no oral ulcers [Cough] : no cough [Dry Mouth] : no dry mouth [Dysphonia] : no dysphonia [Dysphagia] : no dysphagia [Shortness of Breath] : no shortness of breath [Chest Pain] : no chest pain [Arthralgias] : no arthralgias [Joint Swelling] : no joint swelling [Joint Warmth] : no joint warmth [Joint Deformity] : no joint deformity [Decreased ROM] : no decreased range of motion [Morning Stiffness] : no morning stiffness [Falls] : no falls [Dyspnea] : no dyspnea [Myalgias] : no myalgias [Muscle Weakness] : no muscle weakness [Muscle Spasms] : no muscle spasms [Muscle Cramping] : no muscle cramping [Visual Changes] : no visual changes [Eye Pain] : no eye pain [Eye Redness] : no eye redness [Dry Eyes] : no dry eyes

## 2022-03-29 LAB
ALBUMIN SERPL ELPH-MCNC: 4.5 G/DL
ALP BLD-CCNC: 83 U/L
ALT SERPL-CCNC: 29 U/L
ANION GAP SERPL CALC-SCNC: 12 MMOL/L
AST SERPL-CCNC: 51 U/L
BILIRUB SERPL-MCNC: 0.8 MG/DL
BUN SERPL-MCNC: 17 MG/DL
CALCIUM SERPL-MCNC: 9.2 MG/DL
CHLORIDE SERPL-SCNC: 105 MMOL/L
CO2 SERPL-SCNC: 24 MMOL/L
CREAT SERPL-MCNC: 0.87 MG/DL
EGFR: 105 ML/MIN/1.73M2
GLUCOSE SERPL-MCNC: 99 MG/DL
POTASSIUM SERPL-SCNC: 4.3 MMOL/L
PROT SERPL-MCNC: 6.6 G/DL
SODIUM SERPL-SCNC: 141 MMOL/L
URATE SERPL-MCNC: 6.9 MG/DL

## 2022-03-29 RX ORDER — COLCHICINE 0.6 MG/1
0.6 TABLET ORAL TWICE DAILY
Qty: 60 | Refills: 2 | Status: ACTIVE | COMMUNITY
Start: 2020-11-23 | End: 1900-01-01

## 2022-05-16 ENCOUNTER — APPOINTMENT (OUTPATIENT)
Dept: RHEUMATOLOGY | Facility: CLINIC | Age: 52
End: 2022-05-16
Payer: COMMERCIAL

## 2022-05-16 VITALS
TEMPERATURE: 98 F | SYSTOLIC BLOOD PRESSURE: 114 MMHG | DIASTOLIC BLOOD PRESSURE: 68 MMHG | RESPIRATION RATE: 16 BRPM | OXYGEN SATURATION: 95 % | HEIGHT: 73 IN | BODY MASS INDEX: 31.65 KG/M2 | HEART RATE: 54 BPM | WEIGHT: 238.8 LBS

## 2022-05-16 PROBLEM — I10 ESSENTIAL (PRIMARY) HYPERTENSION: Chronic | Status: ACTIVE | Noted: 2020-09-03

## 2022-05-16 PROBLEM — I25.10 ATHEROSCLEROTIC HEART DISEASE OF NATIVE CORONARY ARTERY WITHOUT ANGINA PECTORIS: Chronic | Status: ACTIVE | Noted: 2020-09-03

## 2022-05-16 PROBLEM — M10.9 GOUT, UNSPECIFIED: Chronic | Status: ACTIVE | Noted: 2020-08-21

## 2022-05-16 PROCEDURE — 99214 OFFICE O/P EST MOD 30 MIN: CPT

## 2022-05-16 NOTE — ASSESSMENT
[FreeTextEntry1] : 51 year old male with:\par 1)  Gout:  No recent flares.\par   - Cont allopurinol 100mg daily for now, colchicine 0.6mg daily.\par   - Check labs, including uric acid and will titrate allopurinol to goal of uric acid < 6.\par   - Previously discussed gout dietary recommendations.\par   - Encouraged pt to "keep up the good work" regarding diet, exercise.\par 2)  OA of right knee, w/ medial and lateral meniscal tears s/p prior surgery.  Pt reports that he has been pain-free though the knee becomes displaced at times.\par   - Reiterated importance of knee exercises\par   - Knee brace, frank w/ running.\par   - Orthopedics f/u.\par \par Pt has received the COVID19 vaccine.

## 2022-05-16 NOTE — HISTORY OF PRESENT ILLNESS
[___ Month(s) Ago] : [unfilled] month(s) ago [FreeTextEntry1] : Continues to feel fine overall.  Tolerating allopurinol.  No gout flares since last visit.  Right knee remains pain-free, but continues to become displaced at times.   No new complaints. [Anorexia] : no anorexia [Weight Loss] : no weight loss [Malaise] : no malaise [Fever] : no fever [Chills] : no chills [Fatigue] : no fatigue [Malar Facial Rash] : no malar facial rash [Skin Lesions] : no lesions [Skin Nodules] : no skin nodules [Oral Ulcers] : no oral ulcers [Cough] : no cough [Dry Mouth] : no dry mouth [Dysphonia] : no dysphonia [Dysphagia] : no dysphagia [Shortness of Breath] : no shortness of breath [Chest Pain] : no chest pain [Arthralgias] : no arthralgias [Joint Swelling] : no joint swelling [Joint Warmth] : no joint warmth [Joint Deformity] : no joint deformity [Decreased ROM] : no decreased range of motion [Morning Stiffness] : no morning stiffness [Falls] : no falls [Dyspnea] : no dyspnea [Myalgias] : no myalgias [Muscle Weakness] : no muscle weakness [Muscle Spasms] : no muscle spasms [Muscle Cramping] : no muscle cramping [Visual Changes] : no visual changes [Eye Pain] : no eye pain [Eye Redness] : no eye redness [Dry Eyes] : no dry eyes

## 2022-05-17 ENCOUNTER — NON-APPOINTMENT (OUTPATIENT)
Age: 52
End: 2022-05-17

## 2022-06-14 ENCOUNTER — RX RENEWAL (OUTPATIENT)
Age: 52
End: 2022-06-14

## 2022-06-28 NOTE — H&P PST ADULT - PRO ANTICIPATED DISCH DISP
Patient said you wanted him to get labs done, however they are not in his chart   He goes to [facility]  He would like to go this week    [phone] home

## 2022-08-01 ENCOUNTER — EMERGENCY (EMERGENCY)
Facility: HOSPITAL | Age: 52
LOS: 1 days | Discharge: HOME | End: 2022-08-01
Attending: EMERGENCY MEDICINE | Admitting: EMERGENCY MEDICINE
Payer: COMMERCIAL

## 2022-08-01 VITALS
TEMPERATURE: 98 F | SYSTOLIC BLOOD PRESSURE: 152 MMHG | DIASTOLIC BLOOD PRESSURE: 83 MMHG | OXYGEN SATURATION: 97 % | HEART RATE: 51 BPM | HEIGHT: 73 IN | RESPIRATION RATE: 15 BRPM | WEIGHT: 240.08 LBS

## 2022-08-01 DIAGNOSIS — Z98.890 OTHER SPECIFIED POSTPROCEDURAL STATES: Chronic | ICD-10-CM

## 2022-08-01 PROCEDURE — 73070 X-RAY EXAM OF ELBOW: CPT

## 2022-08-01 PROCEDURE — 99284 EMERGENCY DEPT VISIT MOD MDM: CPT

## 2022-08-01 PROCEDURE — 73090 X-RAY EXAM OF FOREARM: CPT

## 2022-08-01 PROCEDURE — 73070 X-RAY EXAM OF ELBOW: CPT | Mod: 26,RT

## 2022-08-01 PROCEDURE — 73090 X-RAY EXAM OF FOREARM: CPT | Mod: 26,RT

## 2022-08-01 PROCEDURE — 99284 EMERGENCY DEPT VISIT MOD MDM: CPT | Mod: 25

## 2022-08-01 NOTE — ED PROVIDER NOTE - OBJECTIVE STATEMENT
53 y/o M with c/o right elbow pain after trip and fall today during a race.  no other trauma.  pain with extension.

## 2022-08-01 NOTE — ED ADULT NURSE NOTE - CHPI ED NUR SYMPTOMS NEG
no back pain/no bruising/no deformity/no difficulty bearing weight/no fever/no numbness/no stiffness/no tingling no abrasion/no back pain/no bruising/no deformity/no difficulty bearing weight/no fever/no numbness/no stiffness/no tingling

## 2022-08-01 NOTE — ED ADULT NURSE NOTE - NSICDXPASTMEDICALHX_GEN_ALL_CORE_FT
PAST MEDICAL HISTORY:  CAD (coronary artery disease)     Gout     Gout     HTN (hypertension)     Hypertension Patient with a history of Hypertension, patient reports has not take BP medications in the past 2 years    Other tear of lateral meniscus as current injury     Other tear of medial meniscus, current injury, right knee, initial encounter

## 2022-08-01 NOTE — ED PROVIDER NOTE - CARE PROVIDER_API CALL
Brad Damian (DO)  Orthopaedic Surgery Surgery  [address] [facility], [address] [place] [address]  Phone: [phone]  Fax: [phone]  Follow Up Time:

## 2022-08-01 NOTE — ED PROVIDER NOTE - NSFOLLOWUPINSTRUCTIONS_ED_ALL_ED_FT
A fracture has not been ruled out.  No obvious fracture appreciated.  Please follow up with Orthopedic Surgeon for further evaluation.       Elbow Contusion      An elbow contusion is a deep bruise of the elbow. Deep bruises happen when an injury causes bleeding under the skin. The skin over the deep bruise may turn blue, purple, or yellow.     Minor injuries will give you a bruise that is painless. Deep bruises that are worse may stay painful and swollen for a few weeks.       What are the causes?    Common causes of this condition include:  •A hard hit to the elbow.       •An injury (trauma) to the elbow.      •Direct force on the elbow, such as from a fall.        What increases the risk?    You are more likely to develop this condition if you:  •Play sports or do other physical activities.      •Use blood thinners.        What are the signs or symptoms?    Symptoms of this condition include:  •Swelling of the elbow.       •Pain and tenderness of the elbow.       •Change in skin color at the elbow. The area may have redness and then turn blue, purple, or yellow.        How is this treated?    This condition may be treated with:  •Rest, ice, pressure (compression), and elevation. This is often called RICE therapy.      •A sling or splint to support your injury.       •Over-the-counter medicines, such as ibuprofen, for pain control.      •Range-of-motion exercises.        Follow these instructions at home:      RICE therapy   A person holding an ice pack on an injured elbow.   •Rest the injured area.    •If told, put ice on the injured area:  •If you have a removable sling or splint, remove it as told by your doctor.      •Put ice in a plastic bag.      •Place a towel between your skin and the bag.      •Leave the ice on for 20 minutes, 2–3 times a day.      •If told, put light pressure on the injured area using an elastic bandage.  •Make sure the bandage is not wrapped too tightly.      •Remove and reapply the bandage as told by your doctor.        •Raise (elevate) the injured area above the level of your heart while you are sitting or lying down.        If you have a sling or splint:   A sling on a person's arm.   •Wear the sling or splint as told by your doctor. Remove it only as told by your doctor.    •Loosen the sling or splint if your fingers:  •Tingle.      •Become numb.      •Turn cold and blue.        •Keep the sling or splint clean.    •If the sling or splint is not waterproof:  •Do not let it get wet.      •Cover it with a watertight covering when you take a bath or a shower.        General instructions     •Take over-the-counter and prescription medicines only as told by your doctor.      •Return to your normal activities as told by your doctor. Ask your doctor what activities are safe for you.      •Do range-of-motion exercises only as told by your doctor.      •Ask your doctor when it is safe to drive if you have a sling or splint on your arm.      •Wear elbow pads as told by your doctor.      •Keep all follow-up visits as told by your doctor. This is important.        Contact a doctor if:    •Your symptoms do not get better after many days of treatment.      •You have more redness, swelling, or pain in your elbow.      •You have trouble moving the injured area.      •Medicine does not help your pain or swelling.        Get help right away if:    •Your skin over the bruise breaks and starts to bleed.      •You have very bad pain.      •You have numbness in your hand or fingers.      •Your hand or fingers turn very light (pale) or cold.      •You have swelling of your hand and fingers.      •You cannot move your fingers or wrist.        Summary    •An elbow contusion is a deep bruise of the elbow.      •The skin over the deep bruise may turn blue, purple, or yellow.      •Rest the injured area and put ice on the area as told by your doctor.      •If told, put light pressure on the injured area using an elastic bandage.      •Raise (elevate) the injured area above the level of your heart while you are sitting or lying down.      This information is not intended to replace advice given to you by your health care provider. Make sure you discuss any questions you have with your health care provider.

## 2022-08-01 NOTE — ED PROVIDER NOTE - PATIENT PORTAL LINK FT
You can access the FollowMyHealth Patient Portal offered by [facility] by registering at the following website: [email or web address]. By joining [facility]’s FollowMyHealth portal, you will also be able to view your health information using other applications (apps) compatible with our system.

## 2022-08-01 NOTE — ED ADULT NURSE NOTE - NSICDXPASTSURGICALHX_GEN_ALL_CORE_FT
PAST SURGICAL HISTORY:  H/O cardiac catheterization x 5 stents    No significant past surgical history

## 2022-08-01 NOTE — ED ADULT NURSE NOTE - OBJECTIVE STATEMENT
Pt states he was warming up for a run, tripped and fell, c/o pain to right elbow and forearm. Cold pack applies. Negative deformity, radial pulse +, fingers warm and mobile, cap refill < 2 seconds, nailbeds pink

## 2022-08-12 LAB
ALBUMIN SERPL ELPH-MCNC: 4.6 G/DL
ALP BLD-CCNC: 83 U/L
ALT SERPL-CCNC: 28 U/L
ANION GAP SERPL CALC-SCNC: 10 MMOL/L
AST SERPL-CCNC: 20 U/L
BASOPHILS # BLD AUTO: 0.07 K/UL
BASOPHILS NFR BLD AUTO: 1.2 %
BILIRUB SERPL-MCNC: 0.5 MG/DL
BUN SERPL-MCNC: 20 MG/DL
CALCIUM SERPL-MCNC: 9.2 MG/DL
CHLORIDE SERPL-SCNC: 106 MMOL/L
CO2 SERPL-SCNC: 26 MMOL/L
CREAT SERPL-MCNC: 0.98 MG/DL
EGFR: 93 ML/MIN/1.73M2
EOSINOPHIL # BLD AUTO: 0.28 K/UL
EOSINOPHIL NFR BLD AUTO: 4.8 %
GLUCOSE SERPL-MCNC: 90 MG/DL
HCT VFR BLD CALC: 45.3 %
HGB BLD-MCNC: 15.7 G/DL
IMM GRANULOCYTES NFR BLD AUTO: 0.2 %
LYMPHOCYTES # BLD AUTO: 1.67 K/UL
LYMPHOCYTES NFR BLD AUTO: 28.6 %
MAN DIFF?: NORMAL
MCHC RBC-ENTMCNC: 31.6 PG
MCHC RBC-ENTMCNC: 34.7 GM/DL
MCV RBC AUTO: 91.1 FL
MONOCYTES # BLD AUTO: 0.47 K/UL
MONOCYTES NFR BLD AUTO: 8.1 %
NEUTROPHILS # BLD AUTO: 3.33 K/UL
NEUTROPHILS NFR BLD AUTO: 57.1 %
PLATELET # BLD AUTO: 215 K/UL
POTASSIUM SERPL-SCNC: 4.2 MMOL/L
PROT SERPL-MCNC: 6.8 G/DL
RBC # BLD: 4.97 M/UL
RBC # FLD: 12.2 %
SODIUM SERPL-SCNC: 142 MMOL/L
URATE SERPL-MCNC: 6.4 MG/DL
WBC # FLD AUTO: 5.83 K/UL

## 2022-08-15 ENCOUNTER — APPOINTMENT (OUTPATIENT)
Dept: RHEUMATOLOGY | Facility: CLINIC | Age: 52
End: 2022-08-15

## 2022-08-15 VITALS
BODY MASS INDEX: 32.07 KG/M2 | SYSTOLIC BLOOD PRESSURE: 130 MMHG | TEMPERATURE: 97.8 F | OXYGEN SATURATION: 96 % | RESPIRATION RATE: 16 BRPM | DIASTOLIC BLOOD PRESSURE: 70 MMHG | HEIGHT: 73 IN | HEART RATE: 45 BPM | WEIGHT: 242 LBS

## 2022-08-15 PROCEDURE — 99214 OFFICE O/P EST MOD 30 MIN: CPT

## 2022-08-15 NOTE — ASSESSMENT
[FreeTextEntry1] : 51 year old male with:\par 1)  Gout:  Pt w/ a recent flare, triggered by trauma.  Most recent uric acid slightly above goal (6.4), though pt reports that he has recently not been exercising and not been eating as carefully as usual.\par   - Cont allopurinol 100mg daily for now, colchicine 0.6mg daily.\par   - Repeat labs, including uric acid prior to next visit.  If uric acid remains > 6, or if he experiences another flare, will plan to increase uric acid to 200mg daily.\par   - Previously discussed gout dietary recommendations.\par   - Reiterated importance of diet, exercise.\par 2)  OA of right knee, w/ medial and lateral meniscal tears s/p prior surgery.  Pt reports that he has been pain-free though the knee becomes displaced at times.\par   - Reiterated importance of knee exercises\par   - Knee brace, frank w/ running.\par   - Orthopedics f/u.\par \par Pt has received the COVID19 vaccine.

## 2022-08-15 NOTE — HISTORY OF PRESENT ILLNESS
[___ Month(s) Ago] : [unfilled] month(s) ago [FreeTextEntry1] : Pt reports that he fell 2 weeks ago and fractured his right arm - he saw orthopedics and was treated with a sling.  Several hours after the fall, he began to experience severe pain and swelling in the wrist.  It improved after about 7-10 days.  Currently only w/ pain w/ twisting it in certain ways. Right knee remains pain-free, but continues to become displaced at times.   No new complaints. [Anorexia] : no anorexia [Weight Loss] : no weight loss [Malaise] : no malaise [Fever] : no fever [Chills] : no chills [Fatigue] : no fatigue [Malar Facial Rash] : no malar facial rash [Skin Lesions] : no lesions [Skin Nodules] : no skin nodules [Oral Ulcers] : no oral ulcers [Cough] : no cough [Dry Mouth] : no dry mouth [Dysphonia] : no dysphonia [Dysphagia] : no dysphagia [Shortness of Breath] : no shortness of breath [Chest Pain] : no chest pain [Arthralgias] : no arthralgias [Joint Swelling] : no joint swelling [Joint Warmth] : no joint warmth [Joint Deformity] : no joint deformity [Decreased ROM] : no decreased range of motion [Morning Stiffness] : no morning stiffness [Falls] : no falls [Dyspnea] : no dyspnea [Myalgias] : no myalgias [Muscle Weakness] : no muscle weakness [Muscle Spasms] : no muscle spasms [Muscle Cramping] : no muscle cramping [Visual Changes] : no visual changes [Eye Pain] : no eye pain [Eye Redness] : no eye redness [Dry Eyes] : no dry eyes

## 2022-08-15 NOTE — PHYSICAL EXAM
[General Appearance - Alert] : alert [General Appearance - In No Acute Distress] : in no acute distress [Sclera] : the sclera and conjunctiva were normal [Outer Ear] : the ears and nose were normal in appearance [Oropharynx] : the oropharynx was normal [Neck Appearance] : the appearance of the neck was normal [Neck Cervical Mass (___cm)] : no neck mass was observed [Jugular Venous Distention Increased] : there was no jugular-venous distention [Thyroid Diffuse Enlargement] : the thyroid was not enlarged [Thyroid Nodule] : there were no palpable thyroid nodules [Auscultation Breath Sounds / Voice Sounds] : lungs were clear to auscultation bilaterally [Heart Rate And Rhythm] : heart rate was normal and rhythm regular [Heart Sounds] : normal S1 and S2 [Heart Sounds Gallop] : no gallops [Murmurs] : no murmurs [Heart Sounds Pericardial Friction Rub] : no pericardial rub [Edema] : there was no peripheral edema [Bowel Sounds] : normal bowel sounds [Abdomen Soft] : soft [Abdomen Tenderness] : non-tender [Abdomen Mass (___ Cm)] : no abdominal mass palpated [Cervical Lymph Nodes Enlarged Posterior Bilaterally] : posterior cervical [Cervical Lymph Nodes Enlarged Anterior Bilaterally] : anterior cervical [Supraclavicular Lymph Nodes Enlarged Bilaterally] : supraclavicular [No CVA Tenderness] : no ~M costovertebral angle tenderness [No Spinal Tenderness] : no spinal tenderness [Abnormal Walk] : normal gait [Nail Clubbing] : no clubbing  or cyanosis of the fingernails [Musculoskeletal - Swelling] : no joint swelling seen [Motor Tone] : muscle strength and tone were normal [Skin Turgor] : normal skin turgor [Skin Color & Pigmentation] : normal skin color and pigmentation [] : no rash [No Focal Deficits] : no focal deficits [Impaired Insight] : insight and judgment were intact [Oriented To Time, Place, And Person] : oriented to person, place, and time [Affect] : the affect was normal [FreeTextEntry1] : No synovitis, full ROM in all joints;  (+)crepitus in right knee\par

## 2022-09-08 ENCOUNTER — RX RENEWAL (OUTPATIENT)
Age: 52
End: 2022-09-08

## 2022-09-14 ENCOUNTER — APPOINTMENT (OUTPATIENT)
Dept: MRI IMAGING | Facility: CLINIC | Age: 52
End: 2022-09-14

## 2022-09-14 PROCEDURE — 72148 MRI LUMBAR SPINE W/O DYE: CPT

## 2022-09-26 ENCOUNTER — APPOINTMENT (OUTPATIENT)
Dept: ORTHOPEDIC SURGERY | Facility: CLINIC | Age: 52
End: 2022-09-26

## 2022-09-26 ENCOUNTER — NON-APPOINTMENT (OUTPATIENT)
Age: 52
End: 2022-09-26

## 2022-09-26 VITALS
SYSTOLIC BLOOD PRESSURE: 153 MMHG | BODY MASS INDEX: 33.13 KG/M2 | DIASTOLIC BLOOD PRESSURE: 96 MMHG | WEIGHT: 250 LBS | HEIGHT: 73 IN | HEART RATE: 65 BPM

## 2022-09-26 DIAGNOSIS — Z87.39 PERSONAL HISTORY OF OTHER DISEASES OF THE MUSCULOSKELETAL SYSTEM AND CONNECTIVE TISSUE: ICD-10-CM

## 2022-09-26 PROCEDURE — 99204 OFFICE O/P NEW MOD 45 MIN: CPT

## 2022-09-26 PROCEDURE — 72100 X-RAY EXAM L-S SPINE 2/3 VWS: CPT

## 2022-10-17 ENCOUNTER — NON-APPOINTMENT (OUTPATIENT)
Age: 52
End: 2022-10-17

## 2022-10-17 ENCOUNTER — APPOINTMENT (OUTPATIENT)
Dept: MRI IMAGING | Facility: CLINIC | Age: 52
End: 2022-10-17

## 2022-10-17 PROCEDURE — 72148 MRI LUMBAR SPINE W/O DYE: CPT

## 2022-10-24 ENCOUNTER — APPOINTMENT (OUTPATIENT)
Dept: ORTHOPEDIC SURGERY | Facility: CLINIC | Age: 52
End: 2022-10-24

## 2022-10-24 VITALS — DIASTOLIC BLOOD PRESSURE: 74 MMHG | HEART RATE: 54 BPM | SYSTOLIC BLOOD PRESSURE: 117 MMHG

## 2022-10-24 PROCEDURE — 99214 OFFICE O/P EST MOD 30 MIN: CPT

## 2022-10-26 ENCOUNTER — APPOINTMENT (OUTPATIENT)
Dept: ORTHOPEDIC SURGERY | Facility: CLINIC | Age: 52
End: 2022-10-26

## 2022-10-26 ENCOUNTER — NON-APPOINTMENT (OUTPATIENT)
Age: 52
End: 2022-10-26

## 2022-10-26 VITALS
WEIGHT: 250 LBS | HEIGHT: 73 IN | SYSTOLIC BLOOD PRESSURE: 137 MMHG | DIASTOLIC BLOOD PRESSURE: 79 MMHG | HEART RATE: 55 BPM | BODY MASS INDEX: 33.13 KG/M2

## 2022-10-26 PROCEDURE — 99204 OFFICE O/P NEW MOD 45 MIN: CPT

## 2022-11-02 VITALS
TEMPERATURE: 98 F | WEIGHT: 250 LBS | HEART RATE: 53 BPM | SYSTOLIC BLOOD PRESSURE: 156 MMHG | OXYGEN SATURATION: 99 % | RESPIRATION RATE: 20 BRPM | HEIGHT: 73 IN | DIASTOLIC BLOOD PRESSURE: 82 MMHG

## 2022-11-02 NOTE — ASU PATIENT PROFILE, ADULT - FALL HARM RISK - UNIVERSAL INTERVENTIONS
Bed in lowest position, wheels locked, appropriate side rails in place/Call bell, personal items and telephone in reach/Instruct patient to call for assistance before getting out of bed or chair/Non-slip footwear when patient is out of bed/[place] to call system/Physically safe environment - no spills, clutter or unnecessary equipment/Purposeful Proactive Rounding/Room/bathroom lighting operational, light cord in reach

## 2022-11-03 ENCOUNTER — TRANSCRIPTION ENCOUNTER (OUTPATIENT)
Age: 52
End: 2022-11-03

## 2022-11-03 ENCOUNTER — OUTPATIENT (OUTPATIENT)
Dept: OUTPATIENT SERVICES | Facility: HOSPITAL | Age: 52
LOS: 1 days | End: 2022-11-03
Payer: COMMERCIAL

## 2022-11-03 VITALS
HEART RATE: 52 BPM | OXYGEN SATURATION: 97 % | DIASTOLIC BLOOD PRESSURE: 94 MMHG | RESPIRATION RATE: 19 BRPM | SYSTOLIC BLOOD PRESSURE: 164 MMHG

## 2022-11-03 DIAGNOSIS — M54.16 RADICULOPATHY, LUMBAR REGION: ICD-10-CM

## 2022-11-03 DIAGNOSIS — Z98.890 OTHER SPECIFIED POSTPROCEDURAL STATES: Chronic | ICD-10-CM

## 2022-11-03 PROCEDURE — 62323 NJX INTERLAMINAR LMBR/SAC: CPT

## 2022-11-03 RX ORDER — ALLOPURINOL 300 MG
100 TABLET ORAL
Qty: 0 | Refills: 0 | DISCHARGE

## 2022-11-03 NOTE — ASU DISCHARGE PLAN (ADULT/PEDIATRIC) - NS MD DC FALL RISK RISK
For information on Fall & Injury Prevention, visit: [email or web address] OR  [email or web address] OR  https://www.cdc.gov/steadi/patient.html

## 2022-11-29 ENCOUNTER — RX RENEWAL (OUTPATIENT)
Age: 52
End: 2022-11-29

## 2022-12-01 LAB
ALBUMIN SERPL ELPH-MCNC: 4.2 G/DL
ALP BLD-CCNC: 89 U/L
ALT SERPL-CCNC: 34 U/L
ANION GAP SERPL CALC-SCNC: 12 MMOL/L
AST SERPL-CCNC: 31 U/L
BASOPHILS # BLD AUTO: 0.06 K/UL
BASOPHILS NFR BLD AUTO: 1.1 %
BILIRUB SERPL-MCNC: 0.4 MG/DL
BUN SERPL-MCNC: 17 MG/DL
CALCIUM SERPL-MCNC: 9.2 MG/DL
CHLORIDE SERPL-SCNC: 105 MMOL/L
CO2 SERPL-SCNC: 26 MMOL/L
CREAT SERPL-MCNC: 0.95 MG/DL
EGFR: 97 ML/MIN/1.73M2
EOSINOPHIL # BLD AUTO: 0.32 K/UL
EOSINOPHIL NFR BLD AUTO: 5.7 %
GLUCOSE SERPL-MCNC: 90 MG/DL
HCT VFR BLD CALC: 44.6 %
HGB BLD-MCNC: 15.2 G/DL
IMM GRANULOCYTES NFR BLD AUTO: 0.2 %
LYMPHOCYTES # BLD AUTO: 1.79 K/UL
LYMPHOCYTES NFR BLD AUTO: 31.7 %
MAN DIFF?: NORMAL
MCHC RBC-ENTMCNC: 31.7 PG
MCHC RBC-ENTMCNC: 34.1 GM/DL
MCV RBC AUTO: 92.9 FL
MONOCYTES # BLD AUTO: 0.58 K/UL
MONOCYTES NFR BLD AUTO: 10.3 %
NEUTROPHILS # BLD AUTO: 2.89 K/UL
NEUTROPHILS NFR BLD AUTO: 51 %
PLATELET # BLD AUTO: 181 K/UL
POTASSIUM SERPL-SCNC: 4.2 MMOL/L
PROT SERPL-MCNC: 7 G/DL
RBC # BLD: 4.8 M/UL
RBC # FLD: 12.7 %
SODIUM SERPL-SCNC: 143 MMOL/L
URATE SERPL-MCNC: 6.3 MG/DL
WBC # FLD AUTO: 5.65 K/UL

## 2022-12-05 ENCOUNTER — APPOINTMENT (OUTPATIENT)
Dept: RHEUMATOLOGY | Facility: CLINIC | Age: 52
End: 2022-12-05

## 2022-12-05 VITALS
SYSTOLIC BLOOD PRESSURE: 132 MMHG | TEMPERATURE: 98 F | DIASTOLIC BLOOD PRESSURE: 76 MMHG | HEIGHT: 73 IN | RESPIRATION RATE: 16 BRPM | BODY MASS INDEX: 34.99 KG/M2 | WEIGHT: 264 LBS | OXYGEN SATURATION: 98 % | HEART RATE: 78 BPM

## 2022-12-05 PROCEDURE — 99214 OFFICE O/P EST MOD 30 MIN: CPT

## 2022-12-05 NOTE — ASSESSMENT
[FreeTextEntry1] : 51 year old male with:\par 1)  Gout:  Most recent uric acid slightly above goal (6.3), though no recent flares.\par   - Cont allopurinol 100mg daily for now.\par   - Advised pt to call if he experiences another flare.  Would then plan to increase uric acid to 200mg daily.\par   - Previously discussed gout dietary recommendations.\par   - Reiterated importance of exercise.\par 2)  OA of right knee, w/ medial and lateral meniscal tears s/p prior surgery.  Pt reports that he has been pain-free though the knee becomes displaced at times.\par   - Reiterated importance of knee exercises\par   - Knee brace, frank w/ running.\par   - Orthopedics f/u.\par 3)  LBP:  MRI revealed large disc extrusion at L5-S1 contributing to stenosis.  Symptoms have significantly improved w/ PT.\par   - Cont PT / exercise\par   - Analgesia as above\par   - Ortho/spine f/u.\par Pt has received the COVID19 vaccine.

## 2022-12-05 NOTE — HISTORY OF PRESENT ILLNESS
[___ Month(s) Ago] : [unfilled] month(s) ago [FreeTextEntry1] : Pt reports that soon after his last visit, he began to experience severe LBP.  He saw orthopedics who diagnosed him with a herniated disc.  He was referred for PT, and the pain has been steadily improving.  Otherwise, feeling fine since last visit.  No gout flares.  Right knee remains pain-free, and not becoming displaced as often as he has been working on it at PT as well.   No new complaints. [Anorexia] : no anorexia [Weight Loss] : no weight loss [Malaise] : no malaise [Fever] : no fever [Chills] : no chills [Fatigue] : no fatigue [Malar Facial Rash] : no malar facial rash [Skin Lesions] : no lesions [Skin Nodules] : no skin nodules [Oral Ulcers] : no oral ulcers [Cough] : no cough [Dry Mouth] : no dry mouth [Dysphonia] : no dysphonia [Dysphagia] : no dysphagia [Shortness of Breath] : no shortness of breath [Chest Pain] : no chest pain [Arthralgias] : no arthralgias [Joint Swelling] : no joint swelling [Joint Warmth] : no joint warmth [Joint Deformity] : no joint deformity [Decreased ROM] : no decreased range of motion [Morning Stiffness] : no morning stiffness [Falls] : no falls [Dyspnea] : no dyspnea [Myalgias] : no myalgias [Muscle Weakness] : no muscle weakness [Muscle Spasms] : no muscle spasms [Muscle Cramping] : no muscle cramping [Visual Changes] : no visual changes [Eye Pain] : no eye pain [Eye Redness] : no eye redness [Dry Eyes] : no dry eyes

## 2023-02-01 NOTE — H&P ADULT - NSHPATTENDINGPLANDISCUSS_GEN_ALL_CORE
NEUROPSYCHOLOGICAL EVALUATION FEEDBACK    Referral Information  Name: Mendy Dias MRN: [id number]  Age: 79 y.o.    : 1943  Race: White    Gender: female        Chief complaint leading to consultation/medical necessity: Feedback from neuropsychological evaluation.  Established Patient - Telehealth Visit   Patient location: [place], [place]  Visit type: Virtual visit with audio only (telephone)  The reason for the audio only service rather than synchronous audio and video virtual visit was related to technical difficulties or patient preference/necessity.  Total time spent with patient: 60 minutes.  Billin attached to testing visit on 23.  Each patient to whom he or she provides medical services by telemedicine is: (1) informed of the relationship between the physician and patient and the respective role of any other health care provider with respect to management of the patient; and (2) notified that he or she may decline to receive medical services by telemedicine and may withdraw from such care at any time. Patient verbally consented to receive this service via voice-only telephone call.   This service was not originating from a related E/M service provided within the previous 7 days nor will  to an E/M service or procedure within the next 24 hours or my soonest available appointment.  Prevailing standard of care was able to be met in this audio-only visit.   Consent/Emergency Plan: The patient expressed an understanding of the purpose of the evaluation and consented to all procedures. I informed the patient of limits to confidentiality and discussed an emergency plan.    FEEDBACK NOTE       Ms. Mendy Dias and her son participated in a telephone feedback session today.  We discussed the results of the neuropsychological evaluation. I gave time to discuss questions and concerns. A copy of a evaluation report will be provided to Ms. Dias via portal  mika Macario, Ph.D.  Licensed Clinical Neuropsychologist  [facility] - Department of Neurology        pt.

## 2023-02-16 ENCOUNTER — NON-APPOINTMENT (OUTPATIENT)
Age: 53
End: 2023-02-16

## 2023-03-06 ENCOUNTER — APPOINTMENT (OUTPATIENT)
Dept: RHEUMATOLOGY | Facility: CLINIC | Age: 53
End: 2023-03-06
Payer: COMMERCIAL

## 2023-03-06 VITALS
OXYGEN SATURATION: 97 % | SYSTOLIC BLOOD PRESSURE: 110 MMHG | BODY MASS INDEX: 33.67 KG/M2 | RESPIRATION RATE: 16 BRPM | HEART RATE: 46 BPM | DIASTOLIC BLOOD PRESSURE: 64 MMHG | HEIGHT: 73 IN | WEIGHT: 254.06 LBS | TEMPERATURE: 98 F

## 2023-03-06 PROCEDURE — 99214 OFFICE O/P EST MOD 30 MIN: CPT

## 2023-03-06 NOTE — HISTORY OF PRESENT ILLNESS
[___ Month(s) Ago] : [unfilled] month(s) ago [FreeTextEntry1] : Feeling fine overall since last visit.  LBP remains much improved.  He experienced an episode of pain in his left ankle -  he took colchicine and the pain quickly resolved.   Right knee remains pain-free, though it still "pops out of place" so he follows w/ a chiropractor who puts it back in place.  No new complaints. [Anorexia] : no anorexia [Weight Loss] : no weight loss [Malaise] : no malaise [Fever] : no fever [Chills] : no chills [Fatigue] : no fatigue [Malar Facial Rash] : no malar facial rash [Skin Lesions] : no lesions [Skin Nodules] : no skin nodules [Oral Ulcers] : no oral ulcers [Cough] : no cough [Dry Mouth] : no dry mouth [Dysphonia] : no dysphonia [Dysphagia] : no dysphagia [Shortness of Breath] : no shortness of breath [Chest Pain] : no chest pain [Arthralgias] : no arthralgias [Joint Swelling] : no joint swelling [Joint Warmth] : no joint warmth [Joint Deformity] : no joint deformity [Decreased ROM] : no decreased range of motion [Morning Stiffness] : no morning stiffness [Falls] : no falls [Dyspnea] : no dyspnea [Myalgias] : no myalgias [Muscle Weakness] : no muscle weakness [Muscle Spasms] : no muscle spasms [Muscle Cramping] : no muscle cramping [Visual Changes] : no visual changes [Eye Pain] : no eye pain [Eye Redness] : no eye redness [Dry Eyes] : no dry eyes

## 2023-03-06 NOTE — ASSESSMENT
[FreeTextEntry1] : 52 year old male with:\par 1)  Gout:  Most recent uric acid slightly above goal (6.3), though no recent flares.\par   - Cont allopurinol 100mg daily for now.\par   - Advised pt to call if he experiences another flare.  Would then plan to increase uric acid to 200mg daily.\par   - Previously discussed gout dietary recommendations.\par   - Reiterated importance of exercise.\par 2)  OA of right knee, w/ medial and lateral meniscal tears s/p prior surgery.  Pt reports that he has been pain-free though the knee becomes displaced at times.\par   - Reiterated importance of knee exercises\par   - Knee brace, frank w/ running.\par   - Orthopedics f/u.\par 3)  LBP:  MRI revealed large disc extrusion at L5-S1 contributing to stenosis.  Symptoms have significantly improved w/ PT.\par   - Cont PT / exercise\par   - Analgesia as above\par   - Ortho/spine f/u.\par Pt has received the COVID19 vaccine.

## 2023-03-08 ENCOUNTER — RX RENEWAL (OUTPATIENT)
Age: 53
End: 2023-03-08

## 2023-03-08 LAB
ALBUMIN SERPL ELPH-MCNC: 4.2 G/DL
ALP BLD-CCNC: 97 U/L
ALT SERPL-CCNC: 30 U/L
ANION GAP SERPL CALC-SCNC: 9 MMOL/L
AST SERPL-CCNC: 28 U/L
BASOPHILS # BLD AUTO: 0.06 K/UL
BASOPHILS NFR BLD AUTO: 1 %
BILIRUB SERPL-MCNC: 0.4 MG/DL
BUN SERPL-MCNC: 16 MG/DL
CALCIUM SERPL-MCNC: 8.7 MG/DL
CHLORIDE SERPL-SCNC: 106 MMOL/L
CO2 SERPL-SCNC: 24 MMOL/L
CREAT SERPL-MCNC: 0.89 MG/DL
EGFR: 104 ML/MIN/1.73M2
EOSINOPHIL # BLD AUTO: 0.25 K/UL
EOSINOPHIL NFR BLD AUTO: 4 %
GLUCOSE SERPL-MCNC: 109 MG/DL
HCT VFR BLD CALC: 44.7 %
HGB BLD-MCNC: 14.5 G/DL
IMM GRANULOCYTES NFR BLD AUTO: 0.2 %
LYMPHOCYTES # BLD AUTO: 1.81 K/UL
LYMPHOCYTES NFR BLD AUTO: 28.8 %
MAN DIFF?: NORMAL
MCHC RBC-ENTMCNC: 30.5 PG
MCHC RBC-ENTMCNC: 32.4 GM/DL
MCV RBC AUTO: 93.9 FL
MONOCYTES # BLD AUTO: 0.6 K/UL
MONOCYTES NFR BLD AUTO: 9.6 %
NEUTROPHILS # BLD AUTO: 3.55 K/UL
NEUTROPHILS NFR BLD AUTO: 56.4 %
PLATELET # BLD AUTO: 175 K/UL
POTASSIUM SERPL-SCNC: 4.1 MMOL/L
PROT SERPL-MCNC: 6.3 G/DL
RBC # BLD: 4.76 M/UL
RBC # FLD: 12.8 %
SODIUM SERPL-SCNC: 138 MMOL/L
URATE SERPL-MCNC: 6.1 MG/DL
WBC # FLD AUTO: 6.28 K/UL

## 2023-03-22 NOTE — ED ADULT NURSE NOTE - CHIEF COMPLAINT QUOTE
Graft Donor Site Bandage (Optional-Leave Blank If You Don't Want In Note): Steri-strips and a pressure bandage were applied to the donor site. pt comes to ED c/o chest pressure  starting today. 2 weeks ago pt had 5 stents placed here. per pt "not the same chest pain but feels pressure" pt denies radiation, denies N/V/D denies SOB denies TEE

## 2023-05-08 ENCOUNTER — APPOINTMENT (OUTPATIENT)
Dept: ORTHOPEDIC SURGERY | Facility: CLINIC | Age: 53
End: 2023-05-08
Payer: COMMERCIAL

## 2023-05-08 VITALS — DIASTOLIC BLOOD PRESSURE: 74 MMHG | HEART RATE: 78 BPM | SYSTOLIC BLOOD PRESSURE: 110 MMHG

## 2023-05-08 DIAGNOSIS — M51.26 OTHER INTERVERTEBRAL DISC DISPLACEMENT, LUMBAR REGION: ICD-10-CM

## 2023-05-08 DIAGNOSIS — M47.816 SPONDYLOSIS W/OUT MYELOPATHY OR RADICULOPATHY, LUMBAR REGION: ICD-10-CM

## 2023-05-08 PROCEDURE — 99214 OFFICE O/P EST MOD 30 MIN: CPT

## 2023-05-08 NOTE — HISTORY OF PRESENT ILLNESS
[de-identified] : The patient returns for follow-up and states he would like to continue with physical therapy.  He had a very large L4-5 central and left-sided herniated disc and has just some slight numbness as a residual to the left toes.  He reports no weakness at this time.  Physical therapy has been provided significant relief.  He would like to continue.  He is on no oral medication. [0] : a current pain level of 0/10

## 2023-05-08 NOTE — PHYSICAL EXAM
[LE] : 5/5 motor strength in bilateral lower extremities [] : Sensory: [L5-LT] : L5 [Normal RLE] : Right Lower Extremity: No scars, rashes, lesions, ulcers, skin intact [Normal LLE] : Left Lower Extremity: No scars, rashes, lesions, ulcers, skin intact [Normal] : Oriented to person, place, and time, insight and judgement were intact and the affect was normal [de-identified] : The patient is able to heel and toe walk without difficulty.

## 2023-05-08 NOTE — DISCUSSION/SUMMARY
[PRN] : PRN [de-identified] : An appropriate prescription was provided for physical therapy.  Activity modifications were discussed at length with the patient.  The patient will follow-up with us as needed.

## 2023-07-10 ENCOUNTER — APPOINTMENT (OUTPATIENT)
Dept: RHEUMATOLOGY | Facility: CLINIC | Age: 53
End: 2023-07-10
Payer: COMMERCIAL

## 2023-07-10 VITALS
SYSTOLIC BLOOD PRESSURE: 139 MMHG | BODY MASS INDEX: 32.34 KG/M2 | HEART RATE: 43 BPM | WEIGHT: 244 LBS | HEIGHT: 73 IN | OXYGEN SATURATION: 97 % | TEMPERATURE: 97.9 F | DIASTOLIC BLOOD PRESSURE: 81 MMHG

## 2023-07-10 PROCEDURE — 99214 OFFICE O/P EST MOD 30 MIN: CPT

## 2023-07-10 NOTE — ASSESSMENT
[FreeTextEntry1] : 52 year old male with:\par 1)  Gout:  Most recent uric acid slightly above goal (6.1), though no recent flares.\par   - Cont allopurinol 100mg daily.\par   - Advised pt to call if he experiences another flare.  Would then plan to increase uric acid to 200mg daily once the flare resolves.\par   - Previously discussed gout dietary recommendations.\par   - Reiterated importance of exercise.\par 2)  OA of right knee, w/ medial and lateral meniscal tears s/p prior surgery.  Pt reports that he has been pain-free though the knee becomes displaced at times.\par   - Reiterated importance of knee exercises\par   - Knee brace, frank w/ running.\par   - Orthopedics f/u.\par   - Tylenol prn (avoiding NSAID's as pt on Plavix).\par   - heating pads or ice packs\par   - OTC topical analgesics\par 3)  LBP:  MRI revealed large disc extrusion at L5-S1 contributing to stenosis.  Symptoms have significantly improved w/ PT.\par   - Cont PT / exercise\par   - Analgesia as above\par   - Ortho/spine f/u.\par Pt has received the COVID19 vaccine.

## 2023-07-10 NOTE — HISTORY OF PRESENT ILLNESS
[___ Month(s) Ago] : [unfilled] month(s) ago [FreeTextEntry1] : Continues to feel fine.  LBP remains much improved.  No gout flares since last visit.   Right knee remains pain-free, though it still "pops out of place" so he follows w/ a chiropractor who "puts it back in place".  Pt recently won a triathlon.  No new complaints. [Anorexia] : no anorexia [Weight Loss] : no weight loss [Malaise] : no malaise [Fever] : no fever [Chills] : no chills [Fatigue] : no fatigue [Malar Facial Rash] : no malar facial rash [Skin Lesions] : no lesions [Skin Nodules] : no skin nodules [Oral Ulcers] : no oral ulcers [Cough] : no cough [Dry Mouth] : no dry mouth [Dysphonia] : no dysphonia [Dysphagia] : no dysphagia [Shortness of Breath] : no shortness of breath [Chest Pain] : no chest pain [Arthralgias] : no arthralgias [Joint Swelling] : no joint swelling [Joint Warmth] : no joint warmth [Joint Deformity] : no joint deformity [Decreased ROM] : no decreased range of motion [Morning Stiffness] : no morning stiffness [Falls] : no falls [Dyspnea] : no dyspnea [Myalgias] : no myalgias [Muscle Weakness] : no muscle weakness [Muscle Spasms] : no muscle spasms [Muscle Cramping] : no muscle cramping [Visual Changes] : no visual changes [Eye Pain] : no eye pain [Eye Redness] : no eye redness [Dry Eyes] : no dry eyes

## 2023-07-11 LAB
ALBUMIN SERPL ELPH-MCNC: 4.2 G/DL
ALP BLD-CCNC: 76 U/L
ALT SERPL-CCNC: 34 U/L
ANION GAP SERPL CALC-SCNC: 11 MMOL/L
AST SERPL-CCNC: 39 U/L
BILIRUB SERPL-MCNC: 0.6 MG/DL
BUN SERPL-MCNC: 17 MG/DL
CALCIUM SERPL-MCNC: 9 MG/DL
CHLORIDE SERPL-SCNC: 105 MMOL/L
CO2 SERPL-SCNC: 25 MMOL/L
CREAT SERPL-MCNC: 0.89 MG/DL
EGFR: 103 ML/MIN/1.73M2
GLUCOSE SERPL-MCNC: 85 MG/DL
POTASSIUM SERPL-SCNC: 4 MMOL/L
PROT SERPL-MCNC: 6.4 G/DL
SODIUM SERPL-SCNC: 141 MMOL/L
URATE SERPL-MCNC: 6.8 MG/DL

## 2023-09-25 NOTE — END OF VISIT
[Time Spent: ___ minutes] : I have spent [unfilled] minutes of time on the encounter. [>50% of the face to face encounter time was spent on counseling and/or coordination of care for ___] : Greater than 50% of the face to face encounter time was spent on counseling and/or coordination of care for [unfilled] CBC and pTT results AND urethra bleeding again

## 2023-09-28 ENCOUNTER — NON-APPOINTMENT (OUTPATIENT)
Age: 53
End: 2023-09-28

## 2023-10-05 NOTE — DISCHARGE NOTE NURSING/CASE MANAGEMENT/SOCIAL WORK - NSDCPETBCESMAN_GEN_ALL_CORE
Lane here today for his regularly scheduled immunotherapy injection, per protocol. Patient in good state of health, received his shot as planned, as recorded in flowsheet for this encounter, waited for 30 minutes after his injection and left the office after that still at their baseline state of health. Will continue allergy immunotherapy as planned and call us in case any symptoms or reaction from today's injection are noted.    If you are a smoker, it is important for your health to stop smoking. Please be aware that second hand smoke is also harmful.

## 2023-11-05 ENCOUNTER — INPATIENT (INPATIENT)
Facility: HOSPITAL | Age: 53
LOS: 0 days | Discharge: HOME | DRG: 312 | End: 2023-11-06
Attending: INTERNAL MEDICINE | Admitting: INTERNAL MEDICINE
Payer: COMMERCIAL

## 2023-11-05 VITALS
WEIGHT: 235.01 LBS | DIASTOLIC BLOOD PRESSURE: 80 MMHG | TEMPERATURE: 99 F | OXYGEN SATURATION: 97 % | RESPIRATION RATE: 18 BRPM | HEART RATE: 64 BPM | SYSTOLIC BLOOD PRESSURE: 148 MMHG

## 2023-11-05 DIAGNOSIS — Z98.890 OTHER SPECIFIED POSTPROCEDURAL STATES: Chronic | ICD-10-CM

## 2023-11-05 DIAGNOSIS — Z98.61 CORONARY ANGIOPLASTY STATUS: Chronic | ICD-10-CM

## 2023-11-05 LAB
ALBUMIN SERPL ELPH-MCNC: 4.8 G/DL — SIGNIFICANT CHANGE UP (ref 3.3–5)
ALP SERPL-CCNC: 77 U/L — SIGNIFICANT CHANGE UP (ref 40–120)
ALT FLD-CCNC: 34 U/L — SIGNIFICANT CHANGE UP (ref 10–45)
ANION GAP SERPL CALC-SCNC: 12 MMOL/L — SIGNIFICANT CHANGE UP (ref 5–17)
APTT BLD: 24.5 SEC — SIGNIFICANT CHANGE UP (ref 24.5–35.6)
AST SERPL-CCNC: 61 U/L — HIGH (ref 10–40)
BASOPHILS # BLD AUTO: 0.05 K/UL — SIGNIFICANT CHANGE UP (ref 0–0.2)
BASOPHILS NFR BLD AUTO: 0.3 % — SIGNIFICANT CHANGE UP (ref 0–2)
BILIRUB SERPL-MCNC: 1.2 MG/DL — SIGNIFICANT CHANGE UP (ref 0.2–1.2)
BUN SERPL-MCNC: 17 MG/DL — SIGNIFICANT CHANGE UP (ref 7–23)
CALCIUM SERPL-MCNC: 9.6 MG/DL — SIGNIFICANT CHANGE UP (ref 8.4–10.5)
CHLORIDE SERPL-SCNC: 102 MMOL/L — SIGNIFICANT CHANGE UP (ref 96–108)
CK MB CFR SERPL CALC: 33.7 NG/ML — HIGH (ref 0–6.7)
CK MB CFR SERPL CALC: 34.3 NG/ML — HIGH (ref 0–6.7)
CK SERPL-CCNC: 1662 U/L — HIGH (ref 30–200)
CK SERPL-CCNC: 1826 U/L — HIGH (ref 30–200)
CO2 SERPL-SCNC: 27 MMOL/L — SIGNIFICANT CHANGE UP (ref 22–31)
CREAT SERPL-MCNC: 1.3 MG/DL — SIGNIFICANT CHANGE UP (ref 0.5–1.3)
EGFR: 66 ML/MIN/1.73M2 — SIGNIFICANT CHANGE UP
EOSINOPHIL # BLD AUTO: 0 K/UL — SIGNIFICANT CHANGE UP (ref 0–0.5)
EOSINOPHIL NFR BLD AUTO: 0 % — SIGNIFICANT CHANGE UP (ref 0–6)
GLUCOSE SERPL-MCNC: 95 MG/DL — SIGNIFICANT CHANGE UP (ref 70–99)
HCT VFR BLD CALC: 45.6 % — SIGNIFICANT CHANGE UP (ref 39–50)
HGB BLD-MCNC: 16.2 G/DL — SIGNIFICANT CHANGE UP (ref 13–17)
IMM GRANULOCYTES NFR BLD AUTO: 0.4 % — SIGNIFICANT CHANGE UP (ref 0–0.9)
INR BLD: 1.02 — SIGNIFICANT CHANGE UP (ref 0.85–1.18)
LACTATE SERPL-SCNC: 2.4 MMOL/L — HIGH (ref 0.5–2)
LACTATE SERPL-SCNC: 2.8 MMOL/L — HIGH (ref 0.5–2)
LYMPHOCYTES # BLD AUTO: 0.88 K/UL — LOW (ref 1–3.3)
LYMPHOCYTES # BLD AUTO: 5.8 % — LOW (ref 13–44)
MAGNESIUM SERPL-MCNC: 1.6 MG/DL — SIGNIFICANT CHANGE UP (ref 1.6–2.6)
MCHC RBC-ENTMCNC: 31.5 PG — SIGNIFICANT CHANGE UP (ref 27–34)
MCHC RBC-ENTMCNC: 35.5 GM/DL — SIGNIFICANT CHANGE UP (ref 32–36)
MCV RBC AUTO: 88.5 FL — SIGNIFICANT CHANGE UP (ref 80–100)
MONOCYTES # BLD AUTO: 1.36 K/UL — HIGH (ref 0–0.9)
MONOCYTES NFR BLD AUTO: 9 % — SIGNIFICANT CHANGE UP (ref 2–14)
NEUTROPHILS # BLD AUTO: 12.84 K/UL — HIGH (ref 1.8–7.4)
NEUTROPHILS NFR BLD AUTO: 84.5 % — HIGH (ref 43–77)
NRBC # BLD: 0 /100 WBCS — SIGNIFICANT CHANGE UP (ref 0–0)
PLATELET # BLD AUTO: 186 K/UL — SIGNIFICANT CHANGE UP (ref 150–400)
POTASSIUM SERPL-MCNC: 4 MMOL/L — SIGNIFICANT CHANGE UP (ref 3.5–5.3)
POTASSIUM SERPL-SCNC: 4 MMOL/L — SIGNIFICANT CHANGE UP (ref 3.5–5.3)
PROT SERPL-MCNC: 7.6 G/DL — SIGNIFICANT CHANGE UP (ref 6–8.3)
PROTHROM AB SERPL-ACNC: 11.6 SEC — SIGNIFICANT CHANGE UP (ref 9.5–13)
RBC # BLD: 5.15 M/UL — SIGNIFICANT CHANGE UP (ref 4.2–5.8)
RBC # FLD: 12.1 % — SIGNIFICANT CHANGE UP (ref 10.3–14.5)
SODIUM SERPL-SCNC: 141 MMOL/L — SIGNIFICANT CHANGE UP (ref 135–145)
TROPONIN T, HIGH SENSITIVITY RESULT: 62 NG/L — CRITICAL HIGH (ref 0–51)
TROPONIN T, HIGH SENSITIVITY RESULT: 78 NG/L — CRITICAL HIGH (ref 0–51)
WBC # BLD: 15.19 K/UL — HIGH (ref 3.8–10.5)
WBC # FLD AUTO: 15.19 K/UL — HIGH (ref 3.8–10.5)

## 2023-11-05 PROCEDURE — 99285 EMERGENCY DEPT VISIT HI MDM: CPT

## 2023-11-05 PROCEDURE — 71045 X-RAY EXAM CHEST 1 VIEW: CPT | Mod: 26

## 2023-11-05 RX ORDER — ASPIRIN/CALCIUM CARB/MAGNESIUM 324 MG
81 TABLET ORAL DAILY
Refills: 0 | Status: DISCONTINUED | OUTPATIENT
Start: 2023-11-05 | End: 2023-11-06

## 2023-11-05 RX ORDER — ALLOPURINOL 300 MG
100 TABLET ORAL AT BEDTIME
Refills: 0 | Status: DISCONTINUED | OUTPATIENT
Start: 2023-11-05 | End: 2023-11-06

## 2023-11-05 RX ORDER — ATORVASTATIN CALCIUM 80 MG/1
80 TABLET, FILM COATED ORAL AT BEDTIME
Refills: 0 | Status: DISCONTINUED | OUTPATIENT
Start: 2023-11-05 | End: 2023-11-06

## 2023-11-05 RX ORDER — MAGNESIUM SULFATE 500 MG/ML
2 VIAL (ML) INJECTION ONCE
Refills: 0 | Status: COMPLETED | OUTPATIENT
Start: 2023-11-05 | End: 2023-11-05

## 2023-11-05 RX ADMIN — Medication 25 GRAM(S): at 21:57

## 2023-11-05 NOTE — ED PROVIDER NOTE - OBJECTIVE STATEMENT
53 m Hx MI/CAD with 5 stents in 2020; had just completed [facility] [place] today around 4:30 pm when he went to medical tent because he started repeatedly yawning; this was his initial symptom when he had heart attack in 2020; at medical tent he had EKG showing frequent PVCs; POC blood test revealed low potassium (he could not recall the number, but thought possibly 0.5??).  He was feeling very weak in the legs, and planned to get himself to the hospital, and as he walked out of the tent he collapsed without warning; LOC possibly up to 1 minute.  He felt better after recovering from the syncopal episode.  There was never any chest pain today, SOB, palpitations, or other unexpected symptoms. Does not have a headache.  He feels expectedly sore in the legs, but otherwise says he feels fairly well now.      Meds: atorvastatin, aspirin, allopurinol 53 m Hx MI/CAD with 5 stents in 2020; had just completed [facility] today around 4:30 pm when he went to medical tent because he started repeatedly yawning; this was his initial symptom when he had heart attack in 2020; at medical tent he had EKG showing frequent PVCs; POC blood test revealed low potassium (he could not recall the number, but thought possibly 0.5??).  He was feeling very weak in the legs, and planned to get himself to the hospital, and as he walked out of the tent he collapsed without warning; LOC possibly up to 1 minute.  He felt better after recovering from the syncopal episode.  There was never any chest pain today, SOB, palpitations, or other unexpected symptoms. Does not have a headache.  He feels expectedly sore in the legs, but otherwise says he feels fairly well now.      Meds: atorvastatin, aspirin, allopurinol 53 m Hx MI/CAD with 5 stents in 2020; had just completed [facility] today around 4:30 pm when he went to medical tent because he started repeatedly yawning; this was his initial symptom when he had heart attack in 2020; at medical tent he had EKG showing frequent PVCs; POC blood test revealed low potassium (he could not recall the number, but thought possibly 0.5??).  He was feeling very weak in the legs, and planned to get himself to the hospital, and as he walked out of the tent he collapsed without warning; LOC possibly up to 1 minute.  He felt better after recovering from the syncopal episode.  There was never any chest pain today, SOB, palpitations, or other unexpected symptoms. Does not have a headache.  He feels expectedly sore in the legs, but otherwise says he feels fairly well now.      Meds: atorvastatin, aspirin, allopurinol 53 m Hx MI/CAD with 5 stents in 2020; had just completed [facility] today around 4:30 pm when he went to medical tent because he started repeatedly yawning; this was his initial symptom when he had heart attack in 2020; at medical tent he had EKG showing frequent PVCs; POC blood test revealed low potassium (he could not recall the number, but thought possibly 0.5??).  He was feeling very weak in the legs, and planned to get himself to the hospital, and as he walked out of the tent he collapsed without warning; LOC possibly up to 1 minute.  He felt better after recovering from the syncopal episode.  There was never any chest pain today, SOB, palpitations, or other unexpected symptoms. Does not have a headache.  He feels expectedly sore in the legs, but otherwise says he feels fairly well now.      Meds: atorvastatin, aspirin, allopurinol  Last echo 6 months ago, no provocative testing since stents were placed. 53 m Hx MI/CAD with 5 stents in 2020; had just completed [facility] [place] today around 4:30 pm when he went to medical tent because he started repeatedly yawning; this was his initial symptom when he had heart attack in 2020; at medical tent he had EKG showing frequent PVCs; POC blood test revealed low potassium (he could not recall the number, but thought possibly 0.5??).  He was feeling very weak in the legs, and planned to get himself to the hospital, and as he walked out of the tent he collapsed without warning; LOC possibly up to 1 minute.  He felt better after recovering from the syncopal episode.  There was never any chest pain today, SOB, palpitations, or other unexpected symptoms. Does not have a headache.  He feels expectedly sore in the legs, but otherwise says he feels fairly well now.      Meds: atorvastatin, aspirin, allopurinol  Last echo 6 months ago, no provocative testing since stents were placed. 53 m Hx MI/CAD with 5 stents in 2020; had just completed [facility] today around 4:30 pm when he went to medical tent because he started repeatedly yawning; this was his initial symptom when he had heart attack in 2020; at medical tent he had EKG showing frequent PVCs; POC blood test revealed low potassium (he could not recall the number, but thought possibly 0.5??).  He was feeling very weak in the legs, and planned to get himself to the hospital, and as he walked out of the tent he collapsed without warning; LOC possibly up to 1 minute.  He felt better after recovering from the syncopal episode.  There was never any chest pain today, SOB, palpitations, or other unexpected symptoms. Does not have a headache.  He feels expectedly sore in the legs, but otherwise says he feels fairly well now.      Meds: atorvastatin, aspirin, allopurinol  Last echo 6 months ago, no provocative testing since stents were placed.

## 2023-11-05 NOTE — ED ADULT NURSE NOTE - OBJECTIVE STATEMENT
53yM ambulatory BIBEMS axo x4 pmhx CAD w/5 stents, HDL qd aspirin complaining of syncopal episode post completion of marathon. After completing marathon patient suddenly felt very shaky/ weak and started to yawn which was similar to his symptoms from his past HA, pt went to med tent and was told he had very low K+ and PVCs and was recommended to come to ER for eval, patient then had a syncopal episode and believes he slid down the wall and denies hitting his head. Patient stated he did not think he was unconscious for more than 30 seconds. Patient diaphoretic. Denies feeling any palpations, CP/SOB, N/V/D. PIV placed, dizziness EKG placed. 53yM ambulatory BIBEMS axox4 pmhx CAD, MI w/5 stents, HDL, on qd aspirin,  complaining of syncopal episode post completion of marathon. After completing marathon patient began to experience malaise, shakiness / generalized weakness, exhaustion,  went to med tent and was told he had very low K+ with an abnormal ekg w/ PVCs and was recommended to come to ER for eval, patient then proceeded to have a syncopal episode for appox 30 seconds, and believes he slid down the wall and denies hitting his head.  Patient arrives awake, alert, oriented, mildly diaphoretic. Denies feeling any palpations, dizziness, vision changes, CP/SOB, N/V/D. PIV placed,  EKG placed. On ccm, pulseox, and BP.

## 2023-11-05 NOTE — H&P ADULT - PROBLEM SELECTOR PLAN 1
currently asymptomatic, EKG revealed NSR@70BPM with TWI lead III and TWF AVF (similar to prior EKGs). CXR without acute findings.  --Orthostatics noted to be positive from sitting to standing: /71 (laying), 151/78 (sitting), 129/76 (standing). Will initiate IVF hydration.  --Mg level 1.5, repleted with Magnesium sulfate 2g IV x 1 dose.

## 2023-11-05 NOTE — H&P ADULT - HISTORY OF PRESENT ILLNESS
53 yr old M with PMHx of HTN, hyperlipidemia, MI/CAD s/p PCI with 5 stents@[facility] 8/2020 (cath details below), gout who presents to [facility] ED 11/5/23 for evaluation of syncope after completing [facility] today. Patient states ~4:30PM today after completing the marathon he kept yawning which was his angina equivalent preceding his MI in 2020, therefore he was prompted to be evaluated.  EKG in the medical tent concerning for frequent PVCs. Patient also had blood drawn which reportedly revealed low electrolytes. Patient had started walking out of the tent to go to the hospital when he collapsed without prodrome and had LOC ~30 seconds. Patient denies any N/V, visual changes, headache, head trauma, diaphoresis, palpitations, chest pain, dizziness, PND, orthopnea, recent sick contacts or travel. Patient had an Echo ~6 months ago which reportedly was normal.       In ED, BP: 148/80, HR: 60s, RR:18, Temp: 98.9F, O2 sat: 97% RA, Fingerstick 83. EKG revealed NSR@70BPM with TWI lead III and TWF AVF. CXR without acute findings. Labs notable for: WBC 15.19 with left shift, BUN/Cr 17/1.3, Mg 1.5, Lactate 2.8 with repeat downtrending 2.4, Trop T 78, CKMB 34.3, CK 1662.    Patient treated with Magnesium sulfate 2g IV x 1 dose.    Patient now admitted to cardiac telemetry for serial cardiac enzymes and further cardiac work-up.           CATH Hx:  @[facility] 8/21/2020: s/p TU pLAD, TU mLAD and TU OM2. Other findings: normal LM, 40% D1 lesion. 30% mLCx lesion, patent mRCA and dRCA stents.  @[facility] 8/20/2020: s/p TU mRCA and TU dRCA.       53 yr old M with PMHx of HTN, hyperlipidemia, MI/CAD s/p PCI with 5 stents@[facility] 8/2020 (cath details below), gout who presents to [facility] ED 11/5/23 for evaluation of syncope after completing [facility] today. Patient states ~4:30PM today after completing the marathon he kept yawning which was his angina equivalent preceding his MI in 2020, therefore he was prompted to be evaluated.  EKG in the medical tent concerning for frequent PVCs. Patient also had blood drawn which reportedly revealed low electrolytes. Patient had started walking out of the tent to go to the hospital when he collapsed without prodrome and had LOC ~30 seconds. Patient denies any N/V, visual changes, headache, head trauma, diaphoresis, palpitations, chest pain, dizziness, PND, orthopnea, recent sick contacts or travel. Patient had an Echo ~6 months ago which reportedly was normal.       In ED, BP: 148/80, HR: 60s, RR:18, Temp: 98.9F, O2 sat: 97% RA, Fingerstick 83. EKG revealed NSR@70BPM with TWI lead III and TWF AVF. CXR without acute findings. Labs notable for: WBC 15.19 with left shift, BUN/Cr 17/1.3, Mg 1.5, Lactate 2.8 with repeat downtrending 2.4, Trop T 78, CKMB 34.3, CK 1662.    Patient treated with Magnesium sulfate 2g IV x 1 dose.    Patient now admitted to cardiac telemetry for serial cardiac enzymes and further cardiac work-up.           CATH Hx:  @[facility] 8/21/2020: s/p TU pLAD, TU mLAD and TU OM2. Other findings: normal LM, 40% D1 lesion. 30% mLCx lesion, patent mRCA and dRCA stents.  @[facility] 8/20/2020: s/p TU mRCA and TU dRCA.       53 yr old M with PMHx of HTN, hyperlipidemia, MI/CAD s/p PCI with 5 stents@[facility] 8/2020 (cath details below), gout who presents to [facility] ED 11/5/23 for evaluation of syncope after completing [facility] [place] today. Patient states ~4:30PM today after completing the marathon he kept yawning which was his angina equivalent preceding his MI in 2020, therefore he was prompted to be evaluated.  EKG in the medical tent concerning for frequent PVCs. Patient also had blood drawn which reportedly revealed low electrolytes. Patient had started walking out of the tent to go to the hospital when he collapsed without prodrome and had LOC ~30 seconds. Patient denies any N/V, visual changes, headache, head trauma, diaphoresis, palpitations, chest pain, dizziness, PND, orthopnea, recent sick contacts or travel. Patient had an Echo ~6 months ago which reportedly was normal.       In ED, BP: 148/80, HR: 60s, RR:18, Temp: 98.9F, O2 sat: 97% RA, Fingerstick 83. EKG revealed NSR@70BPM with TWI lead III and TWF AVF. CXR without acute findings. Labs notable for: WBC 15.19 with left shift, BUN/Cr 17/1.3, Mg 1.5, Lactate 2.8 with repeat downtrending 2.4, Trop T 78, CKMB 34.3, CK 1662.    Patient treated with Magnesium sulfate 2g IV x 1 dose.    Patient now admitted to cardiac telemetry for serial cardiac enzymes and further cardiac work-up.           CATH Hx:  @[facility] 8/21/2020: s/p TU pLAD, TU mLAD and TU OM2. Other findings: normal LM, 40% D1 lesion. 30% mLCx lesion, patent mRCA and dRCA stents.  @[facility] 8/20/2020: s/p TU mRCA and TU dRCA.       53 yr old M former tobacco use, with PMHx of hyperlipidemia, MI/CAD s/p PCI with 5 stents@[facility] 8/2020 (cath details below), gout who presents to [facility] ED 11/5/23 for evaluation of syncope after completing [facility] today. Patient states ~4:30PM today after completing the marathon he kept yawning which was his angina equivalent preceding his MI in 2020, therefore he was prompted to be evaluated.  EKG in the medical tent concerning for frequent PVCs. Patient also had blood drawn which reportedly revealed low electrolytes. Patient was planning to leave the tent to go to the hospital when he collapsed without prodrome and had LOC ~30 seconds. Patient denies any N/V, visual changes, headache, head trauma, diaphoresis, palpitations, chest pain, dizziness, PND, orthopnea, recent sick contacts or travel. Patient had an Echo ~6 months ago which reportedly was normal.     In ED, BP: 148/80, HR: 60s, RR:18, Temp: 98.9F, O2 sat: 97% RA, Fingerstick 83. EKG revealed NSR@70BPM with TWI lead III and TWF AVF (similar to prior EKGs). CXR without acute findings. Labs notable for: WBC 15.19 with left shift, BUN/Cr 17/1.3, Mg 1.5, Lactate 2.8 with repeat downtrending 2.4, Trop T 78, CKMB 34.3, CK 1662.    Patient treated with Magnesium sulfate 2g IV x 1 dose.    Patient now admitted to cardiac telemetry for serial cardiac enzymes and further cardiac work-up.         CATH Hx:  @[facility] 8/21/2020: s/p TU pLAD, TU mLAD and TU OM2. Other findings: normal LM, 40% D1 lesion. 30% mLCx lesion, patent mRCA and dRCA stents.  @[facility] 8/20/2020: s/p TU mRCA and TU dRCA.       53 yr old M former tobacco use, with PMHx of hyperlipidemia, MI/CAD s/p PCI with 5 stents@[facility] 8/2020 (cath details below), gout who presents to [facility] ED 11/5/23 for evaluation of syncope after completing [facility] today. Patient states ~4:30PM today after completing the marathon he kept yawning which was his angina equivalent preceding his MI in 2020, therefore he was prompted to be evaluated.  EKG in the medical tent concerning for frequent PVCs. Patient also had blood drawn which reportedly revealed low electrolytes. Patient was planning to leave the tent to go to the hospital when he collapsed without prodrome and had LOC ~30 seconds. Patient denies any N/V, visual changes, headache, head trauma, diaphoresis, palpitations, chest pain, dizziness, PND, orthopnea, recent sick contacts or travel. Patient had an Echo ~6 months ago which reportedly was normal.     In ED, BP: 148/80, HR: 60s, RR:18, Temp: 98.9F, O2 sat: 97% RA, Fingerstick 83. EKG revealed NSR@70BPM with TWI lead III and TWF AVF (similar to prior EKGs). CXR without acute findings. Labs notable for: WBC 15.19 with left shift, BUN/Cr 17/1.3, Mg 1.5, Lactate 2.8 with repeat downtrending 2.4, Trop T 78, CKMB 34.3, CK 1662.    Patient treated with Magnesium sulfate 2g IV x 1 dose.    Patient now admitted to cardiac telemetry for serial cardiac enzymes and further cardiac work-up.         CATH Hx:  @[facility] 8/21/2020: s/p TU pLAD, TU mLAD and TU OM2. Other findings: normal LM, 40% D1 lesion. 30% mLCx lesion, patent mRCA and dRCA stents.  @[facility] 8/20/2020: s/p TU mRCA and TU dRCA.       53 yr old M former tobacco use, with PMHx of hyperlipidemia, MI/CAD s/p PCI with 5 stents@[facility] 8/2020 (cath details below), gout who presents to [facility] ED 11/5/23 for evaluation of syncope after completing [facility] today. Patient states ~4:30PM today after completing the marathon he kept yawning which was his angina equivalent preceding his MI in 2020, therefore he was prompted to be evaluated.  EKG in the medical tent concerning for frequent PVCs. Patient also had blood drawn which reportedly revealed low electrolytes. Patient was planning to leave the tent to go to the hospital when he collapsed without prodrome and had LOC ~30 seconds. Patient denies any N/V, visual changes, headache, head trauma, diaphoresis, palpitations, chest pain, dizziness, PND, orthopnea, recent sick contacts or travel. Patient had an Echo ~6 months ago which reportedly was normal.     In ED, BP: 148/80, HR: 60s, RR:18, Temp: 98.9F, O2 sat: 97% RA, Fingerstick 83. EKG revealed NSR@70BPM with TWI lead III and TWF AVF (similar to prior EKGs). CXR without acute findings. Labs notable for: WBC 15.19 with left shift, BUN/Cr 17/1.3, Mg 1.5, Lactate 2.8 with repeat downtrending 2.4, Trop T 78, CKMB 34.3, CK 1662.    Patient treated with Magnesium sulfate 2g IV x 1 dose.    Patient now admitted to cardiac telemetry for serial cardiac enzymes and further cardiac work-up.         CATH Hx:  @[facility] 8/21/2020: s/p TU pLAD, TU mLAD and TU OM2. Other findings: normal LM, 40% D1 lesion. 30% mLCx lesion, patent mRCA and dRCA stents.  @[facility] 8/20/2020: s/p TU mRCA and TU dRCA.

## 2023-11-05 NOTE — H&P ADULT - NSHPOUTPATIENTPROVIDERS_GEN_ALL_CORE
Cardiologist- Dr. Petar Pierre Cardiologist- Dr. Nathanael Salas ([facility]), Emergency contact/Wife- Jeremy [phone] Cardiologist- Dr. Nathanael Salas ([facility]), Emergency contact/Wife- Jeremy [phone] Cardiologist- Dr. Nathanael Salas ([facility]), Emergency contact/Wife- Jeremy [phone]

## 2023-11-05 NOTE — ED PROVIDER NOTE - PHYSICAL EXAMINATION
CONSTITUTIONAL: NAD   SKIN: Normal color and turgor.    HEAD: NC/AT.  EYES: Conjunctiva clear. No conj injection.   ENT: Airway clear. Normal voice.   RESPIRATORY:  Normal work of breathing. Lungs CTAB.  CARDIOVASCULAR:  RRR, S1S2. No M/R/G.      GI:  Abdomen soft, nontender.    MSK: Neck supple.  No LE edema, no thigh/calf tenderness. No joint swelling or ROM limitation.  NEURO: Alert; CN: grossly intact. Speech clear.  Good strength in all ext.

## 2023-11-05 NOTE — H&P ADULT - PROBLEM SELECTOR PLAN 5
WBC 15.19 with left shift, afebrile, CXR unremarkable.   --likely reactive to exercise, will F/U UA.      N: DASH, NPO after midnight  E: Maintain K>4.0 and Mg>2.0  VTE PPx: low risk  Code: Full WBC 15.19 with left shift, afebrile, CXR unremarkable.   --likely reactive to exercise, will F/U UA.    **Lactate also noted to be elevated 2.8 with repeat 2.4. Likely in setting of intense exertion, will F/U in AM.         N: DASH, NPO after midnight  E: Maintain K>4.0 and Mg>2.0  VTE PPx: low risk  Code: Full

## 2023-11-05 NOTE — H&P ADULT - PROBLEM SELECTOR PLAN 3
hx of MI 8/2020, s/p TU m/dRCA, TU p/mLAD and TU OM2.  --continue HOME MEDS: ASA 81mg PO daily and Atorvastatin 80mg PO qhs.

## 2023-11-05 NOTE — ED ADULT NURSE NOTE - CHIEF COMPLAINT QUOTE
Pt c/o syncope s/p completing [facility]. Hx of 5x cardiac stents, denies chest pain. Pt c/o syncope s/p completing [facility]. Hx of 5x cardiac stents, denies chest pain. Pt c/o syncope s/p completing [facility]. Hx of 5x cardiac stents, denies chest pain.

## 2023-11-05 NOTE — H&P ADULT - ASSESSMENT
53 yr old M former tobacco use, with PMHx of hyperlipidemia, MI/CAD s/p PCI with 5 stents@[facility] 8/2020 (cath details below), gout who presents to [facility] ED 11/5/23 for evaluation of syncope after completing [facility] today,  admitted to cardiac telemetry for serial cardiac enzymes and further cardiac work-up.  53 yr old M former tobacco use, with PMHx of hyperlipidemia, MI/CAD s/p PCI with 5 stents@[facility] 8/2020 (cath details below), gout who presents to [facility] ED 11/5/23 for evaluation of syncope after completing [facility] today,  admitted to cardiac telemetry for serial cardiac enzymes and further cardiac work-up.  53 yr old M former tobacco use, with PMHx of hyperlipidemia, MI/CAD s/p PCI with 5 stents@[facility] 8/2020 (cath details below), gout who presents to [facility] ED 11/5/23 for evaluation of syncope after completing [facility] [place] today,  admitted to cardiac telemetry for serial cardiac enzymes and further cardiac work-up.

## 2023-11-05 NOTE — ED ADULT TRIAGE NOTE - CHIEF COMPLAINT QUOTE
Pt c/o syncope s/p completing [facility]. Hx of 5x cardiac stents, denies chest pain. Pt c/o syncope s/p completing [facility]. Hx of 5x cardiac stents, denies chest pain. Pt c/o syncope s/p completing [facility]. Hx of 5x cardiac stents, denies chest pain.

## 2023-11-05 NOTE — ED PROVIDER NOTE - CLINICAL SUMMARY MEDICAL DECISION MAKING FREE TEXT BOX
Syncope after completing marathon; noted to be hypokalemic after race on POCT and EKG had frequent PVCs.  Hx of CAD with 5 stents three yrs ago.  Not having CP.  Concern for electrolyte abnormality as potential contributor to syncope, possible cardiac arrhythmia. Essentially asymptomatic and VS WNL in ED. On monitor in ED only has rare PVC.   Plan: keep on cardiac monitor, check lytes and cardiac enzymes including CK to eval for rhabdo.  Low threshold for cardiac consultation.

## 2023-11-05 NOTE — H&P ADULT - PROBLEM SELECTOR PLAN 2
Trop T 78-->62, CKMB 34.3-->33.7, CK 1662-->1826.    --pt chest pain free, Trop T/CKMB downtrending and CK uptrending likely in setting of exercise induced rhabdomyolysis.   --will continue to trend.  --obtain TTE in AM.

## 2023-11-06 ENCOUNTER — TRANSCRIPTION ENCOUNTER (OUTPATIENT)
Age: 53
End: 2023-11-06

## 2023-11-06 VITALS
SYSTOLIC BLOOD PRESSURE: 158 MMHG | OXYGEN SATURATION: 96 % | DIASTOLIC BLOOD PRESSURE: 93 MMHG | HEART RATE: 48 BPM | RESPIRATION RATE: 18 BRPM | TEMPERATURE: 98 F

## 2023-11-06 DIAGNOSIS — D72.829 ELEVATED WHITE BLOOD CELL COUNT, UNSPECIFIED: ICD-10-CM

## 2023-11-06 DIAGNOSIS — R79.89 OTHER SPECIFIED ABNORMAL FINDINGS OF BLOOD CHEMISTRY: ICD-10-CM

## 2023-11-06 DIAGNOSIS — I25.10 ATHEROSCLEROTIC HEART DISEASE OF NATIVE CORONARY ARTERY WITHOUT ANGINA PECTORIS: ICD-10-CM

## 2023-11-06 DIAGNOSIS — R55 SYNCOPE AND COLLAPSE: ICD-10-CM

## 2023-11-06 DIAGNOSIS — M10.9 GOUT, UNSPECIFIED: ICD-10-CM

## 2023-11-06 LAB
A1C WITH ESTIMATED AVERAGE GLUCOSE RESULT: 5.6 % — SIGNIFICANT CHANGE UP (ref 4–5.6)
ANION GAP SERPL CALC-SCNC: 13 MMOL/L — SIGNIFICANT CHANGE UP (ref 5–17)
BUN SERPL-MCNC: 17 MG/DL — SIGNIFICANT CHANGE UP (ref 7–23)
CALCIUM SERPL-MCNC: 9.1 MG/DL — SIGNIFICANT CHANGE UP (ref 8.4–10.5)
CHLORIDE SERPL-SCNC: 104 MMOL/L — SIGNIFICANT CHANGE UP (ref 96–108)
CHOLEST SERPL-MCNC: 106 MG/DL — SIGNIFICANT CHANGE UP
CK MB CFR SERPL CALC: 53.9 NG/ML — HIGH (ref 0–6.7)
CK SERPL-CCNC: 3809 U/L — HIGH (ref 30–200)
CO2 SERPL-SCNC: 24 MMOL/L — SIGNIFICANT CHANGE UP (ref 22–31)
CREAT ?TM UR-MCNC: 181 MG/DL — SIGNIFICANT CHANGE UP
CREAT SERPL-MCNC: 0.97 MG/DL — SIGNIFICANT CHANGE UP (ref 0.5–1.3)
EGFR: 94 ML/MIN/1.73M2 — SIGNIFICANT CHANGE UP
ESTIMATED AVERAGE GLUCOSE: 114 MG/DL — SIGNIFICANT CHANGE UP (ref 68–114)
GLUCOSE SERPL-MCNC: 103 MG/DL — HIGH (ref 70–99)
HCT VFR BLD CALC: 42.4 % — SIGNIFICANT CHANGE UP (ref 39–50)
HDLC SERPL-MCNC: 55 MG/DL — SIGNIFICANT CHANGE UP
HGB BLD-MCNC: 14.9 G/DL — SIGNIFICANT CHANGE UP (ref 13–17)
LACTATE SERPL-SCNC: 1 MMOL/L — SIGNIFICANT CHANGE UP (ref 0.5–2)
LIPID PNL WITH DIRECT LDL SERPL: 39 MG/DL — SIGNIFICANT CHANGE UP
MAGNESIUM SERPL-MCNC: 2.2 MG/DL — SIGNIFICANT CHANGE UP (ref 1.6–2.6)
MCHC RBC-ENTMCNC: 31.4 PG — SIGNIFICANT CHANGE UP (ref 27–34)
MCHC RBC-ENTMCNC: 35.1 GM/DL — SIGNIFICANT CHANGE UP (ref 32–36)
MCV RBC AUTO: 89.5 FL — SIGNIFICANT CHANGE UP (ref 80–100)
NON HDL CHOLESTEROL: 51 MG/DL — SIGNIFICANT CHANGE UP
NRBC # BLD: 0 /100 WBCS — SIGNIFICANT CHANGE UP (ref 0–0)
OSMOLALITY UR: 614 MOSM/KG — SIGNIFICANT CHANGE UP (ref 300–900)
PLATELET # BLD AUTO: 174 K/UL — SIGNIFICANT CHANGE UP (ref 150–400)
POTASSIUM SERPL-MCNC: 3.7 MMOL/L — SIGNIFICANT CHANGE UP (ref 3.5–5.3)
POTASSIUM SERPL-SCNC: 3.7 MMOL/L — SIGNIFICANT CHANGE UP (ref 3.5–5.3)
POTASSIUM UR-SCNC: 86 MMOL/L — SIGNIFICANT CHANGE UP
PROT ?TM UR-MCNC: 20 MG/DL — HIGH (ref 0–12)
PROT/CREAT UR-RTO: 0.1 RATIO — SIGNIFICANT CHANGE UP (ref 0–0.2)
RBC # BLD: 4.74 M/UL — SIGNIFICANT CHANGE UP (ref 4.2–5.8)
RBC # FLD: 12.4 % — SIGNIFICANT CHANGE UP (ref 10.3–14.5)
SODIUM SERPL-SCNC: 141 MMOL/L — SIGNIFICANT CHANGE UP (ref 135–145)
SODIUM UR-SCNC: 72 MMOL/L — SIGNIFICANT CHANGE UP
T4 AB SER-ACNC: 7.08 UG/DL — SIGNIFICANT CHANGE UP (ref 4.5–11.7)
TRIGL SERPL-MCNC: 58 MG/DL — SIGNIFICANT CHANGE UP
TSH SERPL-MCNC: 0.54 UIU/ML — SIGNIFICANT CHANGE UP (ref 0.27–4.2)
UUN UR-MCNC: 932 MG/DL — SIGNIFICANT CHANGE UP
WBC # BLD: 10.32 K/UL — SIGNIFICANT CHANGE UP (ref 3.8–10.5)
WBC # FLD AUTO: 10.32 K/UL — SIGNIFICANT CHANGE UP (ref 3.8–10.5)

## 2023-11-06 PROCEDURE — 96374 THER/PROPH/DIAG INJ IV PUSH: CPT

## 2023-11-06 PROCEDURE — 36415 COLL VENOUS BLD VENIPUNCTURE: CPT

## 2023-11-06 PROCEDURE — 80061 LIPID PANEL: CPT

## 2023-11-06 PROCEDURE — 84300 ASSAY OF URINE SODIUM: CPT

## 2023-11-06 PROCEDURE — 80053 COMPREHEN METABOLIC PANEL: CPT

## 2023-11-06 PROCEDURE — 99239 HOSP IP/OBS DSCHRG MGMT >30: CPT

## 2023-11-06 PROCEDURE — 71045 X-RAY EXAM CHEST 1 VIEW: CPT

## 2023-11-06 PROCEDURE — 84540 ASSAY OF URINE/UREA-N: CPT

## 2023-11-06 PROCEDURE — 82962 GLUCOSE BLOOD TEST: CPT

## 2023-11-06 PROCEDURE — 83735 ASSAY OF MAGNESIUM: CPT

## 2023-11-06 PROCEDURE — 84133 ASSAY OF URINE POTASSIUM: CPT

## 2023-11-06 PROCEDURE — 85025 COMPLETE CBC W/AUTO DIFF WBC: CPT

## 2023-11-06 PROCEDURE — 99285 EMERGENCY DEPT VISIT HI MDM: CPT | Mod: 25

## 2023-11-06 PROCEDURE — 84484 ASSAY OF TROPONIN QUANT: CPT

## 2023-11-06 PROCEDURE — 83935 ASSAY OF URINE OSMOLALITY: CPT

## 2023-11-06 PROCEDURE — 82570 ASSAY OF URINE CREATININE: CPT

## 2023-11-06 PROCEDURE — 85027 COMPLETE CBC AUTOMATED: CPT

## 2023-11-06 PROCEDURE — 85730 THROMBOPLASTIN TIME PARTIAL: CPT

## 2023-11-06 PROCEDURE — 82550 ASSAY OF CK (CPK): CPT

## 2023-11-06 PROCEDURE — 82553 CREATINE MB FRACTION: CPT

## 2023-11-06 PROCEDURE — 83605 ASSAY OF LACTIC ACID: CPT

## 2023-11-06 PROCEDURE — 93306 TTE W/DOPPLER COMPLETE: CPT | Mod: 26

## 2023-11-06 PROCEDURE — 80048 BASIC METABOLIC PNL TOTAL CA: CPT

## 2023-11-06 PROCEDURE — 83036 HEMOGLOBIN GLYCOSYLATED A1C: CPT

## 2023-11-06 PROCEDURE — 84443 ASSAY THYROID STIM HORMONE: CPT

## 2023-11-06 PROCEDURE — 85610 PROTHROMBIN TIME: CPT

## 2023-11-06 PROCEDURE — 84436 ASSAY OF TOTAL THYROXINE: CPT

## 2023-11-06 PROCEDURE — 84156 ASSAY OF PROTEIN URINE: CPT

## 2023-11-06 PROCEDURE — C8929: CPT

## 2023-11-06 RX ORDER — SODIUM CHLORIDE 9 MG/ML
1000 INJECTION INTRAMUSCULAR; INTRAVENOUS; SUBCUTANEOUS
Refills: 0 | Status: DISCONTINUED | OUTPATIENT
Start: 2023-11-06 | End: 2023-11-06

## 2023-11-06 RX ORDER — POTASSIUM CHLORIDE 20 MEQ
40 PACKET (EA) ORAL ONCE
Refills: 0 | Status: COMPLETED | OUTPATIENT
Start: 2023-11-06 | End: 2023-11-06

## 2023-11-06 RX ORDER — SODIUM CHLORIDE 9 MG/ML
250 INJECTION INTRAMUSCULAR; INTRAVENOUS; SUBCUTANEOUS ONCE
Refills: 0 | Status: COMPLETED | OUTPATIENT
Start: 2023-11-06 | End: 2023-11-06

## 2023-11-06 RX ORDER — INFLUENZA VIRUS VACCINE 15; 15; 15; 15 UG/.5ML; UG/.5ML; UG/.5ML; UG/.5ML
0.5 SUSPENSION INTRAMUSCULAR ONCE
Refills: 0 | Status: DISCONTINUED | OUTPATIENT
Start: 2023-11-06 | End: 2023-11-06

## 2023-11-06 RX ADMIN — SODIUM CHLORIDE 100 MILLILITER(S): 9 INJECTION INTRAMUSCULAR; INTRAVENOUS; SUBCUTANEOUS at 05:13

## 2023-11-06 RX ADMIN — ATORVASTATIN CALCIUM 80 MILLIGRAM(S): 80 TABLET, FILM COATED ORAL at 00:56

## 2023-11-06 RX ADMIN — SODIUM CHLORIDE 250 MILLILITER(S): 9 INJECTION INTRAMUSCULAR; INTRAVENOUS; SUBCUTANEOUS at 13:44

## 2023-11-06 RX ADMIN — Medication 100 MILLIGRAM(S): at 00:56

## 2023-11-06 RX ADMIN — Medication 40 MILLIEQUIVALENT(S): at 10:07

## 2023-11-06 RX ADMIN — Medication 81 MILLIGRAM(S): at 13:35

## 2023-11-06 RX ADMIN — SODIUM CHLORIDE 500 MILLILITER(S): 9 INJECTION INTRAMUSCULAR; INTRAVENOUS; SUBCUTANEOUS at 04:39

## 2023-11-06 NOTE — PATIENT PROFILE ADULT - FOOD INSECURITY
Spoke to pt and updated on Holter result. No new orders. Pt to complete Echo as scheduled. Instructed that caffeine and stress/anxiety will bring palpitations on and to report new or worsening sx kalen chest pain, etc. Pt states understanding and agrees with plan.    no

## 2023-11-06 NOTE — DISCHARGE NOTE PROVIDER - NSDCCPCAREPLAN_GEN_ALL_CORE_FT
PRINCIPAL DISCHARGE DIAGNOSIS  Diagnosis: Syncope  Assessment and Plan of Treatment:       SECONDARY DISCHARGE DIAGNOSES  Diagnosis: HLD (hyperlipidemia)  Assessment and Plan of Treatment: Please continue Atorvastatin 80 at bedtime to keep your cholesterol low. High cholesterol contributes to heart disease.      Diagnosis: Gout  Assessment and Plan of Treatment: Please continue taking Allopurinol 100mg at bedtime. Please follow up with your PCP for continued management.     PRINCIPAL DISCHARGE DIAGNOSIS  Diagnosis: Syncope  Assessment and Plan of Treatment: You experienced a syncopal episode or loss of consciousness after runniung the marathon. Your vital signs have been stable, your glucose or blood sugar levels were normal as well. You had elevated creatinine kinase and CKMB levels casued by a condition called Rhabdomylosis. Rhabdomyolysis is when muscle tissue gets damaged and substances from inside the muscle cells leak out into the blood. Extreme physical exertion, including running a marathon, can cause this condition. We gave you plenty of hydration through your IV and repeated blood work to monitor your levels of creatinie kinase and CKMB, which have been downtrending.   Please continue to drink plenty of fluids after discharge. If you experience another syncopcal episode, chest pain, shortness of breath please visit the ED for evaluation.   please continue to take your aspirin 81 mg daily and follow up with Dr. Salas in 1-2 weeks.      SECONDARY DISCHARGE DIAGNOSES  Diagnosis: HLD (hyperlipidemia)  Assessment and Plan of Treatment: Please continue Atorvastatin 80 at bedtime to keep your cholesterol low. High cholesterol contributes to heart disease.      Diagnosis: Gout  Assessment and Plan of Treatment: Please continue taking Allopurinol 100mg at bedtime. Please follow up with your PCP for continued management.     PRINCIPAL DISCHARGE DIAGNOSIS  Diagnosis: Syncope  Assessment and Plan of Treatment: You experienced a syncopal episode or loss of consciousness after runniung the marathon. Your vital signs have been stable, your glucose or blood sugar levels were normal as well. You had elevated creatinine kinase and CKMB levels casued by a condition called Rhabdomylosis. Rhabdomyolysis is when muscle tissue gets damaged and substances from inside the muscle cells leak out into the blood. Extreme physical exertion, including running a marathon, can cause this condition. We gave you plenty of hydration through your IV and repeated blood work to monitor your levels of creatinie kinase and CKMB, which have been downtrending.   Please continue to drink plenty of fluids after discharge. If you experience another syncopcal episode, chest pain, shortness of breath please visit the ED for evaluation.   Please continue to take your aspirin 81 mg daily.   Follow up with Dr. Salas in 1-2 weeks.      SECONDARY DISCHARGE DIAGNOSES  Diagnosis: HLD (hyperlipidemia)  Assessment and Plan of Treatment: Please continue Atorvastatin 80 at bedtime to keep your cholesterol low. High cholesterol contributes to heart disease.      Diagnosis: Gout  Assessment and Plan of Treatment: Please continue taking Allopurinol 100mg at bedtime. Please follow up with your PCP for continued management.

## 2023-11-06 NOTE — PATIENT PROFILE ADULT - FALL HARM RISK - UNIVERSAL INTERVENTIONS
Bed in lowest position, wheels locked, appropriate side rails in place/Call bell, personal items and telephone in reach/Instruct patient to call for assistance before getting out of bed or chair/Non-slip footwear when patient is out of bed/[place] to call system/Physically safe environment - no spills, clutter or unnecessary equipment/Purposeful Proactive Rounding/Room/bathroom lighting operational, light cord in reach Bed in lowest position, wheels locked, appropriate side rails in place/Call bell, personal items and telephone in reach/Instruct patient to call for assistance before getting out of bed or chair/Non-slip footwear when patient is out of bed/[place] to call system/Physically safe environment - no spills, clutter or unnecessary equipment/Purposeful Proactive Rounding/Room/bathroom lighting operational, light cord in reach Bed in lowest position, wheels locked, appropriate side rails in place/Call bell, personal items and telephone in reach/Instruct patient to call for assistance before getting out of bed or chair/Non-slip footwear when patient is out of bed/[place] to call system/Physically safe environment - no spills, clutter or unnecessary equipment/Purposeful Proactive Rounding/Room/bathroom lighting operational, light cord in reach

## 2023-11-06 NOTE — DISCHARGE NOTE PROVIDER - NSDCMRMEDTOKEN_GEN_ALL_CORE_FT
allopurinol 100 mg oral tablet: 1 tab(s) orally once a day (at bedtime)  Aspirin Enteric Coated 81 mg oral delayed release tablet: 1 tab(s) orally once a day  atorvastatin 80 mg oral tablet: 1 tab(s) orally once a day (at bedtime)

## 2023-11-06 NOTE — DISCHARGE NOTE PROVIDER - CARE PROVIDERS DIRECT ADDRESSES
,[email or web address] ,[email or web address] ,[email or web address]

## 2023-11-06 NOTE — DISCHARGE NOTE PROVIDER - HOSPITAL COURSE
******INCOMPLETE*******    53 yr old M former tobacco use, with PMHx of hyperlipidemia, MI/CAD s/p PCI with 5 stents@[facility] 8/2020 (cath details below), gout who presents to [facility] ED 11/5/23 for evaluation of syncope [facility] [place] [facility]. Patient states ~4:30PM today after completing the marathon he kept yawning which was his angina equivalent preceding his MI in 2020, therefore he was prompted to be evaluated.  EKG in the medical tent concerning for frequent PVCs. Patient also had blood drawn which reportedly revealed low electrolytes. Patient was planning to leave the tent to go to the hospital when he collapsed without prodrome and had LOC ~30 seconds. Patient denies any N/V, visual changes, headache, head trauma, diaphoresis, palpitations, chest pain, dizziness, PND, orthopnea, recent sick contacts or travel. Patient had an Echo ~6 months ago which reportedly was normal. In ED, BP: 148/80, HR: 60s, RR:18, Temp: 98.9F, O2 sat: 97% RA, Fingerstick 83. EKG revealed NSR@70BPM with TWI lead III and TWF AVF (similar to prior EKGs). CXR without acute findings. Labs notable for: WBC 15.19 with left shift, BUN/Cr 17/1.3, Mg 1.5, Lactate 2.8 with repeat downtrending 2.4, Trop T 78, CKMB 34.3, CK 1662. Patient treated with Magnesium sulfate 2g IV x 1 dose. Patient now admitted to cardiac telemetry for serial cardiac enzymes and further cardiac work-up.     CATH Hx:  @[facility] 8/21/2020: s/p TU pLAD, TU mLAD and TU OM2. Other findings: normal LM, 40% D1 lesion. 30% mLCx lesion, patent mRCA and dRCA stents.  @[facility] 8/20/2020: s/p TU mRCA and TU dRCA.    TTE 11/6/23: _______________________________________    Pt is asymptomatic at this time and denies chest pain, SOB, TEE, palpitations, dizziness, LOC, N/V, diaphoresis, orthopnea/PND, and leg swelling. Pt able to ambulate and void without complication. VSS. Labs and telemetry reviewed.  Pt is a candidate for discharge per Dr. Barnhart. Pt given appropriate discharge instructions, pt states they have an appropriate amount of their previous home meds unchanged from this visit at home, and any new medications were sent to their pharmacy. Pt instructed to f/u with Dr. Salas in 1-2 weeks.    DISCHARGE MEDS:   -ASA 81mg QD   -Atorvastatin 80mg QHS   -Allopurinol 100mg QD ******INCOMPLETE*******    53 yr old M former tobacco use, with PMHx of hyperlipidemia, MI/CAD s/p PCI with 5 stents@[facility] 8/2020 (cath details below), gout who presents to [facility] ED 11/5/23 for evaluation of syncope after [facility] [place] [facility]. Patient states ~4:30PM today after completing the marathon he kept yawning which was his angina equivalent preceding his MI in 2020, therefore he was prompted to be evaluated.  EKG in the medical tent concerning for frequent PVCs. Patient also had blood drawn which reportedly revealed low electrolytes. Patient was planning to leave the tent to go to the hospital when he collapsed without prodrome and had LOC ~30 seconds. Patient denies any N/V, visual changes, headache, head trauma, diaphoresis, palpitations, chest pain, dizziness, PND, orthopnea, recent sick contacts or travel. Patient had an Echo ~6 months ago which reportedly was normal. In ED, BP: 148/80, HR: 60s, RR:18, Temp: 98.9F, O2 sat: 97% RA, Fingerstick 83. EKG revealed NSR@70BPM with TWI lead III and TWF AVF (similar to prior EKGs). CXR without acute findings. Labs notable for: WBC 15.19 with left shift, BUN/Cr 17/1.3, Mg 1.5, Lactate 2.8 with repeat downtrending 2.4, Trop T 78, CKMB 34.3, CK 1662. Patient treated with Magnesium sulfate 2g IV x 1 dose. Patient now admitted to cardiac telemetry for serial cardiac enzymes and further cardiac work-up.     CATH Hx:  @[facility] 8/21/2020: s/p TU pLAD, TU mLAD and TU OM2. Other findings: normal LM, 40% D1 lesion. 30% mLCx lesion, patent mRCA and dRCA stents.  @[facility] 8/20/2020: s/p TU mRCA and TU dRCA.    TTE 11/6/23: _______________________________________    Pt is asymptomatic at this time and denies chest pain, SOB, TEE, palpitations, dizziness, LOC, N/V, diaphoresis, orthopnea/PND, and leg swelling. Pt able to ambulate and void without complication. VSS. Labs and telemetry reviewed.  Pt is a candidate for discharge per Dr. Barnhart. Pt given appropriate discharge instructions, pt states they have an appropriate amount of their previous home meds unchanged from this visit at home, and any new medications were sent to their pharmacy. Pt instructed to f/u with Dr. Salas in 1-2 weeks.    DISCHARGE MEDS:   -ASA 81mg QD   -Atorvastatin 80mg QHS   -Allopurinol 100mg QD ******INCOMPLETE*******    53 yr old M former tobacco use, with PMHx of hyperlipidemia, MI/CAD s/p PCI with 5 stents@[facility] 8/2020 (cath details below), gout who presents to [facility] ED 11/5/23 for evaluation of syncope after completing [facility] today. Patient states ~4:30PM today after completing the marathon he kept yawning which was his angina equivalent preceding his MI in 2020, therefore he was prompted to be evaluated.  EKG in the medical tent concerning for frequent PVCs. Patient also had blood drawn which reportedly revealed low electrolytes. Patient was planning to leave the tent to go to the hospital when he collapsed without prodrome and had LOC ~30 seconds. Patient denies any N/V, visual changes, headache, head trauma, diaphoresis, palpitations, chest pain, dizziness, PND, orthopnea, recent sick contacts or travel. Patient had an Echo ~6 months ago which reportedly was normal. In ED, BP: 148/80, HR: 60s, RR:18, Temp: 98.9F, O2 sat: 97% RA, Fingerstick 83. EKG revealed NSR@70BPM with TWI lead III and TWF AVF (similar to prior EKGs). CXR without acute findings. Labs notable for: WBC 15.19 with left shift, BUN/Cr 17/1.3, Mg 1.5, Lactate 2.8 with repeat downtrending 2.4, Trop T 78, CKMB 34.3, CK 1662. Patient treated with Magnesium sulfate 2g IV x 1 dose. Patient now admitted to cardiac telemetry for serial cardiac enzymes and further cardiac work-up.     CATH Hx:  @[facility] 8/21/2020: s/p TU pLAD, TU mLAD and TU OM2. Other findings: normal LM, 40% D1 lesion. 30% mLCx lesion, patent mRCA and dRCA stents.  @[facility] 8/20/2020: s/p TU mRCA and TU dRCA.    TTE 11/6/23: _______________________________________    Pt is asymptomatic at this time and denies chest pain, SOB, TEE, palpitations, dizziness, LOC, N/V, diaphoresis, orthopnea/PND, and leg swelling. Pt able to ambulate and void without complication. VSS. Labs and telemetry reviewed.  Pt is a candidate for discharge per Dr. Barnhart. Pt given appropriate discharge instructions, pt states they have an appropriate amount of their previous home meds unchanged from this visit at home, and any new medications were sent to their pharmacy. Pt instructed to f/u with Dr. Salas in 1-2 weeks.    DISCHARGE MEDS:   -ASA 81mg QD   -Atorvastatin 80mg QHS   -Allopurinol 100mg QD 53 yr old M former tobacco use, with PMHx of hyperlipidemia, MI/CAD s/p PCI with 5 stents@[facility] 8/2020 (cath details below), gout who presents to [facility] ED 11/5/23 for evaluation of syncope after completing [facility] today. Patient states ~4:30PM today after completing the marathon he kept yawning which was his angina equivalent preceding his MI in 2020, therefore he was prompted to be evaluated.  EKG in the medical tent concerning for frequent PVCs. Patient also had blood drawn which reportedly revealed low electrolytes. Patient was planning to leave the tent to go to the hospital when he collapsed without prodrome and had LOC ~30 seconds. Patient denies any N/V, visual changes, headache, head trauma, diaphoresis, palpitations, chest pain, dizziness, PND, orthopnea, recent sick contacts or travel. Patient had an Echo ~6 months ago which reportedly was normal. In ED, BP: 148/80, HR: 60s, RR:18, Temp: 98.9F, O2 sat: 97% RA, Fingerstick 83. EKG revealed NSR@70BPM with TWI lead III and TWF AVF (similar to prior EKGs). CXR without acute findings. Labs notable for: WBC 15.19 with left shift, BUN/Cr 17/1.3, Mg 1.5, Lactate 2.8 with repeat downtrending 2.4, Trop T 78, CKMB 34.3, CK 1662. Patient treated with Magnesium sulfate 2g IV x 1 dose. Patient now admitted to cardiac telemetry for serial cardiac enzymes and further cardiac work-up.     CATH Hx:  @[facility] 8/21/2020: s/p TU pLAD, TU mLAD and TU OM2. Other findings: normal LM, 40% D1 lesion. 30% mLCx lesion, patent mRCA and dRCA stents.  @[facility] 8/20/2020: s/p TU mRCA and TU dRCA.    TTE 11/6/23: normal LVSF (EF 60-65%), dilated RV, mildly dilated LA, no valve disease, dilated IVC.     Pt is asymptomatic at this time and denies chest pain, SOB, TEE, palpitations, dizziness, LOC, N/V, diaphoresis, orthopnea/PND, and leg swelling. Pt able to ambulate and void without complication. VSS. Labs and telemetry reviewed.  Pt is a candidate for discharge per Dr. Barnhart. Pt given appropriate discharge instructions, pt states they have an appropriate amount of their previous home meds unchanged from this visit at home, and any new medications were sent to their pharmacy. Pt instructed to f/u with Dr. Salas in 1-2 weeks.      DISCHARGE MEDS:   -ASA 81mg QD   -Atorvastatin 80mg QHS   -Allopurinol 100mg QD 53 yr old M former tobacco use, with PMHx of hyperlipidemia, MI/CAD s/p PCI with 5 stents@[facility] 8/2020 (cath details below), gout who presents to [facility] ED 11/5/23 for evaluation of syncope after completing [facility] today. Patient states ~4:30PM today after completing the marathon he kept yawning which was his angina equivalent preceding his MI in 2020, therefore he was prompted to be evaluated.  EKG in the medical tent concerning for frequent PVCs. Patient also had blood drawn which reportedly revealed low electrolytes. Patient was planning to leave the tent to go to the hospital when he collapsed without prodrome and had LOC ~30 seconds. Patient denies any N/V, visual changes, headache, head trauma, diaphoresis, palpitations, chest pain, dizziness, PND, orthopnea, recent sick contacts or travel. Patient had an Echo ~6 months ago which reportedly was normal. In ED, BP: 148/80, HR: 60s, RR:18, Temp: 98.9F, O2 sat: 97% RA, Fingerstick 83. EKG revealed NSR@70BPM with TWI lead III and TWF AVF (similar to prior EKGs). CXR without acute findings. Labs notable for: WBC 15.19 with left shift, BUN/Cr 17/1.3, Mg 1.5, Lactate 2.8 with repeat downtrending 2.4, Trop T 78, CKMB 34.3, CK 1662. Patient treated with Magnesium sulfate 2g IV x 1 dose. Patient now admitted to cardiac telemetry for serial cardiac enzymes and further cardiac work-up.     CATH Hx:  @[facility] 8/21/2020: s/p TU pLAD, TU mLAD and TU OM2. Other findings: normal LM, 40% D1 lesion. 30% mLCx lesion, patent mRCA and dRCA stents.  @[facility] 8/20/2020: s/p TU mRCA and TU dRCA.    TTE 11/6/23: normal LVSF (EF 60-65%), dilated RV, mildly dilated LA, no valve disease, dilated IVC.     Pt is asymptomatic at this time and denies chest pain, SOB, TEE, palpitations, dizziness, LOC, N/V, diaphoresis, orthopnea/PND, and leg swelling. Pt able to ambulate and void without complication. VSS. Labs and telemetry reviewed.  Pt is a candidate for discharge per Dr. Barnhart. Pt given appropriate discharge instructions, pt states they have an appropriate amount of their previous home meds unchanged from this visit at home, and any new medications were sent to their pharmacy. Pt instructed to f/u with Dr. Salas in 1-2 weeks.      DISCHARGE MEDS:   -ASA 81mg QD   -Atorvastatin 80mg QHS   -Allopurinol 100mg QD 53 yr old M former tobacco use, with PMHx of hyperlipidemia, MI/CAD s/p PCI with 5 stents@[facility] 8/2020 (cath details below), gout who presents to [facility] ED 11/5/23 for evaluation of syncope after completing [facility] [place] today. Patient states ~4:30PM today after completing the marathon he kept yawning which was his angina equivalent preceding his MI in 2020, therefore he was prompted to be evaluated.  EKG in the medical tent concerning for frequent PVCs. Patient also had blood drawn which reportedly revealed low electrolytes. Patient was planning to leave the tent to go to the hospital when he collapsed without prodrome and had LOC ~30 seconds. Patient denies any N/V, visual changes, headache, head trauma, diaphoresis, palpitations, chest pain, dizziness, PND, orthopnea, recent sick contacts or travel. Patient had an Echo ~6 months ago which reportedly was normal. In ED, BP: 148/80, HR: 60s, RR:18, Temp: 98.9F, O2 sat: 97% RA, Fingerstick 83. EKG revealed NSR@70BPM with TWI lead III and TWF AVF (similar to prior EKGs). CXR without acute findings. Labs notable for: WBC 15.19 with left shift, BUN/Cr 17/1.3, Mg 1.5, Lactate 2.8 with repeat downtrending 2.4, Trop T 78, CKMB 34.3, CK 1662. Patient treated with Magnesium sulfate 2g IV x 1 dose. Patient now admitted to cardiac telemetry for serial cardiac enzymes and further cardiac work-up.     CATH Hx:  @[facility] 8/21/2020: s/p TU pLAD, TU mLAD and TU OM2. Other findings: normal LM, 40% D1 lesion. 30% mLCx lesion, patent mRCA and dRCA stents.  @[facility] 8/20/2020: s/p TU mRCA and TU dRCA.    TTE 11/6/23: normal LVSF (EF 60-65%), dilated RV, mildly dilated LA, no valve disease, dilated IVC.     Pt is asymptomatic at this time and denies chest pain, SOB, TEE, palpitations, dizziness, LOC, N/V, diaphoresis, orthopnea/PND, and leg swelling. Pt able to ambulate and void without complication. VSS. Labs and telemetry reviewed.  Pt is a candidate for discharge per Dr. Barnhart. Pt given appropriate discharge instructions, pt states they have an appropriate amount of their previous home meds unchanged from this visit at home, and any new medications were sent to their pharmacy. Pt instructed to f/u with Dr. Salas in 1-2 weeks.      DISCHARGE MEDS:   -ASA 81mg QD   -Atorvastatin 80mg QHS   -Allopurinol 100mg QD

## 2023-11-06 NOTE — DISCHARGE NOTE NURSING/CASE MANAGEMENT/SOCIAL WORK - NSDCPEFALRISK_GEN_ALL_CORE
For information on Fall & Injury Prevention, visit: [email or web address] OR  [email or web address] OR  https://www.cdc.gov/steadi/patient.html For information on Fall & Injury Prevention, visit: [email or web address]://[email or web address]-[email or web address]-mobility OR  [email or web address]://[email or web address]-tips-[email or web address]-avoid-injury OR  https://www.cdc.gov/steadi/patient.html For information on Fall & Injury Prevention, visit: [email or web address]://www.[facility]/[email or web address]-[email or web address]-maintains-health-[email or web address]-mobility OR  https://www.[facility]/news/fall-prevention-tips-to-avoid-injury OR  https://www.cdc.gov/steadi/patient.html

## 2023-11-06 NOTE — DISCHARGE NOTE NURSING/CASE MANAGEMENT/SOCIAL WORK - PATIENT PORTAL LINK FT
You can access the FollowMyHealth Patient Portal offered by [facility] by registering at the following website: [email or web address]. By joining "[employer]’s FollowMyHealth portal, you will also be able to view your health information using other applications (apps) compatible with our system. You can access the FollowMyHealth Patient Portal offered by [facility] by registering at the following website: [email or web address]. By joining [employer]’s FollowMyHealth portal, you will also be able to view your health information using other applications (apps) compatible with our system. You can access the FollowMyHealth Patient Portal offered by [facility] by registering at the following website: [email or web address]. By joining [employer]’s FollowMyHealth portal, you will also be able to view your health information using other applications (apps) compatible with our system.

## 2023-11-06 NOTE — DISCHARGE NOTE PROVIDER - CARE PROVIDER_API CALL
Nathanael Salas  Cardiovascular Disease  [address] [place] [address]  Phone: [phone]  Fax: [phone]  Established Patient  Follow Up Time: 2 weeks   Nathanael Salas  Cardiovascular Disease  [address] [place] [address]  Phone: [phone]  Fax: [phone]  Established Patient  Follow Up Time: 2 weeks   Nathanael Salas  Cardiovascular Disease  [address] [place] [address]  Phone: [phone]  Fax: [phone]  Established Patient  Follow Up Time: 2 weeks

## 2023-11-08 ENCOUNTER — TRANSCRIPTION ENCOUNTER (OUTPATIENT)
Age: 53
End: 2023-11-08

## 2023-11-10 DIAGNOSIS — M10.9 GOUT, UNSPECIFIED: ICD-10-CM

## 2023-11-10 DIAGNOSIS — R55 SYNCOPE AND COLLAPSE: ICD-10-CM

## 2023-11-10 DIAGNOSIS — E78.5 HYPERLIPIDEMIA, UNSPECIFIED: ICD-10-CM

## 2023-11-10 DIAGNOSIS — Z95.5 PRESENCE OF CORONARY ANGIOPLASTY IMPLANT AND GRAFT: ICD-10-CM

## 2023-11-10 DIAGNOSIS — I25.10 ATHEROSCLEROTIC HEART DISEASE OF NATIVE CORONARY ARTERY WITHOUT ANGINA PECTORIS: ICD-10-CM

## 2023-11-10 DIAGNOSIS — M62.82 RHABDOMYOLYSIS: ICD-10-CM

## 2023-11-10 DIAGNOSIS — I25.2 OLD MYOCARDIAL INFARCTION: ICD-10-CM

## 2023-11-10 DIAGNOSIS — Z79.82 LONG TERM (CURRENT) USE OF ASPIRIN: ICD-10-CM

## 2024-01-02 RX ORDER — ALLOPURINOL 300 MG
1 TABLET ORAL
Refills: 0 | DISCHARGE

## 2024-01-02 RX ORDER — ATORVASTATIN CALCIUM 80 MG/1
1 TABLET, FILM COATED ORAL
Refills: 0 | DISCHARGE

## 2024-01-02 RX ORDER — ASPIRIN/CALCIUM CARB/MAGNESIUM 324 MG
1 TABLET ORAL
Refills: 0 | DISCHARGE

## 2024-02-05 ENCOUNTER — RX RENEWAL (OUTPATIENT)
Age: 54
End: 2024-02-05

## 2024-02-05 RX ORDER — ALLOPURINOL 100 MG/1
100 TABLET ORAL
Qty: 90 | Refills: 1 | Status: ACTIVE | COMMUNITY
Start: 2022-03-28 | End: 1900-01-01

## 2024-02-07 NOTE — PATIENT PROFILE ADULT. - NS TRANSFER DISPOSITION PATIENT BELONGINGS
PT phoned unsure if she needs to see you or not for surgical clearance. She is having a left knee replacement on 2/21/24 with Dr. Lopez.    not applicable

## 2024-03-15 PROBLEM — M10.9 GOUT, UNSPECIFIED: Chronic | Status: ACTIVE | Noted: 2023-11-05

## 2024-03-15 PROBLEM — E78.5 HYPERLIPIDEMIA, UNSPECIFIED: Chronic | Status: ACTIVE | Noted: 2023-11-05

## 2024-03-15 PROBLEM — I25.10 ATHEROSCLEROTIC HEART DISEASE OF NATIVE CORONARY ARTERY WITHOUT ANGINA PECTORIS: Chronic | Status: ACTIVE | Noted: 2023-11-05

## 2024-03-18 ENCOUNTER — APPOINTMENT (OUTPATIENT)
Dept: RHEUMATOLOGY | Facility: CLINIC | Age: 54
End: 2024-03-18
Payer: COMMERCIAL

## 2024-03-18 VITALS
SYSTOLIC BLOOD PRESSURE: 132 MMHG | HEART RATE: 59 BPM | TEMPERATURE: 97.7 F | WEIGHT: 243.19 LBS | OXYGEN SATURATION: 98 % | HEIGHT: 73 IN | BODY MASS INDEX: 32.23 KG/M2 | RESPIRATION RATE: 16 BRPM | DIASTOLIC BLOOD PRESSURE: 79 MMHG

## 2024-03-18 DIAGNOSIS — S83.281D OTHER TEAR OF LATERAL MENISCUS, CURRENT INJURY, RIGHT KNEE, SUBSEQUENT ENCOUNTER: ICD-10-CM

## 2024-03-18 DIAGNOSIS — M48.061 SPINAL STENOSIS, LUMBAR REGION WITHOUT NEUROGENIC CLAUDICATION: ICD-10-CM

## 2024-03-18 DIAGNOSIS — S83.231A COMPLEX TEAR OF MEDIAL MENISCUS, CURRENT INJURY, RIGHT KNEE, INITIAL ENCOUNTER: ICD-10-CM

## 2024-03-18 DIAGNOSIS — M17.11 UNILATERAL PRIMARY OSTEOARTHRITIS, RIGHT KNEE: ICD-10-CM

## 2024-03-18 PROCEDURE — 99214 OFFICE O/P EST MOD 30 MIN: CPT

## 2024-03-18 PROCEDURE — G2211 COMPLEX E/M VISIT ADD ON: CPT

## 2024-03-18 NOTE — HISTORY OF PRESENT ILLNESS
[___ Month(s) Ago] : [unfilled] month(s) ago [FreeTextEntry1] : Continues to feel fine.  LBP has resolved.  Pt reports that since last visit, he has run a the [place] marathon and completed a "half Ironman".  He did experience pain in his left big toe several months ago - he took colchicine and it resolved quickly - ?mild gout flare.   No current complaints. [Anorexia] : no anorexia [Weight Loss] : no weight loss [Malaise] : no malaise [Chills] : no chills [Fever] : no fever [Fatigue] : no fatigue [Malar Facial Rash] : no malar facial rash [Skin Lesions] : no lesions [Skin Nodules] : no skin nodules [Oral Ulcers] : no oral ulcers [Cough] : no cough [Dry Mouth] : no dry mouth [Dysphagia] : no dysphagia [Dysphonia] : no dysphonia [Shortness of Breath] : no shortness of breath [Chest Pain] : no chest pain [Arthralgias] : no arthralgias [Joint Swelling] : no joint swelling [Joint Deformity] : no joint deformity [Joint Warmth] : no joint warmth [Decreased ROM] : no decreased range of motion [Morning Stiffness] : no morning stiffness [Dyspnea] : no dyspnea [Falls] : no falls [Myalgias] : no myalgias [Muscle Weakness] : no muscle weakness [Muscle Cramping] : no muscle cramping [Muscle Spasms] : no muscle spasms [Visual Changes] : no visual changes [Eye Pain] : no eye pain [Eye Redness] : no eye redness [Dry Eyes] : no dry eyes

## 2024-03-18 NOTE — ASSESSMENT
[FreeTextEntry1] : 53 year old male with: 1)  Gout:  Most recent uric acid above goal (6.18), though no pt w/ only 1 flare (mild) in the past several years..   - Cont allopurinol 100mg daily for now.   - Advised pt to call if he experiences another flare.  Would then plan to increase uric acid to 200mg daily once the flare resolves.   - Previously discussed gout dietary recommendations.   - Reiterated importance of exercise. 2)  OA of right knee, w/ medial and lateral meniscal tears s/p prior surgery.  Pt reports that he has been pain-free though the knee becomes displaced at times.   - Reiterated importance of knee exercises   - Knee brace, frank w/ running.   - Orthopedics f/u.   - Tylenol prn (avoiding NSAID's as pt on Plavix).   - heating pads or ice packs   - OTC topical analgesics 3)  LBP:  MRI revealed large disc extrusion at L5-S1 contributing to stenosis.  Symptoms have resolved.   - Cont PT / exercise   - Analgesia as above   - Ortho/spine f/u. Pt has received the COVID19 vaccine.

## 2024-03-19 LAB
ALBUMIN SERPL ELPH-MCNC: 4.3 G/DL
ALP BLD-CCNC: 89 U/L
ALT SERPL-CCNC: 32 U/L
ANION GAP SERPL CALC-SCNC: 11 MMOL/L
AST SERPL-CCNC: 33 U/L
BASOPHILS # BLD AUTO: 0.06 K/UL
BASOPHILS NFR BLD AUTO: 0.9 %
BILIRUB SERPL-MCNC: 0.3 MG/DL
BUN SERPL-MCNC: 19 MG/DL
CALCIUM SERPL-MCNC: 8.8 MG/DL
CHLORIDE SERPL-SCNC: 106 MMOL/L
CO2 SERPL-SCNC: 26 MMOL/L
CREAT SERPL-MCNC: 0.96 MG/DL
EGFR: 95 ML/MIN/1.73M2
EOSINOPHIL # BLD AUTO: 0.28 K/UL
EOSINOPHIL NFR BLD AUTO: 4.4 %
GLUCOSE SERPL-MCNC: 112 MG/DL
HCT VFR BLD CALC: 43.7 %
HGB BLD-MCNC: 15 G/DL
IMM GRANULOCYTES NFR BLD AUTO: 0.2 %
LYMPHOCYTES # BLD AUTO: 2.38 K/UL
LYMPHOCYTES NFR BLD AUTO: 37.1 %
MAN DIFF?: NORMAL
MCHC RBC-ENTMCNC: 31.8 PG
MCHC RBC-ENTMCNC: 34.3 GM/DL
MCV RBC AUTO: 92.6 FL
MONOCYTES # BLD AUTO: 0.58 K/UL
MONOCYTES NFR BLD AUTO: 9 %
NEUTROPHILS # BLD AUTO: 3.11 K/UL
NEUTROPHILS NFR BLD AUTO: 48.4 %
PLATELET # BLD AUTO: 166 K/UL
POTASSIUM SERPL-SCNC: 4.2 MMOL/L
PROT SERPL-MCNC: 6.5 G/DL
RBC # BLD: 4.72 M/UL
RBC # FLD: 12.5 %
SODIUM SERPL-SCNC: 143 MMOL/L
URATE SERPL-MCNC: 6.3 MG/DL
WBC # FLD AUTO: 6.42 K/UL

## 2024-03-28 ENCOUNTER — APPOINTMENT (OUTPATIENT)
Dept: INTERNAL MEDICINE | Facility: CLINIC | Age: 54
End: 2024-03-28
Payer: COMMERCIAL

## 2024-03-28 VITALS — SYSTOLIC BLOOD PRESSURE: 138 MMHG | DIASTOLIC BLOOD PRESSURE: 70 MMHG

## 2024-03-28 VITALS
RESPIRATION RATE: 14 BRPM | WEIGHT: 243 LBS | HEART RATE: 42 BPM | HEIGHT: 73 IN | BODY MASS INDEX: 32.2 KG/M2 | DIASTOLIC BLOOD PRESSURE: 75 MMHG | TEMPERATURE: 97.9 F | OXYGEN SATURATION: 98 % | SYSTOLIC BLOOD PRESSURE: 142 MMHG

## 2024-03-28 DIAGNOSIS — M10.9 GOUT, UNSPECIFIED: ICD-10-CM

## 2024-03-28 DIAGNOSIS — E78.5 HYPERLIPIDEMIA, UNSPECIFIED: ICD-10-CM

## 2024-03-28 DIAGNOSIS — Z00.00 ENCOUNTER FOR GENERAL ADULT MEDICAL EXAMINATION W/OUT ABNORMAL FINDINGS: ICD-10-CM

## 2024-03-28 DIAGNOSIS — E66.9 OBESITY, UNSPECIFIED: ICD-10-CM

## 2024-03-28 DIAGNOSIS — I10 ESSENTIAL (PRIMARY) HYPERTENSION: ICD-10-CM

## 2024-03-28 DIAGNOSIS — I25.10 ATHEROSCLEROTIC HEART DISEASE OF NATIVE CORONARY ARTERY W/OUT ANGINA PECTORIS: ICD-10-CM

## 2024-03-28 DIAGNOSIS — Z12.11 ENCOUNTER FOR SCREENING FOR MALIGNANT NEOPLASM OF COLON: ICD-10-CM

## 2024-03-28 DIAGNOSIS — Z02.82 ENCOUNTER FOR ADOPTION SERVICES: ICD-10-CM

## 2024-03-28 PROCEDURE — 36415 COLL VENOUS BLD VENIPUNCTURE: CPT

## 2024-03-28 PROCEDURE — 99386 PREV VISIT NEW AGE 40-64: CPT | Mod: 25

## 2024-03-28 PROCEDURE — G0447 BEHAVIOR COUNSEL OBESITY 15M: CPT

## 2024-03-28 RX ORDER — ISOSORBIDE MONONITRATE 30 MG/1
30 TABLET, EXTENDED RELEASE ORAL
Qty: 90 | Refills: 0 | Status: DISCONTINUED | COMMUNITY
Start: 2021-04-02 | End: 2024-03-28

## 2024-03-28 RX ORDER — CLOPIDOGREL BISULFATE 75 MG/1
75 TABLET, FILM COATED ORAL
Qty: 90 | Refills: 1 | Status: DISCONTINUED | COMMUNITY
Start: 2020-11-05 | End: 2024-03-28

## 2024-03-28 RX ORDER — CYCLOBENZAPRINE HYDROCHLORIDE 10 MG/1
10 TABLET, FILM COATED ORAL
Qty: 30 | Refills: 0 | Status: DISCONTINUED | COMMUNITY
Start: 2022-09-02 | End: 2024-03-28

## 2024-03-28 NOTE — PLAN
[FreeTextEntry1] : Check routine fasting labs. Discussed diet, exercise, and weight loss efforts. Prostate cancer screening with PSA. GI referral for screening colonoscopy. Vaccines reviewed. Recommend flu shot in the Fall. CAD - continue Lipitor and baby aspirin. Check fasting lipids and A1c. Followed with cardiology. BP is controlled. Gout - stable. On Allopurinol. Followed with rheumatology.  RTO 1 yr or PRN acute care.

## 2024-03-28 NOTE — HISTORY OF PRESENT ILLNESS
[FreeTextEntry1] : New pt [de-identified] : 53 year old M comes to establish medical care and for an annual exam. Previously followed by Dr. Flip Rizo.  He has hx of CAD s/p MI requiring 5 stents in 2020. He is on Atorvastatin 80 mg and baby aspirin. Cardiologist is Dr. Salas.  He reports feeling well. No complaints. He exercises often. Participates in marathons and racing competitions. Runs about 20 miles a week.

## 2024-03-28 NOTE — COUNSELING
[Potential consequences of obesity discussed] : Potential consequences of obesity discussed [Benefits of weight loss discussed] : Benefits of weight loss discussed [Encouraged to maintain food diary] : Encouraged to maintain food diary [Encouraged to increase physical activity] : Encouraged to increase physical activity [FreeTextEntry4] : 15 [Good understanding] : Patient has a good understanding of disease, goals and obesity follow-up plan

## 2024-03-28 NOTE — PHYSICAL EXAM
[No Edema] : there was no peripheral edema [Soft] : abdomen soft [Non Tender] : non-tender [No Rash] : no rash [Normal Gait] : normal gait [Normal Affect] : the affect was normal [Normal Mood] : the mood was normal [Normal] : the outer ears and nose were normal in appearance and the oropharynx was normal [No Lymphadenopathy] : no lymphadenopathy [Supple] : supple [Pedal Pulses Present] : the pedal pulses are present [Normal Bowel Sounds] : normal bowel sounds [Non-distended] : non-distended [No CVA Tenderness] : no CVA  tenderness [No Joint Swelling] : no joint swelling [No Spinal Tenderness] : no spinal tenderness [Grossly Normal Strength/Tone] : grossly normal strength/tone [de-identified] : friendly

## 2024-03-28 NOTE — HEALTH RISK ASSESSMENT
[Excellent] : ~his/her~ current health as excellent [No falls in past year] : Patient reported no falls in the past year [No] : In the past 12 months have you used drugs other than those required for medical reasons? No [0] : 2) Feeling down, depressed, or hopeless: Not at all (0) [PHQ-2 Negative - No further assessment needed] : PHQ-2 Negative - No further assessment needed [With Family] : lives with family [None] : None [Employed] : employed [] :  [Sexually Active] : sexually active [# Of Children ___] : has [unfilled] children [Smoke Detector] : smoke detector [Carbon Monoxide Detector] : carbon monoxide detector [Seat Belt] :  uses seat belt [Sunscreen] : uses sunscreen [Former] : Former [20 or more] : 20 or more [< 15 Years] : < 15 Years [Patient declined colonoscopy] : Patient declined colonoscopy [de-identified] : weights, cardio, runs marathons, biking, swimming [[id number]] : 0 [High Risk Behavior] : no high risk behavior [Change in mental status noted] : No change in mental status noted [Reports changes in hearing] : Reports no changes in hearing [Reports changes in vision] : Reports no changes in vision [Reports changes in dental health] : Reports no changes in dental health [ColonoscopyComments] : never

## 2024-03-29 ENCOUNTER — TRANSCRIPTION ENCOUNTER (OUTPATIENT)
Age: 54
End: 2024-03-29

## 2024-03-29 LAB
25(OH)D3 SERPL-MCNC: 38.2 NG/ML
ALBUMIN SERPL ELPH-MCNC: 4.4 G/DL
ALP BLD-CCNC: 84 U/L
ALT SERPL-CCNC: 32 U/L
ANION GAP SERPL CALC-SCNC: 13 MMOL/L
APPEARANCE: CLEAR
AST SERPL-CCNC: 28 U/L
BACTERIA: NEGATIVE /HPF
BILIRUB SERPL-MCNC: 0.7 MG/DL
BILIRUBIN URINE: NEGATIVE
BLOOD URINE: NEGATIVE
BUN SERPL-MCNC: 19 MG/DL
CALCIUM SERPL-MCNC: 9.3 MG/DL
CAST: 0 /LPF
CHLORIDE SERPL-SCNC: 104 MMOL/L
CHOLEST SERPL-MCNC: 113 MG/DL
CO2 SERPL-SCNC: 24 MMOL/L
COLOR: YELLOW
CREAT SERPL-MCNC: 0.91 MG/DL
EGFR: 101 ML/MIN/1.73M2
EPITHELIAL CELLS: 0 /HPF
ESTIMATED AVERAGE GLUCOSE: 108 MG/DL
GLUCOSE QUALITATIVE U: NEGATIVE MG/DL
GLUCOSE SERPL-MCNC: 81 MG/DL
HBA1C MFR BLD HPLC: 5.4 %
HCT VFR BLD CALC: 47.1 %
HDLC SERPL-MCNC: 54 MG/DL
HGB BLD-MCNC: 16 G/DL
KETONES URINE: NEGATIVE MG/DL
LDLC SERPL CALC-MCNC: 37 MG/DL
LEUKOCYTE ESTERASE URINE: NEGATIVE
MCHC RBC-ENTMCNC: 31.8 PG
MCHC RBC-ENTMCNC: 34 GM/DL
MCV RBC AUTO: 93.6 FL
MICROSCOPIC-UA: NORMAL
NITRITE URINE: NEGATIVE
NONHDLC SERPL-MCNC: 59 MG/DL
PH URINE: 6
PLATELET # BLD AUTO: 172 K/UL
POTASSIUM SERPL-SCNC: 4.1 MMOL/L
PROT SERPL-MCNC: 6.9 G/DL
PROTEIN URINE: NEGATIVE MG/DL
PSA SERPL-MCNC: 0.65 NG/ML
RBC # BLD: 5.03 M/UL
RBC # FLD: 13.1 %
RED BLOOD CELLS URINE: 0 /HPF
SODIUM SERPL-SCNC: 141 MMOL/L
SPECIFIC GRAVITY URINE: 1.02
TRIGL SERPL-MCNC: 122 MG/DL
TSH SERPL-ACNC: 0.68 UIU/ML
UROBILINOGEN URINE: 0.2 MG/DL
VIT B12 SERPL-MCNC: 560 PG/ML
WBC # FLD AUTO: 5.94 K/UL
WHITE BLOOD CELLS URINE: 0 /HPF

## 2024-07-11 ENCOUNTER — RX RENEWAL (OUTPATIENT)
Age: 54
End: 2024-07-11

## 2024-08-11 NOTE — ASU PATIENT PROFILE, ADULT - NSICDXPASTMEDICALHX_GEN_ALL_CORE_FT
Patient has very few symptoms does take albuterol if needed during exercise but actually before the left knee injury really no use during basketball season.  Has no need for the albuterol recently has been stable clinically aware of use of albuterol either prevention or rescue   PAST MEDICAL HISTORY:  CAD (coronary artery disease)     Gout     Gout     HTN (hypertension)     Hypertension Patient with a history of Hypertension, patient reports has not take BP medications in the past 2 years    Other tear of lateral meniscus as current injury     Other tear of medial meniscus, current injury, right knee, initial encounter

## 2024-09-16 ENCOUNTER — APPOINTMENT (OUTPATIENT)
Dept: RHEUMATOLOGY | Facility: CLINIC | Age: 54
End: 2024-09-16
Payer: COMMERCIAL

## 2024-09-16 VITALS
HEIGHT: 73 IN | BODY MASS INDEX: 32.2 KG/M2 | HEART RATE: 56 BPM | WEIGHT: 243 LBS | DIASTOLIC BLOOD PRESSURE: 46 MMHG | RESPIRATION RATE: 89 BRPM | SYSTOLIC BLOOD PRESSURE: 95 MMHG

## 2024-09-16 VITALS — DIASTOLIC BLOOD PRESSURE: 68 MMHG | SYSTOLIC BLOOD PRESSURE: 102 MMHG

## 2024-09-16 DIAGNOSIS — M17.11 UNILATERAL PRIMARY OSTEOARTHRITIS, RIGHT KNEE: ICD-10-CM

## 2024-09-16 DIAGNOSIS — M10.9 GOUT, UNSPECIFIED: ICD-10-CM

## 2024-09-16 DIAGNOSIS — S83.231A COMPLEX TEAR OF MEDIAL MENISCUS, CURRENT INJURY, RIGHT KNEE, INITIAL ENCOUNTER: ICD-10-CM

## 2024-09-16 DIAGNOSIS — S83.281D OTHER TEAR OF LATERAL MENISCUS, CURRENT INJURY, RIGHT KNEE, SUBSEQUENT ENCOUNTER: ICD-10-CM

## 2024-09-16 DIAGNOSIS — M48.061 SPINAL STENOSIS, LUMBAR REGION WITHOUT NEUROGENIC CLAUDICATION: ICD-10-CM

## 2024-09-16 PROCEDURE — G2211 COMPLEX E/M VISIT ADD ON: CPT

## 2024-09-16 PROCEDURE — 99214 OFFICE O/P EST MOD 30 MIN: CPT

## 2024-09-16 NOTE — ASSESSMENT
[FreeTextEntry1] : 54 year old male with: 1)  Gout:  Most recent uric acid slightly above goal (6.3), though pt w/ only 1 flare (mild) in the past several years..   - Cont allopurinol 100mg daily.   - Advised pt to call if he experiences another flare.  Would then plan to increase uric acid to 200mg daily once the flare resolves.   - Previously discussed gout dietary recommendations.   - Reiterated importance of exercise. 2)  OA of right knee, w/ medial and lateral meniscal tears s/p prior surgery.  Pt reports that he has been pain-free though the knee becomes displaced at times.   - Reiterated importance of knee exercises   - Knee brace, frank w/ running.   - Orthopedics f/u.   - Tylenol prn (avoiding NSAID's as pt on Plavix).   - heating pads or ice packs   - OTC topical analgesics 3)  LBP:  MRI revealed large disc extrusion at L5-S1 contributing to stenosis.  Symptoms have resolved.   - Cont PT / exercise   - Analgesia as above   - Ortho/spine f/u. Pt has received the COVID19 vaccine.

## 2024-09-16 NOTE — PHYSICAL EXAM
[General Appearance - Alert] : alert [General Appearance - In No Acute Distress] : in no acute distress [Sclera] : the sclera and conjunctiva were normal [Outer Ear] : the ears and nose were normal in appearance [Oropharynx] : the oropharynx was normal [Neck Appearance] : the appearance of the neck was normal [Neck Cervical Mass (___cm)] : no neck mass was observed [Jugular Venous Distention Increased] : there was no jugular-venous distention [Thyroid Diffuse Enlargement] : the thyroid was not enlarged [Thyroid Nodule] : there were no palpable thyroid nodules [Auscultation Breath Sounds / Voice Sounds] : lungs were clear to auscultation bilaterally [Heart Rate And Rhythm] : heart rate was normal and rhythm regular [Heart Sounds] : normal S1 and S2 [Heart Sounds Gallop] : no gallops [Murmurs] : no murmurs [Heart Sounds Pericardial Friction Rub] : no pericardial rub [Edema] : there was no peripheral edema [Bowel Sounds] : normal bowel sounds [Abdomen Soft] : soft [Abdomen Tenderness] : non-tender [Abdomen Mass (___ Cm)] : no abdominal mass palpated [Cervical Lymph Nodes Enlarged Posterior Bilaterally] : posterior cervical [Cervical Lymph Nodes Enlarged Anterior Bilaterally] : anterior cervical [Supraclavicular Lymph Nodes Enlarged Bilaterally] : supraclavicular [No CVA Tenderness] : no ~M costovertebral angle tenderness [No Spinal Tenderness] : no spinal tenderness [Skin Color & Pigmentation] : normal skin color and pigmentation [Skin Turgor] : normal skin turgor [] : no rash [No Focal Deficits] : no focal deficits [Oriented To Time, Place, And Person] : oriented to person, place, and time [Impaired Insight] : insight and judgment were intact [Affect] : the affect was normal [Abnormal Walk] : normal gait [Nail Clubbing] : no clubbing  or cyanosis of the fingernails [Musculoskeletal - Swelling] : no joint swelling seen [Motor Tone] : muscle strength and tone were normal [FreeTextEntry1] : No synovitis, full ROM in all joints;  (+)crepitus in right knee\par

## 2024-09-16 NOTE — HISTORY OF PRESENT ILLNESS
[___ Month(s) Ago] : [unfilled] month(s) ago [FreeTextEntry1] : 9/16/2024:  Still feeling fine since last visit.  He recently completed another partida-iron man competition.  No gout flares since last visit.  no current complaints, other than mild foot soreness after running 10K yesterday. 3/18/2024:  Continues to feel fine.  LBP has resolved.  Pt reports that since last visit, he has run a the [place] marathon and completed a "half Ironman".  He did experience pain in his left big toe several months ago - he took colchicine and it resolved quickly - ?mild gout flare.   No current complaints. [Anorexia] : no anorexia [Weight Loss] : no weight loss [Malaise] : no malaise [Fever] : no fever [Chills] : no chills [Fatigue] : no fatigue [Malar Facial Rash] : no malar facial rash [Skin Lesions] : no lesions [Skin Nodules] : no skin nodules [Oral Ulcers] : no oral ulcers [Cough] : no cough [Dry Mouth] : no dry mouth [Dysphonia] : no dysphonia [Dysphagia] : no dysphagia [Shortness of Breath] : no shortness of breath [Chest Pain] : no chest pain [Arthralgias] : no arthralgias [Joint Swelling] : no joint swelling [Joint Warmth] : no joint warmth [Joint Deformity] : no joint deformity [Decreased ROM] : no decreased range of motion [Morning Stiffness] : no morning stiffness [Falls] : no falls [Dyspnea] : no dyspnea [Myalgias] : no myalgias [Muscle Weakness] : no muscle weakness [Muscle Spasms] : no muscle spasms [Muscle Cramping] : no muscle cramping [Visual Changes] : no visual changes [Eye Pain] : no eye pain [Eye Redness] : no eye redness [Dry Eyes] : no dry eyes

## 2024-09-17 LAB
ALBUMIN SERPL ELPH-MCNC: 4.3 G/DL
ALP BLD-CCNC: 88 U/L
ALT SERPL-CCNC: 32 U/L
ANION GAP SERPL CALC-SCNC: 11 MMOL/L
AST SERPL-CCNC: 32 U/L
BASOPHILS # BLD AUTO: 0.05 K/UL
BASOPHILS NFR BLD AUTO: 0.9 %
BILIRUB SERPL-MCNC: 0.7 MG/DL
BUN SERPL-MCNC: 19 MG/DL
CALCIUM SERPL-MCNC: 8.9 MG/DL
CHLORIDE SERPL-SCNC: 107 MMOL/L
CO2 SERPL-SCNC: 25 MMOL/L
CREAT SERPL-MCNC: 0.92 MG/DL
EGFR: 99 ML/MIN/1.73M2
EOSINOPHIL # BLD AUTO: 0.18 K/UL
EOSINOPHIL NFR BLD AUTO: 3.2 %
GLUCOSE SERPL-MCNC: 100 MG/DL
HCT VFR BLD CALC: 46.6 %
HGB BLD-MCNC: 14.7 G/DL
IMM GRANULOCYTES NFR BLD AUTO: 0.2 %
LYMPHOCYTES # BLD AUTO: 1.69 K/UL
LYMPHOCYTES NFR BLD AUTO: 30.4 %
MAN DIFF?: NORMAL
MCHC RBC-ENTMCNC: 30.4 PG
MCHC RBC-ENTMCNC: 31.5 GM/DL
MCV RBC AUTO: 96.3 FL
MONOCYTES # BLD AUTO: 0.55 K/UL
MONOCYTES NFR BLD AUTO: 9.9 %
NEUTROPHILS # BLD AUTO: 3.08 K/UL
NEUTROPHILS NFR BLD AUTO: 55.4 %
PLATELET # BLD AUTO: 179 K/UL
POTASSIUM SERPL-SCNC: 4.3 MMOL/L
PROT SERPL-MCNC: 6.8 G/DL
RBC # BLD: 4.84 M/UL
RBC # FLD: 13.2 %
SODIUM SERPL-SCNC: 143 MMOL/L
URATE SERPL-MCNC: 6.5 MG/DL
WBC # FLD AUTO: 5.56 K/UL

## 2024-12-11 ENCOUNTER — APPOINTMENT (OUTPATIENT)
Dept: ELECTROPHYSIOLOGY | Facility: CLINIC | Age: 54
End: 2024-12-11
Payer: COMMERCIAL

## 2024-12-11 VITALS
DIASTOLIC BLOOD PRESSURE: 80 MMHG | BODY MASS INDEX: 30.54 KG/M2 | HEIGHT: 74 IN | WEIGHT: 238 LBS | HEART RATE: 58 BPM | SYSTOLIC BLOOD PRESSURE: 138 MMHG

## 2024-12-11 DIAGNOSIS — R00.1 BRADYCARDIA, UNSPECIFIED: ICD-10-CM

## 2024-12-11 PROCEDURE — 93000 ELECTROCARDIOGRAM COMPLETE: CPT | Mod: 59

## 2024-12-11 PROCEDURE — 99244 OFF/OP CNSLTJ NEW/EST MOD 40: CPT

## 2024-12-25 ENCOUNTER — INPATIENT (INPATIENT)
Facility: HOSPITAL | Age: 54
LOS: 0 days | Discharge: HOME | DRG: 287 | End: 2024-12-26
Attending: STUDENT IN AN ORGANIZED HEALTH CARE EDUCATION/TRAINING PROGRAM | Admitting: STUDENT IN AN ORGANIZED HEALTH CARE EDUCATION/TRAINING PROGRAM
Payer: COMMERCIAL

## 2024-12-25 VITALS
DIASTOLIC BLOOD PRESSURE: 85 MMHG | WEIGHT: 250.67 LBS | HEART RATE: 29 BPM | RESPIRATION RATE: 20 BRPM | OXYGEN SATURATION: 97 % | SYSTOLIC BLOOD PRESSURE: 177 MMHG | TEMPERATURE: 98 F

## 2024-12-25 DIAGNOSIS — Z98.890 OTHER SPECIFIED POSTPROCEDURAL STATES: Chronic | ICD-10-CM

## 2024-12-25 DIAGNOSIS — Z98.61 CORONARY ANGIOPLASTY STATUS: Chronic | ICD-10-CM

## 2024-12-25 PROCEDURE — 99053 MED SERV 10PM-8AM 24 HR FAC: CPT

## 2024-12-25 PROCEDURE — 99285 EMERGENCY DEPT VISIT HI MDM: CPT

## 2024-12-25 RX ORDER — ASPIRIN 81 MG
243 TABLET, DELAYED RELEASE (ENTERIC COATED) ORAL ONCE
Refills: 0 | Status: COMPLETED | OUTPATIENT
Start: 2024-12-25 | End: 2024-12-25

## 2024-12-25 RX ADMIN — Medication 243 MILLIGRAM(S): at 23:54

## 2024-12-25 NOTE — PHARMACOTHERAPY INTERVENTION NOTE - COMMENTS
Medication reconciliation completed as per records obtained from Dr. Grullon and confirmed at bedside with patient and family.

## 2024-12-25 NOTE — ED ADULT NURSE NOTE - CHIEF COMPLAINT QUOTE
From home c/o chest pain with slight left arm pain started today, Hx of heart attack 4 years ago, noted bradycardic in [place] on 20-30s, A/O x 3 conversant

## 2024-12-25 NOTE — ED ADULT NURSE NOTE - OBJECTIVE STATEMENT
patient complaining of chest discomfort to left side of chest since approx 3pm Wednesday at rest, patient recently had stress test on 12/23 that was abnormal and scheduled for an outpatient cath, concern for blockage.  Had appt scheduled, concerned for worsening performance (tri athelete), and slow heart rate.  As low as 30s, patient states with watch.  Patient on montior is in and out of sinus blake, with/without bigemeny, here today for evalauation today

## 2024-12-25 NOTE — ED ADULT TRIAGE NOTE - CHIEF COMPLAINT QUOTE
From home c/o chest pain with slight left arm pain started today, Hx of heart attack 4 years ago, noted bradycardic in [place] on 20's From home c/o chest pain with slight left arm pain started today, Hx of heart attack 4 years ago, noted bradycardic in [place] on 20-30s, A/O x 3 conversant

## 2024-12-26 ENCOUNTER — TRANSCRIPTION ENCOUNTER (OUTPATIENT)
Age: 54
End: 2024-12-26

## 2024-12-26 ENCOUNTER — RESULT REVIEW (OUTPATIENT)
Age: 54
End: 2024-12-26

## 2024-12-26 VITALS
DIASTOLIC BLOOD PRESSURE: 74 MMHG | RESPIRATION RATE: 17 BRPM | HEART RATE: 56 BPM | OXYGEN SATURATION: 95 % | SYSTOLIC BLOOD PRESSURE: 122 MMHG

## 2024-12-26 DIAGNOSIS — I25.10 ATHEROSCLEROTIC HEART DISEASE OF NATIVE CORONARY ARTERY WITHOUT ANGINA PECTORIS: ICD-10-CM

## 2024-12-26 DIAGNOSIS — I10 ESSENTIAL (PRIMARY) HYPERTENSION: ICD-10-CM

## 2024-12-26 DIAGNOSIS — I49.3 VENTRICULAR PREMATURE DEPOLARIZATION: ICD-10-CM

## 2024-12-26 DIAGNOSIS — R07.9 CHEST PAIN, UNSPECIFIED: ICD-10-CM

## 2024-12-26 DIAGNOSIS — R00.1 BRADYCARDIA, UNSPECIFIED: ICD-10-CM

## 2024-12-26 LAB
A1C WITH ESTIMATED AVERAGE GLUCOSE RESULT: 5.4 % — SIGNIFICANT CHANGE UP (ref 4–5.6)
ALBUMIN SERPL ELPH-MCNC: 3.9 G/DL — SIGNIFICANT CHANGE UP (ref 3.3–5.2)
ALBUMIN SERPL ELPH-MCNC: 4 G/DL — SIGNIFICANT CHANGE UP (ref 3.3–5.2)
ALP SERPL-CCNC: 81 U/L — SIGNIFICANT CHANGE UP (ref 40–120)
ALP SERPL-CCNC: 99 U/L — SIGNIFICANT CHANGE UP (ref 40–120)
ALT FLD-CCNC: 22 U/L — SIGNIFICANT CHANGE UP
ALT FLD-CCNC: 22 U/L — SIGNIFICANT CHANGE UP
ANION GAP SERPL CALC-SCNC: 10 MMOL/L — SIGNIFICANT CHANGE UP (ref 5–17)
ANION GAP SERPL CALC-SCNC: 13 MMOL/L — SIGNIFICANT CHANGE UP (ref 5–17)
APTT BLD: 30.1 SEC — SIGNIFICANT CHANGE UP (ref 24.5–35.6)
AST SERPL-CCNC: 25 U/L — SIGNIFICANT CHANGE UP
AST SERPL-CCNC: 25 U/L — SIGNIFICANT CHANGE UP
BASOPHILS # BLD AUTO: 0.08 K/UL — SIGNIFICANT CHANGE UP (ref 0–0.2)
BASOPHILS # BLD AUTO: 0.09 K/UL — SIGNIFICANT CHANGE UP (ref 0–0.2)
BASOPHILS NFR BLD AUTO: 1.1 % — SIGNIFICANT CHANGE UP (ref 0–2)
BASOPHILS NFR BLD AUTO: 1.2 % — SIGNIFICANT CHANGE UP (ref 0–2)
BILIRUB SERPL-MCNC: 0.3 MG/DL — LOW (ref 0.4–2)
BILIRUB SERPL-MCNC: 0.6 MG/DL — SIGNIFICANT CHANGE UP (ref 0.4–2)
BLD GP AB SCN SERPL QL: SIGNIFICANT CHANGE UP
BUN SERPL-MCNC: 18.6 MG/DL — SIGNIFICANT CHANGE UP (ref 8–20)
BUN SERPL-MCNC: 22.7 MG/DL — HIGH (ref 8–20)
CALCIUM SERPL-MCNC: 8.4 MG/DL — SIGNIFICANT CHANGE UP (ref 8.4–10.5)
CALCIUM SERPL-MCNC: 8.6 MG/DL — SIGNIFICANT CHANGE UP (ref 8.4–10.5)
CHLORIDE SERPL-SCNC: 102 MMOL/L — SIGNIFICANT CHANGE UP (ref 96–108)
CHLORIDE SERPL-SCNC: 104 MMOL/L — SIGNIFICANT CHANGE UP (ref 96–108)
CHOLEST SERPL-MCNC: 121 MG/DL — SIGNIFICANT CHANGE UP
CO2 SERPL-SCNC: 25 MMOL/L — SIGNIFICANT CHANGE UP (ref 22–29)
CO2 SERPL-SCNC: 27 MMOL/L — SIGNIFICANT CHANGE UP (ref 22–29)
CREAT SERPL-MCNC: 0.85 MG/DL — SIGNIFICANT CHANGE UP (ref 0.5–1.3)
CREAT SERPL-MCNC: 1.07 MG/DL — SIGNIFICANT CHANGE UP (ref 0.5–1.3)
EGFR: 104 ML/MIN/1.73M2 — SIGNIFICANT CHANGE UP
EGFR: 83 ML/MIN/1.73M2 — SIGNIFICANT CHANGE UP
EOSINOPHIL # BLD AUTO: 0.48 K/UL — SIGNIFICANT CHANGE UP (ref 0–0.5)
EOSINOPHIL # BLD AUTO: 0.5 K/UL — SIGNIFICANT CHANGE UP (ref 0–0.5)
EOSINOPHIL NFR BLD AUTO: 6.3 % — HIGH (ref 0–6)
EOSINOPHIL NFR BLD AUTO: 7.1 % — HIGH (ref 0–6)
ESTIMATED AVERAGE GLUCOSE: 108 MG/DL — SIGNIFICANT CHANGE UP (ref 68–114)
GLUCOSE SERPL-MCNC: 93 MG/DL — SIGNIFICANT CHANGE UP (ref 70–99)
GLUCOSE SERPL-MCNC: 93 MG/DL — SIGNIFICANT CHANGE UP (ref 70–99)
HCT VFR BLD CALC: 43.1 % — SIGNIFICANT CHANGE UP (ref 39–50)
HCT VFR BLD CALC: 44.8 % — SIGNIFICANT CHANGE UP (ref 39–50)
HDLC SERPL-MCNC: 57 MG/DL — SIGNIFICANT CHANGE UP
HGB BLD-MCNC: 14.9 G/DL — SIGNIFICANT CHANGE UP (ref 13–17)
HGB BLD-MCNC: 15.3 G/DL — SIGNIFICANT CHANGE UP (ref 13–17)
IMM GRANULOCYTES NFR BLD AUTO: 0.3 % — SIGNIFICANT CHANGE UP (ref 0–0.9)
IMM GRANULOCYTES NFR BLD AUTO: 0.4 % — SIGNIFICANT CHANGE UP (ref 0–0.9)
INR BLD: 0.94 RATIO — SIGNIFICANT CHANGE UP (ref 0.85–1.16)
LIPID PNL WITH DIRECT LDL SERPL: 53 MG/DL — SIGNIFICANT CHANGE UP
LYMPHOCYTES # BLD AUTO: 2.91 K/UL — SIGNIFICANT CHANGE UP (ref 1–3.3)
LYMPHOCYTES # BLD AUTO: 3.56 K/UL — HIGH (ref 1–3.3)
LYMPHOCYTES # BLD AUTO: 43.3 % — SIGNIFICANT CHANGE UP (ref 13–44)
LYMPHOCYTES # BLD AUTO: 44.6 % — HIGH (ref 13–44)
MAGNESIUM SERPL-MCNC: 1.9 MG/DL — SIGNIFICANT CHANGE UP (ref 1.8–2.6)
MCHC RBC-ENTMCNC: 30.8 PG — SIGNIFICANT CHANGE UP (ref 27–34)
MCHC RBC-ENTMCNC: 30.8 PG — SIGNIFICANT CHANGE UP (ref 27–34)
MCHC RBC-ENTMCNC: 34.2 G/DL — SIGNIFICANT CHANGE UP (ref 32–36)
MCHC RBC-ENTMCNC: 34.6 G/DL — SIGNIFICANT CHANGE UP (ref 32–36)
MCV RBC AUTO: 89 FL — SIGNIFICANT CHANGE UP (ref 80–100)
MCV RBC AUTO: 90.3 FL — SIGNIFICANT CHANGE UP (ref 80–100)
MONOCYTES # BLD AUTO: 0.58 K/UL — SIGNIFICANT CHANGE UP (ref 0–0.9)
MONOCYTES # BLD AUTO: 0.78 K/UL — SIGNIFICANT CHANGE UP (ref 0–0.9)
MONOCYTES NFR BLD AUTO: 8.6 % — SIGNIFICANT CHANGE UP (ref 2–14)
MONOCYTES NFR BLD AUTO: 9.8 % — SIGNIFICANT CHANGE UP (ref 2–14)
NEUTROPHILS # BLD AUTO: 2.65 K/UL — SIGNIFICANT CHANGE UP (ref 1.8–7.4)
NEUTROPHILS # BLD AUTO: 3.03 K/UL — SIGNIFICANT CHANGE UP (ref 1.8–7.4)
NEUTROPHILS NFR BLD AUTO: 37.8 % — LOW (ref 43–77)
NEUTROPHILS NFR BLD AUTO: 39.5 % — LOW (ref 43–77)
NON HDL CHOLESTEROL: 64 MG/DL — SIGNIFICANT CHANGE UP
PLATELET # BLD AUTO: 157 K/UL — SIGNIFICANT CHANGE UP (ref 150–400)
PLATELET # BLD AUTO: 175 K/UL — SIGNIFICANT CHANGE UP (ref 150–400)
POTASSIUM SERPL-MCNC: 3.9 MMOL/L — SIGNIFICANT CHANGE UP (ref 3.5–5.3)
POTASSIUM SERPL-MCNC: 3.9 MMOL/L — SIGNIFICANT CHANGE UP (ref 3.5–5.3)
POTASSIUM SERPL-SCNC: 3.9 MMOL/L — SIGNIFICANT CHANGE UP (ref 3.5–5.3)
POTASSIUM SERPL-SCNC: 3.9 MMOL/L — SIGNIFICANT CHANGE UP (ref 3.5–5.3)
PROT SERPL-MCNC: 6.4 G/DL — LOW (ref 6.6–8.7)
PROT SERPL-MCNC: 6.7 G/DL — SIGNIFICANT CHANGE UP (ref 6.6–8.7)
PROTHROM AB SERPL-ACNC: 10.9 SEC — SIGNIFICANT CHANGE UP (ref 9.9–13.4)
RBC # BLD: 4.84 M/UL — SIGNIFICANT CHANGE UP (ref 4.2–5.8)
RBC # BLD: 4.96 M/UL — SIGNIFICANT CHANGE UP (ref 4.2–5.8)
RBC # FLD: 12.5 % — SIGNIFICANT CHANGE UP (ref 10.3–14.5)
RBC # FLD: 12.7 % — SIGNIFICANT CHANGE UP (ref 10.3–14.5)
SODIUM SERPL-SCNC: 139 MMOL/L — SIGNIFICANT CHANGE UP (ref 135–145)
SODIUM SERPL-SCNC: 142 MMOL/L — SIGNIFICANT CHANGE UP (ref 135–145)
TRIGL SERPL-MCNC: 57 MG/DL — SIGNIFICANT CHANGE UP
TROPONIN T, HIGH SENSITIVITY RESULT: 14 NG/L — SIGNIFICANT CHANGE UP (ref 0–51)
TROPONIN T, HIGH SENSITIVITY RESULT: 14 NG/L — SIGNIFICANT CHANGE UP (ref 0–51)
TROPONIN T, HIGH SENSITIVITY RESULT: 15 NG/L — SIGNIFICANT CHANGE UP (ref 0–51)
WBC # BLD: 6.72 K/UL — SIGNIFICANT CHANGE UP (ref 3.8–10.5)
WBC # BLD: 7.99 K/UL — SIGNIFICANT CHANGE UP (ref 3.8–10.5)
WBC # FLD AUTO: 6.72 K/UL — SIGNIFICANT CHANGE UP (ref 3.8–10.5)
WBC # FLD AUTO: 7.99 K/UL — SIGNIFICANT CHANGE UP (ref 3.8–10.5)

## 2024-12-26 PROCEDURE — 99223 1ST HOSP IP/OBS HIGH 75: CPT

## 2024-12-26 PROCEDURE — C1894: CPT

## 2024-12-26 PROCEDURE — C8929: CPT

## 2024-12-26 PROCEDURE — 99152 MOD SED SAME PHYS/QHP 5/>YRS: CPT

## 2024-12-26 PROCEDURE — 85730 THROMBOPLASTIN TIME PARTIAL: CPT

## 2024-12-26 PROCEDURE — 93458 L HRT ARTERY/VENTRICLE ANGIO: CPT

## 2024-12-26 PROCEDURE — 83036 HEMOGLOBIN GLYCOSYLATED A1C: CPT

## 2024-12-26 PROCEDURE — 83880 ASSAY OF NATRIURETIC PEPTIDE: CPT

## 2024-12-26 PROCEDURE — 86901 BLOOD TYPING SEROLOGIC RH(D): CPT

## 2024-12-26 PROCEDURE — 93458 L HRT ARTERY/VENTRICLE ANGIO: CPT | Mod: 26

## 2024-12-26 PROCEDURE — 80053 COMPREHEN METABOLIC PANEL: CPT

## 2024-12-26 PROCEDURE — 99232 SBSQ HOSP IP/OBS MODERATE 35: CPT | Mod: 57

## 2024-12-26 PROCEDURE — 84443 ASSAY THYROID STIM HORMONE: CPT

## 2024-12-26 PROCEDURE — 36415 COLL VENOUS BLD VENIPUNCTURE: CPT

## 2024-12-26 PROCEDURE — 83735 ASSAY OF MAGNESIUM: CPT

## 2024-12-26 PROCEDURE — 93306 TTE W/DOPPLER COMPLETE: CPT | Mod: 26

## 2024-12-26 PROCEDURE — 71045 X-RAY EXAM CHEST 1 VIEW: CPT

## 2024-12-26 PROCEDURE — 99232 SBSQ HOSP IP/OBS MODERATE 35: CPT

## 2024-12-26 PROCEDURE — 80061 LIPID PANEL: CPT

## 2024-12-26 PROCEDURE — 93010 ELECTROCARDIOGRAM REPORT: CPT | Mod: 76

## 2024-12-26 PROCEDURE — 99285 EMERGENCY DEPT VISIT HI MDM: CPT | Mod: 25

## 2024-12-26 PROCEDURE — 93005 ELECTROCARDIOGRAM TRACING: CPT

## 2024-12-26 PROCEDURE — C1769: CPT

## 2024-12-26 PROCEDURE — 85610 PROTHROMBIN TIME: CPT

## 2024-12-26 PROCEDURE — 86850 RBC ANTIBODY SCREEN: CPT

## 2024-12-26 PROCEDURE — 85025 COMPLETE CBC W/AUTO DIFF WBC: CPT

## 2024-12-26 PROCEDURE — C1887: CPT

## 2024-12-26 PROCEDURE — 86900 BLOOD TYPING SEROLOGIC ABO: CPT

## 2024-12-26 PROCEDURE — 71045 X-RAY EXAM CHEST 1 VIEW: CPT | Mod: 26

## 2024-12-26 PROCEDURE — 84484 ASSAY OF TROPONIN QUANT: CPT

## 2024-12-26 RX ORDER — ASPIRIN 81 MG
81 TABLET, DELAYED RELEASE (ENTERIC COATED) ORAL DAILY
Refills: 0 | Status: DISCONTINUED | OUTPATIENT
Start: 2024-12-26 | End: 2024-12-26

## 2024-12-26 RX ORDER — SODIUM CHLORIDE 9 MG/ML
250 INJECTION, SOLUTION INTRAMUSCULAR; INTRAVENOUS; SUBCUTANEOUS ONCE
Refills: 0 | Status: COMPLETED | OUTPATIENT
Start: 2024-12-26 | End: 2024-12-26

## 2024-12-26 RX ORDER — ALLOPURINOL 100 MG/1
100 TABLET ORAL AT BEDTIME
Refills: 0 | Status: DISCONTINUED | OUTPATIENT
Start: 2024-12-26 | End: 2024-12-26

## 2024-12-26 RX ORDER — ATORVASTATIN CALCIUM 40 MG/1
80 TABLET, FILM COATED ORAL AT BEDTIME
Refills: 0 | Status: DISCONTINUED | OUTPATIENT
Start: 2024-12-26 | End: 2024-12-26

## 2024-12-26 RX ORDER — LOSARTAN POTASSIUM 100 MG/1
1 TABLET, FILM COATED ORAL
Qty: 30 | Refills: 1
Start: 2024-12-26 | End: 2025-02-23

## 2024-12-26 RX ORDER — LOSARTAN POTASSIUM 100 MG/1
25 TABLET, FILM COATED ORAL DAILY
Refills: 0 | Status: DISCONTINUED | OUTPATIENT
Start: 2024-12-26 | End: 2024-12-26

## 2024-12-26 RX ADMIN — Medication 81 MILLIGRAM(S): at 05:08

## 2024-12-26 RX ADMIN — LOSARTAN POTASSIUM 25 MILLIGRAM(S): 100 TABLET, FILM COATED ORAL at 13:17

## 2024-12-26 RX ADMIN — SODIUM CHLORIDE 250 MILLILITER(S): 9 INJECTION, SOLUTION INTRAMUSCULAR; INTRAVENOUS; SUBCUTANEOUS at 13:17

## 2024-12-26 NOTE — CONSULT NOTE ADULT - PROBLEM SELECTOR RECOMMENDATION 2
Plan as above  - Pt with hx of bradycardia; worsening recently, evaluated outpt  - ECG: Initial ECG blake with bigeminy; repeat ECG blake @ 41bpm bigem resolved   - Repeat ECG if any clinical indication or change on tele  - Zio Monitor 12/9 - 12/13; results not yet available   - Pt discussed potential need for PPM if develops worsening sx   - Avoid medication that may worsen bradycardia; avoid BB & CCB

## 2024-12-26 NOTE — ED PROVIDER NOTE - PHYSICAL EXAMINATION
Constitutional: Awake, alert, in no acute distress  Eyes: no scleral icterus  HENT: normocephalic, atraumatic, moist oral mucosa  Neck: supple  CV: +bradycardic, no murmur  Pulm: non-labored respirations, CTAB  Abdomen: soft, non-tender, non-distended  Extremities: no edema, no deformity  Skin: no rash, no jaundice  Neuro: AAOx3, moving all extremities equally

## 2024-12-26 NOTE — CHART NOTE - NSCHARTNOTEFT_GEN_A_CORE
Now s/p LHC via RRA with Dr. Grace, tolerated procedure well. Pt arrived to recovery in NAD and HDS, access site stable, no bleed/hematoma, distal pulse +,   Intraprocedurally: patent stents RCA and LAD, mid circ 40% unchanged   Medications:   Fentanyl: 50mcg  Versed: 1mg   Heparin: 5000 units   Omnipaque: 58ml   Closure device: vascband     -Pt is already in-patient  -post cardiac cath orders  -radial precautions  -remove radial band at 1400, OOB 1500   -NS 0.9% 250ml/hr x 1 bolus: post procedure MOSES ppx   -continue current medical therapy  -records obtained from Dr. Bridges and Dr. Dueñas, HR monitor with range 25bpm-140's and PVC burden 19.4% discussed with EP as this may be contributing to reduced EF, however unable to tolerate BB. EP will follow    -start Losartan 25mg daily  -continue lipitor 80mg  -[facility] following during hospitalization

## 2024-12-26 NOTE — CONSULT NOTE ADULT - NS ATTEND AMEND GEN_ALL_CORE FT
Mr Horn is a 54 year old male with CAD s/p inferior NSTEMI and PCI 8/2020 (to RCA, mLAD and dLCx), borderline HTN, and sinus bradycardia.  He has a recent onset of exercise intolerance, presumably timed with a 20% EF drop. LHC today was negative for new abstraction, r/o ICMP. Echo today with no valvular disease either, and history does not support other causes of CMP such as ETOH, myocarditis or infiltrative diseases.    Oh note, he has a high burden (~20%) of PVCs, captured on ACG here- RBLS tV3 suggesting a PPM as a likely site of origin. 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 having a history of CAD with indwelling stents precludes class 1C agents, and amiodarone will be undesirable long term given his young age. BB may be limited by a low baseline rate and patient's avid athletic predispositions, leaving Sotalol as the main drug alternative.  I have discussed the therapeutic options with Mr Horn, which include drug Tx or an ablation, and it's his wish to proceed with the latter.  We will schedule him for a PVC ablation with Dr. Adame, and will aim for a CMR prior to the procedure to ro other arrhythmogenic substrate if possible to be done before then.  Agree with upstream GDMT in the interim.

## 2024-12-26 NOTE — H&P ADULT - NSHPPHYSICALEXAM_GEN_ALL_CORE
VITALS:   T(C): 36.6 (12-26-24 @ 07:16), Max: 36.6 (12-26-24 @ 07:16)  HR: 41 (12-26-24 @ 07:16) (29 - 49)  BP: 144/92 (12-26-24 @ 07:16) (128/65 - 177/85)  RR: 16 (12-26-24 @ 07:16) (16 - 20)  SpO2: 99% (12-26-24 @ 07:16) (95% - 99%)    GENERAL: NAD, lying in bed comfortably  HEAD:  Atraumatic, Normocephalic  EYES: EOMI, PERRLA, conjunctiva and sclera clear  ENT: Moist mucous membranes  NECK: Supple, No JVD  CHEST/LUNG: Clear to auscultation bilaterally; No rales, rhonchi, wheezing, or rubs. Unlabored respirations  HEART: Bradycardia; No murmurs, rubs, or gallops  ABDOMEN: BSx4; Soft, nontender, nondistended  EXTREMITIES:  2+ Peripheral Pulses, brisk capillary refill. No clubbing, cyanosis, or edema  NERVOUS SYSTEM:  A&Ox3, no focal deficits   SKIN: No rashes or lesions  PSYCH: Normal affect, euthymic mood

## 2024-12-26 NOTE — DISCHARGE NOTE PROVIDER - PROVIDER TOKENS
PROVIDER:[TOKEN:[[id number]],FOLLOWUP:[1 week]] PROVIDER:[TOKEN:[[id number]],FOLLOWUP:[1 week]],PROVIDER:[TOKEN:[[id number]],FOLLOWUP:[1 week],ESTABLISHEDPATIENT:[T]]

## 2024-12-26 NOTE — H&P ADULT - NSICDXPASTMEDICALHX_GEN_ALL_CORE_FT
PAST MEDICAL HISTORY:  CAD (coronary artery disease)     CAD (coronary artery disease)     Gout     Gout     Gout     HTN (hypertension)     Hyperlipidemia     Hypertension Patient with a history of Hypertension, patient reports has not take BP medications in the past 2 years    Other tear of lateral meniscus as current injury     Other tear of medial meniscus, current injury, right knee, initial encounter

## 2024-12-26 NOTE — DISCHARGE NOTE PROVIDER - NSDCFUADDINST_GEN_ALL_CORE_FT
Restricted use with no heavy lifting of affected arm for 48 hours.  No submerging the arm in water for 48 hours.  You may start showering today.  Call your doctor for any bleeding, swelling, loss of sensation in the hand or fingers, or fingers turning blue.  If heavy bleeding or large lumps form, hold pressure at the spot and come to the Emergency Room.   Restricted use with no heavy lifting of affected arm for 5 days. No heavy lifting more than 5 lbs.   No submerging the arm in water for 48 hours.  You may start showering today.  Call your doctor for any bleeding, swelling, loss of sensation in the hand or fingers, or fingers turning blue.  If heavy bleeding or large lumps form, hold pressure at the spot and come to the Emergency Room.

## 2024-12-26 NOTE — H&P ADULT - ASSESSMENT
54 y.o. male with PMHx CAD s/p PCI (5 stents in 2020; RCA, mLAD, dLCx), gout, HLD, h/o bradycardia pw chest pain, admitted for C.     #CAD sp PCI  #Bradycardia  Trop negative  h/o chronic bradycardia, monitor in SDU  pads at bedside   h/o abnormal stress test  TTE pending   [facility] pending 12/26  cards following, terrie recs  ASA    #Gout  cw Allopurinol    #HLD  cw lipitor    DVT ppx: SCD pending [facility]  Diet: NPO  Dispo: pending [facility], cards recs

## 2024-12-26 NOTE — DISCHARGE NOTE PROVIDER - NSDCFUSCHEDAPPT_GEN_ALL_CORE_FT
Lindsay Bernstein  [facility]  HNT PreAdmits  Scheduled Appointment: 12/27/2024    Armin Ashraf  [facility]  ELECTROPH [address]  Scheduled Appointment: 02/25/2025    Mehul Saxena  [facility]  RHEUM [address]  Scheduled Appointment: 03/10/2025

## 2024-12-26 NOTE — DISCHARGE NOTE PROVIDER - NSDCCPCAREPLAN_GEN_ALL_CORE_FT
PRINCIPAL DISCHARGE DIAGNOSIS  Diagnosis: Chest pain  Assessment and Plan of Treatment: Coronary artery disease is the buildup of plaque in the arteries that supply oxygen-rich blood to your heart. Plaque causes a narrowing or blockage that could result in a heart attack. Symptoms include chest pain or discomfort, shortness of breath, dizziness, palpitations, fatigue or reduced exercise tolerance. .  Go to the ED with any acute onset of chest pain, palpitations, shortness of breath or dizziness. Continue to take your aspirin 81mg daily.  Managing risk factors will help keep your stent open and prevent future blockages, risk factors may include: high blood pressure, high cholesterol, diabetes, obesity, sedentary life style and smoking.    Your diet should be low in fat, cholesterol, salt and carbohydrates, increase fruits (caution if diabetic), vegetables and whole grains/fiber rich foods.   Take all your cardiac  medications as prescribed.    Exercise is a very important factor in heart health. Once your post procedure restrictions have passed, you should engage in heart healthy, aerobic exercise. Be sure to have clearance from your cardiologist. Cardiac rehab programs could be extremely beneficial and your cardiologist could help set this up.   Follow up with your cardiologist within 1-2 weeks after your procedure.   Call your cardiologist or our unit ([phone]) with any questions or concerns that may arise.      SECONDARY DISCHARGE DIAGNOSES  Diagnosis: Frequent PVCs  Assessment and Plan of Treatment: -Please follow up with electrophysiologist Dr. Dangelo Aldridge. The office will reach out to you by next week to scheduled cardiac MRI and ablation. Please call by Wednesday if you do not hear from them.  -Please return to nearest ER if you develop any chest pain, chest pressure, shortness of breath, palptiations, dizziness or passing out.

## 2024-12-26 NOTE — H&P ADULT - HISTORY OF PRESENT ILLNESS
54 y.o. male with PMHx CAD s/p PCI (5 stents in 2020; RCA, mLAD, dLCx), gout, HLD, h/o bradycardia pw Chest pain. Reports 2 months onset of decreased exercise tolerance. Underwent stress test with outpatient cardiologist, which was abnormal and was told to get LHC at [facility] on Friday. Noted persistent chest pain with radiation to the left arm last night and came for further eval. ROS negative for fever, chill, SOB, n/v/d/c nor urinary concerns.     ED vitals notable for bradycardia, admitted for LHC .

## 2024-12-26 NOTE — H&P ADULT - NSHPLABSRESULTS_GEN_ALL_CORE
14.9   6.72  )-----------( 157      ( 26 Dec 2024 04:50 )             43.1       12-26    142  |  104  |  18.6  ----------------------------<  93  3.9   |  25.0  |  0.85    Ca    8.4      26 Dec 2024 04:50  Mg     1.9     12-26    TPro  6.4[L]  /  Alb  3.9  /  TBili  0.6  /  DBili  x   /  AST  25  /  ALT  22  /  AlkPhos  81  12-26              Urinalysis Basic - ( 26 Dec 2024 04:50 )    Color: x / Appearance: x / SG: x / pH: x  Gluc: 93 mg/dL / Ketone: x  / Bili: x / Urobili: x   Blood: x / Protein: x / Nitrite: x   Leuk Esterase: x / RBC: x / WBC x   Sq Epi: x / Non Sq Epi: x / Bacteria: x        PT/INR - ( 25 Dec 2024 23:45 )   PT: 10.9 sec;   INR: 0.94 ratio         PTT - ( 25 Dec 2024 23:45 )  PTT:30.1 sec    Lactate Trend            CAPILLARY BLOOD GLUCOSE

## 2024-12-26 NOTE — ED PROVIDER NOTE - CLINICAL SUMMARY MEDICAL DECISION MAKING FREE TEXT BOX
54y M w/ hx CAD s/p stents presents for chest pain. Pt initially bradycardic to 29 at time of triage. Pt appears comfortable and in no distress. Reports prior history of bradycardia and says that his HR is usually in the 40s-50s. Monitor showing SB with rate 40s-50s, sometimes with bigeminy pattern. Blood pressure stable. Will check labs, consult cardiology.

## 2024-12-26 NOTE — CONSULT NOTE ADULT - ASSESSMENT
Risk Assessments:  ASA: 3  Mallampati: 2  BRA: 0.5%  Creat: 0.85  GFR: 104    Indication: abnormal stress test/chest pain    Coronary Anatomy:     Plan:   -[facility]   -Preferred access:   -EKG and labs reviewed  -Aspirin 81mg po pre procedure  -250 0.9% NS bolus pre procedure: MOSES ppx   -risks and benefits discussed with patient, consent obtained  Risk Assessments:  ASA: 3  Mallampati: 2  BRA: 0.5%  Creat: 0.85  GFR: 104    Indication: abnormal stress test/chest pain    Coronary Anatomy:     Plan:   -[facility]   -Preferred access: RRA   -EKG and labs reviewed  -Aspirin 81mg po pre procedure  -250 0.9% NS bolus pre procedure: MOSES ppx   -risks and benefits discussed with patient, consent obtained

## 2024-12-26 NOTE — CONSULT NOTE ADULT - SUBJECTIVE AND OBJECTIVE BOX
Seen by Dr. Adame on 24 for Sinus Bradycardia in office setting  Outpatient Cardiologist: Dr. Salas    54 year old male with CAD s/p inferior NSTEMI and PCI 2020 (to RCA, mLAD and dLCx), borderline HTN, and sinus bradycardia who presented to the ER with chest pain. Seen by Dr. Adame recently:  He has a history of marked sinus bradycardia which had been asymptomatic. ECG in 2021 was notable for sinus bradycardia at 46 bpm with narrow QRS, and some ECGs with junctional escape rhythm as well. He has remained very active, and runs distances and participates in Triathalons. He monitors his HR's via smart watch and has noticed it takes longer for his HR's to accelerate with exercise (monitored HR's 120-145 BPM during workouts) and also notes that he can no longer run nearly as fast as he used to. However, he has not had increasing dyspnea, SOB, palpitations, dizziness or any recurrent syncopal episodes and feels well during workouts just "slower" than usual. He cut back on training in the last 4-6 weeks dye to this. Lowest HR's ~38-40BPM, resting. He did have an episode of syncope after running the [place] 1 year ago- he felt unwell after crossing the finish line, and ECG was apparently unremarkable. He went to [place], found to have electrolyte disturbance, low potassium, and dehydration- there was no cardiac concern at that time. He has not had any further dizziness or syncope.  ?  Recent TTE revealed mild global HK with LVEF 45-50%. A TTE in  after his PCI revealed LVEF 60-65%  He is currently wearing Zio monitor (1- week in duration).  ?  Cardiology Summary  EK2021, Sinus Bradycardia  - Old inferior infarct  EC24: SB HR 58 BPM, PVCx 2    Remote/Ambulatory Rhythm Monitoring: Zio monitoring - (pending results).    Stress Test: 3/29/21 13 METS, small fixed defect in the basal inferior wall, no ischemia    Echo: 24 LVEF 45-50%, LA 4.2cm, mild LVH, mild global HK, trace MR, trace AI, trace TR, RVSP <25  TTE 2020 LVEF 60-65%, no RWMA    PAST MEDICAL & SURGICAL HISTORY:  Other tear of medial meniscus, current injury, right knee, initial encounter  Other tear of lateral meniscus as current injury  Hypertension  Patient with a history of Hypertension, patient reports has not take BP medications in the past 2 years  Gout  CAD (coronary artery disease)  HTN (hypertension)  Hyperlipidemia  CAD (coronary artery disease)  Gout  No significant past surgical history  H/O cardiac catheterization  x 5 stents  History of percutaneous coronary intervention    REVIEW OF SYSTEMS  General:	  Skin/Breast:  Ophthalmologic:	  ENMT:	  Respiratory and Thorax:  Cardiovascular:	  Gastrointestinal:	  Genitourinary:  Musculoskeletal:	  Neurological:	  Psychiatric:	  Hematology/Lymphatics:	  Endocrine:	  Allergic/Immunologic:	    MEDICATIONS  (STANDING):  allopurinol 100 milliGRAM(s) Oral at bedtime  aspirin  chewable 81 milliGRAM(s) Oral daily  atorvastatin 80 milliGRAM(s) Oral at bedtime    Allergies  No Known Allergies    SOCIAL HISTORY:    FAMILY HISTORY:  No pertinent family history in first degree relatives    Vital Signs Last 24 Hrs  T(C): 36.6 (26 Dec 2024 07:16), Max: 36.6 (26 Dec 2024 07:16)  T(F): 97.8 (26 Dec 2024 07:16), Max: 97.8 (26 Dec 2024 07:16)  HR: 41 (26 Dec 2024 07:16) (29 - 49)  BP: 144/92 (26 Dec 2024 07:16) (128/65 - 177/85)  RR: 16 (26 Dec 2024 07:16) (16 - 20)  SpO2: 99% (26 Dec 2024 07:16) (95% - 99%)    Parameters below as of 26 Dec 2024 07:16  Patient On (Oxygen Delivery Method): room air    Physical Exam:  Constitutional: AAOx3, NAD  Neck: supple, No JVD  Cardiovascular: +S1S2 RRR, no murmurs, rubs, gallops   Pulmonary: CTA b/l, unlabored, no wheezes, rales. No rhonchi  Abdomen: +BS, soft NTND  Extremities: no edema b/l,  Neuro: non focal, speech clear, DUNHAM x 4    LABS:                        14.9   6.72  )-----------( 157      ( 26 Dec 2024 04:50 )             43.1     142  |  104  |  18.6  ----------------------------<  93  3.9   |  25.0  |  0.85  Ca    8.4      26 Dec 2024 04:50  Mg     1.9       TPro  6.4[L]  /  Alb  3.9  /  TBili  0.6  /  DBili  x   /  AST  25  /  ALT  22  /  AlkPhos  81    LIVER FUNCTIONS - ( 26 Dec 2024 04:50 )  Alb: 3.9 g/dL / Pro: 6.4 g/dL / ALK PHOS: 81 U/L / ALT: 22 U/L / AST: 25 U/L / GGT: x         PT/INR - ( 25 Dec 2024 23:45 )   PT: 10.9 sec;   INR: 0.94 ratio    PTT - ( 25 Dec 2024 23:45 )  PTT:30.1 sec    RADIOLOGY & ADDITIONAL STUDIES:  EKG: SR at 41bpm; qRSD 104ms    A/P  54 year old male with CAD s/p inferior NSTEMI and PCI 2020 (to RCA, mLAD and dLCx), borderline HTN, and sinus bradycardia who is admitted with chest pain. EP consulted regarding sinus bradycardia    Awaiting [facility] today    - Avoid AV andrey blocking agents  - Continue MCOT  - Ambulate vigorously vs exercise ST for chronotropic competence  - In the absence of progressive symptoms, would continue close surveillance. Ultimately  mayrequire ppm implant, however, likely in his best interest to defer this until symptoms progress.  - Continue exercising, as marked reduction in exercise capacity may be another indication to consider early ppm implant.  - No other acute inpatient EP recommendations. Follow up as outpatient.  Seen by Dr. Adame on 24 for Sinus Bradycardia in office setting  Outpatient Cardiologist: Dr. Salas - now Dr. Chin    54 year old male with CAD s/p inferior NSTEMI and PCI 2020 (to RCA, mLAD and dLCx), borderline HTN, and sinus bradycardia who presented to the ER with chest pain. Seen by Dr. Adame recently:  He has a history of marked sinus bradycardia which had been asymptomatic. ECG in 2021 was notable for sinus bradycardia at 46 bpm with narrow QRS, and some ECGs with junctional escape rhythm as well. He has remained very active, and runs distances and participates in Triathalons. He monitors his HR's via smart watch and has noticed it takes longer for his HR's to accelerate with exercise (monitored HR's 120-145 BPM during workouts) and also notes that he can no longer run nearly as fast as he used to. However, he has not had increasing dyspnea, SOB, palpitations, dizziness or any recurrent syncopal episodes and feels well during workouts just "slower" than usual. He cut back on training in the last 4-6 weeks dye to this. Lowest HR's ~38-40BPM, resting. He did have an episode of syncope after [place] the [place] 1 year ago- he felt unwell after crossing the finish line, and ECG was apparently unremarkable. He went to [place], found to have electrolyte disturbance, low potassium, and dehydration- there was no cardiac concern at that time. He has not had any further dizziness or syncope.    Saw Dr. Salas but ultimately saw Dr. Chin. Had exercise stress test on Monday and had abnormal result and offered C on . Wore a Zio for one week. Results still pending. 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 He reports this is a big part of his life and his enjoyment. Would feel terrible if he couldn't be able to continue to do it.   ?  Recent TTE revealed mild global HK with LVEF 45-50%. A TTE in  after his PCI revealed LVEF 60-65%  ?  Cardiology Summary  EK2021, Sinus Bradycardia- Old inferior infarct  EC24: SB HR 58 BPM, PVCx 2    Remote/Ambulatory Rhythm Monitoring: Zio monitoring - (pending results).    Stress Test: 3/29/21 13 METS, small fixed defect in the basal inferior wall, no ischemia    Echo: 24 LVEF 45-50%, LA 4.2cm, mild LVH, mild global HK, trace MR, trace AI, trace TR, RVSP <25  TTE 2020 LVEF 60-65%, no RWMA    PAST MEDICAL & SURGICAL HISTORY:  Other tear of medial meniscus, current injury, right knee, initial encounter  Other tear of lateral meniscus as current injury  Hypertension  Patient with a history of Hypertension, patient reports has not take BP medications in the past 2 years  Gout  CAD (coronary artery disease)  HTN (hypertension)  Hyperlipidemia  CAD (coronary artery disease)  Gout  No significant past surgical history  H/O cardiac catheterization  x 5 stents  History of percutaneous coronary intervention    REVIEW OF SYSTEMS  General: - fever or chills, + fatigue	  Skin/Breast: - rashes  Ophthalmologic: - blurred vision	  ENMT: - sore throat  Respiratory and Thorax: - cough  Cardiovascular: + chest pain, - palpitations  Gastrointestinal: - N/V/D/C  Genitourinary: - dysuria  Musculoskeletal:	 - arthritis  Neurological: - weaknesses  Psychiatric: + anxiety 	    MEDICATIONS  (STANDING):  allopurinol 100 milliGRAM(s) Oral at bedtime  aspirin  chewable 81 milliGRAM(s) Oral daily  atorvastatin 80 milliGRAM(s) Oral at bedtime    Allergies  No Known Allergies    SOCIAL HISTORY: non smoker non drinker.     FAMILY HISTORY:  No pertinent family history in first degree relatives    Vital Signs Last 24 Hrs  T(C): 36.6 (26 Dec 2024 07:16), Max: 36.6 (26 Dec 2024 07:16)  T(F): 97.8 (26 Dec 2024 07:16), Max: 97.8 (26 Dec 2024 07:16)  HR: 41 (26 Dec 2024 07:16) (29 - 49)  BP: 144/92 (26 Dec 2024 07:16) (128/65 - 177/85)  RR: 16 (26 Dec 2024 07:16) (16 - 20)  SpO2: 99% (26 Dec 2024 07:16) (95% - 99%)    Parameters below as of 26 Dec 2024 07:16  Patient On (Oxygen Delivery Method): room air    Physical Exam:  Constitutional: AAOx3, NAD  Neck: supple, No JVD  Cardiovascular: +S1S2 RRR, no murmurs, rubs, gallops   Pulmonary: CTA b/l, unlabored, no wheezes, rales. No rhonchi  Abdomen: +BS, soft NTND  Extremities: no edema b/l,  Neuro: non focal, speech clear, DUNHAM x 4  Psych: appropriate mood and affect     LABS:                        14.9   6.72  )-----------( 157      ( 26 Dec 2024 04:50 )             43.1     142  |  104  |  18.6  ----------------------------<  93  3.9   |  25.0  |  0.85  Ca    8.4      26 Dec 2024 04:50  Mg     1.9       TPro  6.4[L]  /  Alb  3.9  /  TBili  0.6  /  DBili  x   /  AST  25  /  ALT  22  /  AlkPhos  81    LIVER FUNCTIONS - ( 26 Dec 2024 04:50 )  Alb: 3.9 g/dL / Pro: 6.4 g/dL / ALK PHOS: 81 U/L / ALT: 22 U/L / AST: 25 U/L / GGT: x         PT/INR - ( 25 Dec 2024 23:45 )   PT: 10.9 sec;   INR: 0.94 ratio    PTT - ( 25 Dec 2024 23:45 )  PTT:30.1 sec    RADIOLOGY & ADDITIONAL STUDIES:  EKG: SR at 41bpm; qRSD 104ms    A/P  54 year old male with CAD s/p inferior NSTEMI and PCI 2020 (to RCA, mLAD and dLCx), borderline HTN, and sinus bradycardia who is admitted with chest pain. EP consulted regarding sinus bradycardia    Awaiting [facility] today  Telemetry: SR with rates in the 40s with multifocal PVCs currently.     - Avoid AV andrey blocking agents  - [facility] pending  - In the absence of progressive symptoms, would continue close surveillance. Ultimately may require ppm implant if ischemia and PVCs are not considered culprits, however, likely in his best interest to defer this until symptoms progress.  - Continue exercising, as marked reduction in exercise capacity may be another indication to consider early ppm implant.  - Full recommendations pending. No other acute inpatient EP recommendations. May continue to follow up as outpatient.  Seen by Dr. Adame on 24 for Sinus Bradycardia in office setting  Outpatient Cardiologist: Dr. Salas - now Dr. Chin    54 year old male with CAD s/p inferior NSTEMI and PCI 2020 (to RCA, mLAD and dLCx), borderline HTN, and sinus bradycardia who presented to the ER with chest pain. Seen by Dr. Adame recently:  He has a history of marked sinus bradycardia which had been asymptomatic. ECG in 2021 was notable for sinus bradycardia at 46 bpm with narrow QRS, and some ECGs with junctional escape rhythm as well. He has remained very active, and runs distances and participates in Triathalons. He monitors his HR's via smart watch and has noticed it takes longer for his HR's to accelerate with exercise (monitored HR's 120-145 BPM during workouts) and also notes that he can no longer run nearly as fast as he used to. However, he has not had increasing dyspnea, SOB, palpitations, dizziness or any recurrent syncopal episodes and feels well during workouts just "slower" than usual. He cut back on training in the last 4-6 weeks dye to this. Lowest HR's ~38-40BPM, resting. He did have an episode of syncope after running the [place] 1 year ago- he felt unwell after crossing the finish line, and ECG was apparently unremarkable. He went to [place], found to have electrolyte disturbance, low potassium, and dehydration- there was no cardiac concern at that time. He has not had any further dizziness or syncope.    Saw Dr. Salas but ultimately saw Dr. Chin. Had exercise stress test on Monday and had abnormal result and offered C on . Wore a Zio for one week. Results still pending. 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 He reports this is a big part of his life and his enjoyment. Would feel terrible if he couldn't be able to continue to do it.   ?  Recent TTE revealed mild global HK with LVEF 45-50%. A TTE in  after his PCI revealed LVEF 60-65%  ?  Cardiology Summary  EK2021, Sinus Bradycardia- Old inferior infarct  EC24: SB HR 58 BPM, PVCx 2    Remote/Ambulatory Rhythm Monitoring: Zio monitoring - (pending results).    Stress Test: 3/29/21 13 METS, small fixed defect in the basal inferior wall, no ischemia    Echo: 24 LVEF 45-50%, LA 4.2cm, mild LVH, mild global HK, trace MR, trace AI, trace TR, RVSP <25  TTE 2020 LVEF 60-65%, no RWMA    PAST MEDICAL & SURGICAL HISTORY:  Other tear of medial meniscus, current injury, right knee, initial encounter  Other tear of lateral meniscus as current injury  Hypertension  Patient with a history of Hypertension, patient reports has not take BP medications in the past 2 years  Gout  CAD (coronary artery disease)  HTN (hypertension)  Hyperlipidemia  CAD (coronary artery disease)  Gout  No significant past surgical history  H/O cardiac catheterization  x 5 stents  History of percutaneous coronary intervention    REVIEW OF SYSTEMS  General: - fever or chills, + fatigue	  Skin/Breast: - rashes  Ophthalmologic: - blurred vision	  ENMT: - sore throat  Respiratory and Thorax: - cough  Cardiovascular: + chest pain, - palpitations  Gastrointestinal: - N/V/D/C  Genitourinary: - dysuria  Musculoskeletal:	 - arthritis  Neurological: - weaknesses  Psychiatric: + anxiety 	    MEDICATIONS  (STANDING):  allopurinol 100 milliGRAM(s) Oral at bedtime  aspirin  chewable 81 milliGRAM(s) Oral daily  atorvastatin 80 milliGRAM(s) Oral at bedtime    Allergies  No Known Allergies    SOCIAL HISTORY: non smoker non drinker.     FAMILY HISTORY:  No pertinent family history in first degree relatives    Vital Signs Last 24 Hrs  T(C): 36.6 (26 Dec 2024 07:16), Max: 36.6 (26 Dec 2024 07:16)  T(F): 97.8 (26 Dec 2024 07:16), Max: 97.8 (26 Dec 2024 07:16)  HR: 41 (26 Dec 2024 07:16) (29 - 49)  BP: 144/92 (26 Dec 2024 07:16) (128/65 - 177/85)  RR: 16 (26 Dec 2024 07:16) (16 - 20)  SpO2: 99% (26 Dec 2024 07:16) (95% - 99%)    Parameters below as of 26 Dec 2024 07:16  Patient On (Oxygen Delivery Method): room air    Physical Exam:  Constitutional: AAOx3, NAD  Neck: supple, No JVD  Cardiovascular: +S1S2 RRR, no murmurs, rubs, gallops   Pulmonary: CTA b/l, unlabored, no wheezes, rales. No rhonchi  Abdomen: +BS, soft NTND  Extremities: no edema b/l,  Neuro: non focal, speech clear, DUNHAM x 4  Psych: appropriate mood and affect     LABS:                        14.9   6.72  )-----------( 157      ( 26 Dec 2024 04:50 )             43.1     142  |  104  |  18.6  ----------------------------<  93  3.9   |  25.0  |  0.85  Ca    8.4      26 Dec 2024 04:50  Mg     1.9       TPro  6.4[L]  /  Alb  3.9  /  TBili  0.6  /  DBili  x   /  AST  25  /  ALT  22  /  AlkPhos  81    LIVER FUNCTIONS - ( 26 Dec 2024 04:50 )  Alb: 3.9 g/dL / Pro: 6.4 g/dL / ALK PHOS: 81 U/L / ALT: 22 U/L / AST: 25 U/L / GGT: x         PT/INR - ( 25 Dec 2024 23:45 )   PT: 10.9 sec;   INR: 0.94 ratio    PTT - ( 25 Dec 2024 23:45 )  PTT:30.1 sec    RADIOLOGY & ADDITIONAL STUDIES:  EKG: SR at 41bpm; qRSD 104ms    TTE 24  CONCLUSIONS:   1. Left ventricular systolic function is mildly decreased with an ejection fraction of 52 % by Ricks's method of disks with an ejection fraction visually estimated at 45 to 50 %.   2. Left atrium is mildly dilated.   3. The right atrium is dilated.   4. Mild mitral regurgitation.    A/P  54 year old male with CAD s/p inferior NSTEMI and PCI 2020 (to RCA, mLAD and dLCx), borderline HTN, and sinus bradycardia who is admitted with chest pain. EP consulted regarding sinus bradycardia and PVCs.     Awaiting [facility] today  Telemetry: SR with rates in the 40s with multifocal PVCs currently.   Zio with HR variability from average HR 54 to 141bpm. PVC burden at 19.4%  12 Lead EKG: PVC morphology: RBBB; superior axis; transition after V3 - anterolateral Pap muscle?    - LHC resulted with no new obstructive disease  - Given high PVC burden (19.4%) consider cardiac MRI.   - Consider ACE/ARB given HF. Can recommend very low dose beta blocker for PVC suppression. No recommendations for AAD at this time.   - Patient chronotropically competent on last stress test with 17.2 METS - unlikely symptoms would improve with pacemaker insertion. Would continue close surveillance. Ultimately may require ppm implant if ischemia and PVCs are not considered culprits, however, likely in his best interest to defer this until symptoms progress.  - Continue exercising for now.   - Full recommendations pending. No other acute inpatient EP recommendations. May continue to follow up as outpatient.  Seen by Dr. Adame on 24 for Sinus Bradycardia in office setting  Outpatient Cardiologist: Dr. Salas - now Dr. Chin    54 year old male with CAD s/p inferior NSTEMI and PCI 2020 (to RCA, mLAD and dLCx), borderline HTN, and sinus bradycardia who presented to the ER with chest pain. Seen by Dr. Adame recently:  He has a history of marked sinus bradycardia which had been asymptomatic. ECG in 2021 was notable for sinus bradycardia at 46 bpm with narrow QRS, and some ECGs with junctional escape rhythm as well. He has remained very active, and runs distances and participates in Triathalons. He monitors his HR's via smart watch and has noticed it takes longer for his HR's to accelerate with exercise (monitored HR's 120-145 BPM during workouts) and also notes that he can no longer run nearly as fast as he used to. However, he has not had increasing dyspnea, SOB, palpitations, dizziness or any recurrent syncopal episodes and feels well during workouts just "slower" than usual. He cut back on training in the last 4-6 weeks dye to this. Lowest HR's ~38-40BPM, resting. He did have an episode of syncope after running the [place] 1 year ago- he felt unwell after crossing the finish line, and ECG was apparently unremarkable. He went to [place], found to have electrolyte disturbance, low potassium, and dehydration- there was no cardiac concern at that time. He has not had any further dizziness or syncope.    Saw Dr. Salas but ultimately saw Dr. Chin. Had exercise stress test on Monday and had abnormal result and offered C on . Wore a Zio for one week. Results still pending. 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 Would feel terrible if he couldn't be able to continue to do it.   ?  Recent TTE revealed mild global HK with LVEF 45-50%. A TTE in  after his PCI revealed LVEF 60-65%  ?  Cardiology Summary  EK2021, Sinus Bradycardia- Old inferior infarct  EC24: SB HR 58 BPM, PVCx 2    Remote/Ambulatory Rhythm Monitoring: Zio monitoring - (pending results).    Stress Test: 3/29/21 13 METS, small fixed defect in the basal inferior wall, no ischemia    Echo: 24 LVEF 45-50%, LA 4.2cm, mild LVH, mild global HK, trace MR, trace AI, trace TR, RVSP <25  TTE 2020 LVEF 60-65%, no RWMA    PAST MEDICAL & SURGICAL HISTORY:  Other tear of medial meniscus, current injury, right knee, initial encounter  Other tear of lateral meniscus as current injury  Hypertension  Patient with a history of Hypertension, patient reports has not take BP medications in the past 2 years  Gout  CAD (coronary artery disease)  HTN (hypertension)  Hyperlipidemia  CAD (coronary artery disease)  Gout  No significant past surgical history  H/O cardiac catheterization  x 5 stents  History of percutaneous coronary intervention    REVIEW OF SYSTEMS  General: - fever or chills, + fatigue	  Skin/Breast: - rashes  Ophthalmologic: - blurred vision	  ENMT: - sore throat  Respiratory and Thorax: - cough  Cardiovascular: + chest pain, - palpitations  Gastrointestinal: - N/V/D/C  Genitourinary: - dysuria  Musculoskeletal:	 - arthritis  Neurological: - weaknesses  Psychiatric: + anxiety 	    MEDICATIONS  (STANDING):  allopurinol 100 milliGRAM(s) Oral at bedtime  aspirin  chewable 81 milliGRAM(s) Oral daily  atorvastatin 80 milliGRAM(s) Oral at bedtime    Allergies  No Known Allergies    SOCIAL HISTORY: non smoker non drinker.     FAMILY HISTORY:  No pertinent family history in first degree relatives    Vital Signs Last 24 Hrs  T(C): 36.6 (26 Dec 2024 07:16), Max: 36.6 (26 Dec 2024 07:16)  T(F): 97.8 (26 Dec 2024 07:16), Max: 97.8 (26 Dec 2024 07:16)  HR: 41 (26 Dec 2024 07:16) (29 - 49)  BP: 144/92 (26 Dec 2024 07:16) (128/65 - 177/85)  RR: 16 (26 Dec 2024 07:16) (16 - 20)  SpO2: 99% (26 Dec 2024 07:16) (95% - 99%)    Parameters below as of 26 Dec 2024 07:16  Patient On (Oxygen Delivery Method): room air    Physical Exam:  Constitutional: AAOx3, NAD  Neck: supple, No JVD  Cardiovascular: +S1S2 RRR, no murmurs, rubs, gallops   Pulmonary: CTA b/l, unlabored, no wheezes, rales. No rhonchi  Abdomen: +BS, soft NTND  Extremities: no edema b/l,  Neuro: non focal, speech clear, DUNHAM x 4  Psych: appropriate mood and affect     LABS:                        14.9   6.72  )-----------( 157      ( 26 Dec 2024 04:50 )             43.1     142  |  104  |  18.6  ----------------------------<  93  3.9   |  25.0  |  0.85  Ca    8.4      26 Dec 2024 04:50  Mg     1.9       TPro  6.4[L]  /  Alb  3.9  /  TBili  0.6  /  DBili  x   /  AST  25  /  ALT  22  /  AlkPhos  81    LIVER FUNCTIONS - ( 26 Dec 2024 04:50 )  Alb: 3.9 g/dL / Pro: 6.4 g/dL / ALK PHOS: 81 U/L / ALT: 22 U/L / AST: 25 U/L / GGT: x         PT/INR - ( 25 Dec 2024 23:45 )   PT: 10.9 sec;   INR: 0.94 ratio    PTT - ( 25 Dec 2024 23:45 )  PTT:30.1 sec    RADIOLOGY & ADDITIONAL STUDIES:  EKG: SR at 41bpm; qRSD 104ms    TTE 24  CONCLUSIONS:   1. Left ventricular systolic function is mildly decreased with an ejection fraction of 52 % by Ricks's method of disks with an ejection fraction visually estimated at 45 to 50 %.   2. Left atrium is mildly dilated.   3. The right atrium is dilated.   4. Mild mitral regurgitation.    A/P  54 year old male with CAD s/p inferior NSTEMI and PCI 2020 (to RCA, mLAD and dLCx), borderline HTN, and sinus bradycardia who is admitted with chest pain. EP consulted regarding sinus bradycardia and PVCs.     Awaiting [facility] today  Telemetry: SR with rates in the 40s with multifocal PVCs currently.   Zio with HR variability from average HR 54 to 141bpm. PVC burden at 19.4%  12 Lead EKG: PVC morphology: RBBB; superior axis; transition after V3 - postero-medial Pap muscle?    - LHC resulted with no new obstructive disease  - Given high PVC burden (19.4%) consider cardiac MRI - may be performed as outpatient   - Consider ACE/ARB given HF. No recommendations for AAD at this time.   - Patient chronotropically competent on last stress test with 17.2 METS - unlikely symptoms would improve with pacemaker insertion. Would continue close surveillance. Ultimately may require ppm implant if ischemia and PVCs are not considered culprits, however, likely in his best interest to defer this until symptoms progress.  - Continue exercising for now.   - Full recommendations pending. No other acute inpatient EP recommendations. Will arrange outpatient EPS/RFA of PVCs in near future. cMRI can be ordered but not critical in procedure timing.   - No objection to discharge from EP perspective.

## 2024-12-26 NOTE — DISCHARGE NOTE PROVIDER - HOSPITAL COURSE
54 year old male with CAD s/p inferior NSTEMI and PCI 8/2020 (to RCA, mLAD and dLCx), borderline HTN, and sinus bradycardia who presented to the ER with chest pain. He has remained very active, and runs distances and participates in Triathalons. He monitors his HR's via smart watch and has noticed it takes longer for his HR's to accelerate with exercise (monitored HR's 120-145 BPM during workouts) and also notes that he can no longer run nearly as fast as he used to. However, he has not had increasing dyspnea, SOB, palpitations, dizziness or any recurrent syncopal episodes and feels well during workouts just "slower" than usual. He cut back on training in the last 4-6 weeks dye to this. Lowest HR's ~38-40BPM, resting. He did have an episode of syncope after running the [place] 1 year ago- he felt unwell after crossing the finish line, and ECG was apparently unremarkable. He went to [employer], found to have electrolyte disturbance, low potassium, and dehydration- there was no cardiac concern at that time. He has not had any further dizziness or syncope.    Saw Dr. Salas but ultimately saw Dr. Chin. Had exercise stress test on Monday and had abnormal result and offered LHC on 12/27.  TTE revealed mild global HK with LVEF 45-50%. A TTE in 2020 after his PCI revealed LVEF 60-65%. Zio with HR variability from average HR 54 to 141bpm. PVC burden at 19.4%  12 Lead EKG: PVC morphology: RBBB; superior axis; transition after V3 - postero-medial Pap muscle?    LHC performed via RRA revealing: patent stents RCA and LAD, mid circ 40% unchanged.   EP consulted for high PVC burden on outpatient zio monitor 19.4%.     PLAN:  1. CAD Post Cath   -patent stents RCA and LAD, mid circ 40% unchanged.   -Formal cath report pending  -Post procedure management/monitoring per protocol  -Access site precautions discussed with patient and wife.   -Repeat ECG if any clinical indication or change on tele  -Continue current medical therapy  -Continue aspirin 81mg daily  -No BB due to low HR  -Given newly reduced EF 45-50% (prior TTE 11/2023 EF 60-65%) losartan 25mg daily started.  -Cont statin therapy with Lipitor 80mg PO QHS  -Educated regarding post procedure management and care  -Discussed the importance of RF modification  -F/U outpt in 1-2 weeks with Cardiologist Dr. Anne and Dr. Dugan  -DISPO: Discussed with Dr. Stoddard; Dr. Anne (EP) and Dr. Chavez, ok to discharge home today      2. PVC's  -EP consulted  -Plan for outpatient cardiac MRI to r/o infiltrative process other arrhythmogenic substrate   -patient to be set up for outpatient PVC ablation. Patient to follow up with Dr. Dangelo Aldridge in 1-2 weeks of discharge  -Given his history of CAD with indwelling stents precludes class 1C agents, and amiodarone will be undesirable long term given his young age. BB may be limited by a low baseline rate and patient's avid athletic predispositions, leaving Sotalol as the main drug alternative. Patient wishes to proceed with outpatient PVC ablation.        3. Low EF:  -patient appears euvolemic, no orthopnea, no leg edema, CXR: no effusion; no pneumothorax; no consolidation  -proBNP 163  -losartan 25mg daily started.

## 2024-12-26 NOTE — CONSULT NOTE ADULT - ASSESSMENT
54 y.o. male with PMHx CAD s/p PCI (5 stents in 2020; RCA, mLAD, dLCx), HTN, HLD presents with chest pain. Pt notes pain radiated to left shoulder. Pt noted to be bradycardic in ED. Pt with known history of bradycardia with baseline 40-50s. Pt is very active, exercises regularly, participates in triathlons. Pt states since October noted to have worsening bradycardia and decreases exercise tolerance. Pt was seen outpatient 12/11 to follow up on bradycardia. Zio monitor from 12/9 - 12/13, results not available. Pt states he recently switched cardiologist and followed up with Dr. Dugan. Per patient, NST performed outpatient was abnormal and pt was scheduled for LHC at [facility] for Friday 12/27. Pt took ASA 81 mg this AM and additional 243mg given per ED team. Chest pain resolved, remains bradycardic 30-40s. Denies back pain, headache, dizziness, SOB, TEE, diaphoresis, syncope or N/V.

## 2024-12-26 NOTE — H&P ADULT - NSHPREVIEWOFSYSTEMS_GEN_ALL_CORE
REVIEW OF SYSTEMS:    CONSTITUTIONAL: No weakness, fevers or chills  EYES: No vertigo or throat pain  ENT: No visual changes, eye pain  MOUTH: moist frandy mucosal, no mouth ulcers  NECK: No pain or stiffness  RESPIRATORY: No cough, wheezing, hemoptysis; No shortness of breath  CARDIOVASCULAR: +CP  GASTROINTESTINAL: No abdominal or epigastric pain. No nausea, vomiting, or hematemesis  GENITOURINARY: No dysuria, frequency or hematuria  NEUROLOGICAL: No numbness or weakness  SKIN: No itching, rashes  PSYCH: No anxiety or depression

## 2024-12-26 NOTE — DISCHARGE NOTE PROVIDER - NSDCMRMEDTOKEN_GEN_ALL_CORE_FT
allopurinol 100 mg oral tablet: 1 tab(s) orally once a day (at bedtime)  aspirin 81 mg oral tablet, chewable: 1 tab(s) orally once a day  atorvastatin 80 mg oral tablet: 1 tab(s) orally once a day (at bedtime)   allopurinol 100 mg oral tablet: 1 tab(s) orally once a day (at bedtime)  aspirin 81 mg oral tablet, chewable: 1 tab(s) orally once a day  atorvastatin 80 mg oral tablet: 1 tab(s) orally once a day (at bedtime)  losartan 25 mg oral tablet: 1 tab(s) orally once a day

## 2024-12-26 NOTE — CONSULT NOTE ADULT - NS ATTEND AMEND GEN_ALL_CORE FT
examined at bed side, feels well now.   Recent abnormal NST, H/O CAD/MI/PCI. Presents also with bradycardia, PVCs, and CP.   TTE with mildly reduced LVEF.   For C today, EP consulted as well. examined at bed side, feels well now.   Recent abnormal NST, H/O CAD/MI/PCI. Presents also with bradycardia, PVCs, and CP.   TTE with mildly reduced LVEF.   For [facility] today, EP consulted as well.    addendum 2:08 PM  Cath no critical cor lesions  EP on board  Consider BB with EPI, outpatient CMR, outpt EP follow up re> PVC ablation (has MCOT recent with PVC burder 20%).   Gen cards signing off.

## 2024-12-26 NOTE — ED ADULT NURSE REASSESSMENT NOTE - NS ED NURSE REASSESS COMMENT FT1
assumed care of pt at 0715. report received from BRIAN Amos. charting as noted. rr even and unlabored. anox4. pt continues to c/o of chest pain. cardiac monitoring in place, pt currently on pads. MD is aware of heart rate. pt educated on plan of care, pt able to successfully teach back plan of care to RN, RN will continue to reeducate pt during hospital stay. awaiting cath
patient urinated in urinal, updatedon plan, mild chest discomfort remains, awaiting cards.
1 severity pain, has improved, patient remains sinus blake with unifocal pvcs, occasional MD Wayne kearney aware.

## 2024-12-26 NOTE — CONSULT NOTE ADULT - PROBLEM SELECTOR RECOMMENDATION 3
Plan as above  - Hx of HTN but not on any BP meds, managed with diet and exercise  - Avoid BB and CCB in the setting of bradycardia   - Consider lisinopril or losartan if BP management needed  - Lifestyle modifications (DASH diet, daily exercise encouraged, weight loss, limit ETOH intake, avoid NSAIDs)

## 2024-12-26 NOTE — DISCHARGE NOTE NURSING/CASE MANAGEMENT/SOCIAL WORK - PATIENT PORTAL LINK FT
You can access the FollowMyHealth Patient Portal offered by [facility] by registering at the following website: [email or web address]. By joining [employer]’s FollowMyHealth portal, you will also be able to view your health information using other applications (apps) compatible with our system.

## 2024-12-26 NOTE — CONSULT NOTE ADULT - SUBJECTIVE AND OBJECTIVE BOX
[facility]                                              CARDIOLOGY AT [place] [address]                                             Telephone: [phone]. Fax:[phone]                                                       CARDIOLOGY CONSULTATION NOTE                                                                                             History obtained by: Patient and medical record  Community Cardiologist:    obtained: Yes [  ] No [  ]  Reason for Consultation:   Available out pt records reviewed: Yes [  ] No [  ]    Chief complaint:    Patient is a 54y old  Male who presents with a chief complaint of     HPI:      CARDIAC TESTING   ECHO:    STRESS:    CATH:     ELECTROPHYSIOLOGY:     PAST MEDICAL HISTORY  No pertinent past medical history    Other tear of medial meniscus, current injury, right knee, initial encounter    Other tear of lateral meniscus as current injury    Hypertension    Gout    Gout    CAD (coronary artery disease)    HTN (hypertension)    Hyperlipidemia    CAD (coronary artery disease)    Gout        PAST SURGICAL HISTORY  No significant past surgical history    H/O cardiac catheterization    History of percutaneous coronary intervention        SOCIAL HISTORY:  Denies smoking/alcohol/drugs  CIGARETTES:     ALCOHOL:  DRUGS:    FAMILY HISTORY:  No pertinent family history in first degree relatives      Family History of Cardiovascular Disease:  Yes [  ] No [  ]  Coronary Artery Disease in first degree relative: Yes [  ] No [  ]  Sudden Cardiac Death in First degree relative: Yes [  ] No [  ]    HOME MEDICATIONS:  allopurinol 100 mg oral tablet: 1 tab(s) orally once a day (at bedtime) (25 Dec 2024 23:54)      CURRENT CARDIAC MEDICATIONS:      CURRENT OTHER MEDICATIONS:      ALLERGIES:   No Known Allergies      REVIEW OF SYMPTOMS:   CONSTITUTIONAL: No fever, no chills, no weight loss, no weight gain, no fatigue   ENMT:  No vertigo; No sinus or throat pain  NECK: No pain or stiffness  CARDIOVASCULAR: No chest pain, no dyspnea, no syncope/presyncope, no palpitations, no dizziness, no orthopnea, no paroxsymal nocturnal dyspnea  RESPIRATORY: No shortness of breath, no cough, no wheezing  : No dysuria, no hematuria   GI: No dark color stool, no nausea, no diarrhea, no constipation, no abdominal pain   NEURO: No headache, no slurred speech   MUSCULOSKELETAL: No joint pain or swelling; No muscle, back, or extremity pain  PSYCH: No agitation, no anxiety  ALL OTHER REVIEW OF SYSTEMS ARE NEGATIVE.    VITAL SIGNS:  T(C): 36.4 (12-25-24 @ 23:28), Max: 36.4 (12-25-24 @ 23:28)  T(F): 97.6 (12-25-24 @ 23:28), Max: 97.6 (12-25-24 @ 23:28)  HR: 36 (12-26-24 @ 03:42) (29 - 49)  BP: 159/86 (12-26-24 @ 03:42) (131/85 - 177/85)  RR: 16 (12-26-24 @ 03:42) (16 - 20)  SpO2: 98% (12-26-24 @ 03:42) (97% - 99%)    INTAKE AND OUTPUT:       PHYSICAL EXAM:  Constitutional: Comfortable. No acute distress.   HEENT: Atraumatic and normocephalic, neck is supple. No JVD. No carotid bruit.  CNS: A&Ox3. No focal deficits.   Respiratory: CTAB, unlabored   Cardiovascular: RRR normal S1 S2. No murmur. No rubs or gallop.  Gastrointestinal: Soft, non-tender. +Bowel sounds.   Extremities: 2+ Peripheral Pulses, No clubbing, cyanosis, or edema  Psychiatric: Calm. No agitation.   Skin: Warm and dry, no ulcers on extremities     LABS:                            15.3   7.99  )-----------( 175      ( 25 Dec 2024 23:45 )             44.8     12-25    139  |  102  |  22.7[H]  ----------------------------<  93  3.9   |  27.0  |  1.07    Ca    8.6      25 Dec 2024 23:45  Mg     2.0     12-25    TPro  6.7  /  Alb  4.0  /  TBili  0.3[L]  /  DBili  x   /  AST  25  /  ALT  22  /  AlkPhos  99  12-25    PT/INR - ( 25 Dec 2024 23:45 )   PT: 10.9 sec;   INR: 0.94 ratio         PTT - ( 25 Dec 2024 23:45 )  PTT:30.1 sec  Urinalysis Basic - ( 25 Dec 2024 23:45 )    Color: x / Appearance: x / SG: x / pH: x  Gluc: 93 mg/dL / Ketone: x  / Bili: x / Urobili: x   Blood: x / Protein: x / Nitrite: x   Leuk Esterase: x / RBC: x / WBC x   Sq Epi: x / Non Sq Epi: x / Bacteria: x          Thyroid Stimulating Hormone, Serum: 1.84 uIU/mL (12-25-24 @ 23:45)      INTERPRETATION OF TELEMETRY:     ECG:   Prior ECG: Yes [  ] No [  ]    RADIOLOGY & ADDITIONAL STUDIES:   X-ray:    CT scan:   MRI:   US:                                              [facility]                                              CARDIOLOGY AT [place] [address]                                             Telephone: [phone]. Fax:[phone]                                                       CARDIOLOGY CONSULTATION NOTE                                                                                             History obtained by: Patient and medical record  Community Cardiologist: Dr. Dugan   obtained: Yes [  ] No [ x ]  Reason for Consultation: Chest Pain  Available out pt records reviewed: Yes [ x ] No [  ]    Chief complaint:    Patient is a 54y old Male who presents with a chief complaint of chest pain    HPI:  54 y.o. male with PMHx CAD s/p PCI (5 stents in 2020; RCA, mLAD, dLCx), HTN, HLD presents with chest pain. Pt notes pain radiated to left shoulder. Pt noted to be bradycardic in ED. Pt with known history of bradycardia with baseline 40-50s. Pt is very active, exercises regularly, participates in triathlons. Pt states since October noted to have worsening bradycardia and decreases exercise tolerance. Pt was seen outpatient 12/11 to follow up on bradycardia. Zio monitor from 12/9 - 12/13, results not available. Pt states he recently switched cardiologist and followed up with Dr. Dugan. Per patient, NST performed outpatient was abnormal and pt was scheduled for [facility] for Friday 12/27. Pt took ASA 81 mg this AM and additional 243mg given per ED team. Chest pain resolved, remains bradycardic 30-40s. Denies back pain, headache, dizziness, SOB, TEE, diaphoresis, syncope or N/V.     CARDIAC TESTING   ECHO: Pending    < from: TTE Echo Complete w/ Contrast w/ Doppler (11.06.23 @ 12:05) >  CONCLUSIONS:     1. Normal left ventricular size and systolic function.   2. Dilated right ventricular size.   3. Mildly dilated left atrium.   4. No significant valvular disease.   5. No evidence of pulmonary hypertension.   6. No pericardial effusion.   7. Dilated IVC.   8. No prior echo is available for comparison.    < end of copied text >    STRESS:  Per pt, NST performed with Dr. Dugan abnormal. Obtain records.     NST performed 3/29/2021  Small fixed defect involving basal inferior wall. No evidence of ischemia.     CATH:   < from: Cardiac Cath Lab - Adult (09.04.20 @ 15:30) >  CORONARY VESSELS: The coronary circulation is right dominant.  LM:   --  LM: Normal.  LAD:   --  Proximal LAD: There was a 30 % stenosis. iFR 0.96. In a second  lesion, there was a 0 % stenosis in-stent.  --  Mid LAD: There was a 0 % stenosis in-stent.  CX:   --  Mid circumflex: There was a 40 % stenosis.  --  Distal circumflex: There was a 0 % stenosis in-stent.  RCA:   --  Mid RCA: There was a 40 % stenosis. In a second lesion, there  was a 0 % stenosis in-stent.  --  Distal RCA: There was a 0 % stenosis in-stent.  COMPLICATIONS: There were no complications.  DIAGNOSTIC IMPRESSIONS: Patent stents.  Proximal to LAD stent, ? of spasm with catheter engagement. iFR negative.  Normal LV function by TTE.    < end of copied text >    ELECTROPHYSIOLOGY:   Zio Monitor 12/9 - 12/13; results not yet available     PAST MEDICAL HISTORY  No pertinent past medical history  Other tear of medial meniscus, current injury, right knee, initial encounter  Other tear of lateral meniscus as current injury  Hypertension  Gout  Gout  CAD (coronary artery disease)  HTN (hypertension)  Hyperlipidemia  CAD (coronary artery disease)  Gout    PAST SURGICAL HISTORY  No significant past surgical history  H/O cardiac catheterization  History of percutaneous coronary intervention      SOCIAL HISTORY:    CIGARETTES:   Former chewing tobacco use  ALCOHOL:  DRUGS:    FAMILY HISTORY:  No pertinent family history in first degree relatives  Adopted    Family History of Cardiovascular Disease:  Yes [  ] No [  ]  Coronary Artery Disease in first degree relative: Yes [  ] No [  ]  Sudden Cardiac Death in First degree relative: Yes [  ] No [  ]    HOME MEDICATIONS:  allopurinol 100 mg oral tablet: 1 tab(s) orally once a day (at bedtime) (25 Dec 2024 23:54)      CURRENT CARDIAC MEDICATIONS:      CURRENT OTHER MEDICATIONS:      ALLERGIES:   No Known Allergies      REVIEW OF SYMPTOMS:   CONSTITUTIONAL: No fever, no chills, no weight loss, no weight gain, no fatigue   ENMT:  No vertigo; No sinus or throat pain  NECK: No pain or stiffness  CARDIOVASCULAR: +Chest pain. No dyspnea, no syncope/presyncope, no palpitations, no dizziness, no orthopnea, no paroxsymal nocturnal dyspnea  RESPIRATORY: No shortness of breath, no cough, no wheezing  : No dysuria, no hematuria   GI: No dark color stool, no nausea, no diarrhea, no constipation, no abdominal pain   NEURO: No headache, no slurred speech   MUSCULOSKELETAL: No joint pain or swelling; No muscle, back, or extremity pain  PSYCH: No agitation, no anxiety  ALL OTHER REVIEW OF SYSTEMS ARE NEGATIVE.    VITAL SIGNS:  T(C): 36.4 (12-25-24 @ 23:28), Max: 36.4 (12-25-24 @ 23:28)  T(F): 97.6 (12-25-24 @ 23:28), Max: 97.6 (12-25-24 @ 23:28)  HR: 36 (12-26-24 @ 03:42) (29 - 49)  BP: 159/86 (12-26-24 @ 03:42) (131/85 - 177/85)  RR: 16 (12-26-24 @ 03:42) (16 - 20)  SpO2: 98% (12-26-24 @ 03:42) (97% - 99%)    INTAKE AND OUTPUT:       PHYSICAL EXAM:  Constitutional: Comfortable. No acute distress.   HEENT: Atraumatic and normocephalic, neck is supple. No JVD. No carotid bruit.  CNS: A&Ox3. No focal deficits.   Respiratory: CTAB, unlabored   Cardiovascular: +Bradycardic. S1 S2. No murmur. No rubs or gallop.  Gastrointestinal: Soft, non-tender. +Bowel sounds.   Extremities: 2+ Peripheral Pulses, No clubbing, cyanosis, or edema  Psychiatric: Calm. No agitation.   Skin: Warm and dry, no ulcers on extremities     LABS:                       15.3   7.99  )-----------( 175      ( 25 Dec 2024 23:45 )             44.8     12-25    139  |  102  |  22.7[H]  ----------------------------<  93  3.9   |  27.0  |  1.07    Ca    8.6      25 Dec 2024 23:45  Mg     2.0     12-25    TPro  6.7  /  Alb  4.0  /  TBili  0.3[L]  /  DBili  x   /  AST  25  /  ALT  22  /  AlkPhos  99  12-25    PT/INR - ( 25 Dec 2024 23:45 )   PT: 10.9 sec;   INR: 0.94 ratio         PTT - ( 25 Dec 2024 23:45 )  PTT:30.1 sec  Urinalysis Basic - ( 25 Dec 2024 23:45 )    Color: x / Appearance: x / SG: x / pH: x  Gluc: 93 mg/dL / Ketone: x  / Bili: x / Urobili: x   Blood: x / Protein: x / Nitrite: x   Leuk Esterase: x / RBC: x / WBC x   Sq Epi: x / Non Sq Epi: x / Bacteria: x      Thyroid Stimulating Hormone, Serum: 1.84 uIU/mL (12-25-24 @ 23:45)      INTERPRETATION OF TELEMETRY: SB 30-40s, PVC    ECG: Initial ECG blake with bigeminy; repeat ECG blake @ 41bpm bigem resolved    Prior ECG: Yes [ x ] No [  ]    RADIOLOGY & ADDITIONAL STUDIES:   X-ray:  Pending

## 2024-12-26 NOTE — H&P ADULT - NSICDXPASTSURGICALHX_GEN_ALL_CORE_FT
PAST SURGICAL HISTORY:  H/O cardiac catheterization x 5 stents    History of percutaneous coronary intervention     No significant past surgical history

## 2024-12-26 NOTE — DISCHARGE NOTE PROVIDER - CARE PROVIDER_API CALL
Dangelo Aldridge  Cardiac Electrophysiology  [address] [place] [address]  Phone: [phone]  Fax: [phone]  Follow Up Time: 1 week   Dangelo Aldridge  Cardiac Electrophysiology  [address] [place] [address]  Phone: [phone]  Fax: [phone]  Follow Up Time: 1 week    Jorden Dugan  Cardiovascular Disease  [address] [place] [address]  Phone: [phone]  Fax: [phone]  Established Patient  Follow Up Time: 1 week

## 2024-12-26 NOTE — CONSULT NOTE ADULT - SUBJECTIVE AND OBJECTIVE BOX
[facility]                                              INTERVENTIONAL CARDIOLOGY AT [place] [address]                                             Telephone: [phone]. Fax:[phone]                                                       INTERVENTIONAL CARDIOLOGY CONSULTATION NOTE                                                                                             History obtained by: Patient and medical record  Community Cardiologist:   Reason for Consultation: Evaluation for cardiac catheterization  Available pt records reviewed: Yes [ x ] No [  ]    Chief complaint:    Patient is a 54y old  Male who presents with a chief complaint of LHC (26 Dec 2024 10:26)      HPI:  54 y.o. male with PMHx CAD s/p PCI (5 stents in 2020; RCA, mLAD, dLCx), gout, HLD, h/o bradycardia pw Chest pain. Reports 2 months onset of decreased exercise tolerance. Underwent stress test with outpatient cardiologist, which was abnormal and was told to get LHC at [place] on Friday. Noted persistent chest pain with radiation to the left arm last night and came for further eval. ROS negative for fever, chill, SOB, n/v/d/c nor urinary concerns.     ED vitals notable for bradycardia, admitted for C .  (26 Dec 2024 08:36)      Anginal Class:        Angina (Class):        Ischemic Symptoms:     Heart Failure:        Systolic/Diastolic/Combined:        NYHA Class (within 2 weeks):       PAST MEDICAL HISTORY  No pertinent past medical history    Other tear of medial meniscus, current injury, right knee, initial encounter    Other tear of lateral meniscus as current injury    Hypertension    Gout    Gout    CAD (coronary artery disease)    HTN (hypertension)    Hyperlipidemia    CAD (coronary artery disease)    Gout        Associated Risk Factors:        Frailty Assessment: (none/mild/mod/severe):       Cerebrovascular Disease: N/A       Chronic Lung Disease: N/A       Peripheral Arterial Disease: N/A       Chronic Kidney Disease (if yes, what is GFR): N/A       Uncontrolled Diabetes (if yes, what is HgbA1C or FBS): N/A       Poorly Controlled Hypertension (if yes, what is SBP): N/A       Morbid Obesity (if yes, what is BMI): N/A       History of Recent Ventricular Arrhythmia: N/A       Inability to Ambulate Safely: N/A       Need for Therapeutic Anticoagulation: N/A       Antiplatelet or Contrast Allergy: N/A      PAST SURGICAL HISTORY  No significant past surgical history    H/O cardiac catheterization    History of percutaneous coronary intervention        SOCIAL HISTORY:  ***    FAMILY HISTORY:  No pertinent family history in first degree relatives      Family History of Premature Cardiovascular Disease:  Yes [  ] No [  ]    HOME MEDICATIONS:  allopurinol 100 mg oral tablet: 1 tab(s) orally once a day (at bedtime) (25 Dec 2024 23:54)      CURRENT CARDIAC MEDICATIONS:      Antianginal Therapies:        Beta Blockers:         Calcium Channel Blockers:        Long Acting Nitrates:        Ranexa:     ALLERGIES:   No Known Allergies      REVIEW OF SYMPTOMS:   CONSTITUTIONAL: o fever, no chills, no weight loss, no weight gain, no fatigue   CARDIOVASCULAR: ***  RESPIRATORY: no Shortness of breath, no cough, no wheezing  : No dysuria, no hematuria   GI: No dark color stool, no nausea, no diarrhea, no constipation, no abdominal pain   NEURO: No headache, no slurred speech   ALL OTHER REVIEW OF SYSTEMS ARE NEGATIVE.    VITAL SIGNS:  T(C): 36.3 (12-26-24 @ 10:45), Max: 36.6 (12-26-24 @ 07:16)  T(F): 97.4 (12-26-24 @ 10:45), Max: 97.8 (12-26-24 @ 07:16)  HR: 38 (12-26-24 @ 10:45) (29 - 49)  BP: 159/91 (12-26-24 @ 10:45) (128/65 - 177/85)  RR: 16 (12-26-24 @ 10:45) (16 - 20)  SpO2: 95% (12-26-24 @ 10:45) (95% - 99%)    INTAKE AND OUTPUT:       PHYSICAL EXAM:  Constitutional: Comfortable . No acute distress.   HEENT: Atraumatic and normocephalic , neck is supple . no JVD. No carotid bruit.  CNS: A&Ox3. No focal deficits.   Respiratory: CTAB, unlabored   Cardiovascular: RRR normal s1 s2. No murmur. No rubs or gallop.  Gastrointestinal: Soft, non-tender. +Bowel sounds.   Extremities: 2+ Peripheral Pulses, No clubbing, cyanosis, or edema  Psychiatric: Calm . no agitation.   Skin: Warm and dry, no ulcers on extremities     LABS:                            14.9   6.72  )-----------( 157      ( 26 Dec 2024 04:50 )             43.1     12-26    142  |  104  |  18.6  ----------------------------<  93  3.9   |  25.0  |  0.85    Ca    8.4      26 Dec 2024 04:50  Mg     1.9     12-26    TPro  6.4[L]  /  Alb  3.9  /  TBili  0.6  /  DBili  x   /  AST  25  /  ALT  22  /  AlkPhos  81  12-26    PT/INR - ( 25 Dec 2024 23:45 )   PT: 10.9 sec;   INR: 0.94 ratio         PTT - ( 25 Dec 2024 23:45 )  PTT:30.1 sec  Urinalysis Basic - ( 26 Dec 2024 04:50 )    Color: x / Appearance: x / SG: x / pH: x  Gluc: 93 mg/dL / Ketone: x  / Bili: x / Urobili: x   Blood: x / Protein: x / Nitrite: x   Leuk Esterase: x / RBC: x / WBC x   Sq Epi: x / Non Sq Epi: x / Bacteria: x      12-26-24 @ 04:50  CHolesterol: 121,  HDL: 57,  LDL: 53, Triglycerides: 57       Thyroid Stimulating Hormone, Serum: 1.84 uIU/mL (12-25-24 @ 23:45)        ECG:   Prior ECG: Yes [  ] No [  ]    CARDIAC TESTING   ECHO:    STRESS:    Cardiac Interventions:    CATH:     ELECTROPHYSIOLOGY:                                                     [facility]                                              INTERVENTIONAL CARDIOLOGY AT [place] [address]                                             Telephone: [phone]. Fax:[phone]                                                       INTERVENTIONAL CARDIOLOGY CONSULTATION NOTE                                                                                             History obtained by: Patient and medical record  Community Cardiologist:   Reason for Consultation: Evaluation for cardiac catheterization  Available pt records reviewed: Yes [ x ] No [  ]    Chief complaint:    Patient is a 54y old  Male who presents with a chief complaint of LHC (26 Dec 2024 10:26)      HPI:  54 y.o. male with PMHx CAD s/p PCI (5 stents in ; RCA, mLAD, dLCx), gout, HLD, h/o bradycardia pw Chest pain. Reports 2 months onset of decreased exercise tolerance. Underwent stress test with outpatient cardiologist, which was abnormal and was told to get LHC at [place] on Friday. Noted persistent chest pain with radiation to the left arm last night and came for further eval. ROS negative for fever, chill, SOB, n/v/d/c nor urinary concerns.     ED vitals notable for bradycardia, admitted for C .  (26 Dec 2024 08:36)      Anginal Class:        Angina (Class): 3       Ischemic Symptoms: chest pain     Heart Failure:        Systolic/Diastolic/Combined: Mild HFrEF 45-50%       NYHA Class (within 2 weeks):       PAST MEDICAL HISTORY  No pertinent past medical history  Other tear of medial meniscus, current injury, right knee, initial encounter  Other tear of lateral meniscus as current injury  Gout  CAD (coronary artery disease)  HTN (hypertension)  Hyperlipidemia        Associated Risk Factors:        Frailty Assessment: (none/mild/mod/severe):       Cerebrovascular Disease: N/A       Chronic Lung Disease: N/A       Peripheral Arterial Disease: N/A       Chronic Kidney Disease (if yes, what is GFR): N/A       Uncontrolled Diabetes (if yes, what is HgbA1C or FBS): N/A       Poorly Controlled Hypertension (if yes, what is SBP): N/A       Morbid Obesity (if yes, what is BMI): N/A       History of Recent Ventricular Arrhythmia: N/A       Inability to Ambulate Safely: N/A       Need for Therapeutic Anticoagulation: N/A       Antiplatelet or Contrast Allergy: N/A      PAST SURGICAL HISTORY  No significant past surgical history    H/O cardiac catheterization    History of percutaneous coronary intervention        SOCIAL HISTORY:  denies tobacco or ETOH use     FAMILY HISTORY:  No pertinent family history in first degree relatives  Adopted     Family History of Premature Cardiovascular Disease:  Yes [  ] No [  ]    HOME MEDICATIONS:  allopurinol 100 mg oral tablet: 1 tab(s) orally once a day (at bedtime) (25 Dec 2024 23:54)        Antianginal Therapies:        Beta Blockers:         Calcium Channel Blockers:        Long Acting Nitrates:        Ranexa:     ALLERGIES:   No Known Allergies      REVIEW OF SYMPTOMS:   CONSTITUTIONAL: no fever, no chills, no weight loss, no weight gain, no fatigue   CARDIOVASCULAR: chest pain resolved   RESPIRATORY: no Shortness of breath, no cough, no wheezing  : No dysuria, no hematuria   GI: No dark color stool, no nausea, no diarrhea, no constipation, no abdominal pain   NEURO: No headache, no slurred speech   ALL OTHER REVIEW OF SYSTEMS ARE NEGATIVE.    VITAL SIGNS:  T(C): 36.3 (24 @ 10:45), Max: 36.6 (24 @ 07:16)  T(F): 97.4 (24 @ 10:45), Max: 97.8 (24 @ 07:16)  HR: 38 (24 @ 10:45) (29 - 49)  BP: 159/91 (24 @ 10:45) (128/65 - 177/85)  RR: 16 (24 @ 10:45) (16 - 20)  SpO2: 95% (24 @ 10:45) (95% - 99%)    INTAKE AND OUTPUT:       PHYSICAL EXAM:  Constitutional: Comfortable . No acute distress.   HEENT: Atraumatic and normocephalic , neck is supple . no JVD. No carotid bruit.  CNS: A&Ox3. No focal deficits.   Respiratory: CTAB, unlabored   Cardiovascular: RRR normal s1 s2. No murmur. No rubs or gallop.  Gastrointestinal: Soft, non-tender. +Bowel sounds.   Extremities: 2+ Peripheral Pulses, No clubbing, cyanosis, or edema  Psychiatric: Calm . no agitation.   Skin: Warm and dry, no ulcers on extremities     LABS:                            14.9   6.72  )-----------( 157      ( 26 Dec 2024 04:50 )             43.1         142  |  104  |  18.6  ----------------------------<  93  3.9   |  25.0  |  0.85    Ca    8.4      26 Dec 2024 04:50  Mg     1.9         TPro  6.4[L]  /  Alb  3.9  /  TBili  0.6  /  DBili  x   /  AST  25  /  ALT  22  /  AlkPhos  81      PT/INR - ( 25 Dec 2024 23:45 )   PT: 10.9 sec;   INR: 0.94 ratio         PTT - ( 25 Dec 2024 23:45 )  PTT:30.1 sec  Urinalysis Basic - ( 26 Dec 2024 04:50 )    Color: x / Appearance: x / SG: x / pH: x  Gluc: 93 mg/dL / Ketone: x  / Bili: x / Urobili: x   Blood: x / Protein: x / Nitrite: x   Leuk Esterase: x / RBC: x / WBC x   Sq Epi: x / Non Sq Epi: x / Bacteria: x      24 @ 04:50  CHolesterol: 121,  HDL: 57,  LDL: 53, Triglycerides: 57       Thyroid Stimulating Hormone, Serum: 1.84 uIU/mL (24 @ 23:45)        EC Sinus Elio   Prior ECG: Yes [  ] No [  ]    CARDIAC TESTING   ECHO:  < from: TTE W or WO Ultrasound Enhancing Agent (24 @ 09:06) >     CONCLUSIONS:      1. Left ventricular systolic function is mildly decreased with an ejection fraction of 52 % by Ricks's method of disks with an ejection fraction visually estimated at 45 to 50 %.   2. Left atrium is mildly dilated.   3. The right atrium is dilated.   4. Mild mitral regurgitation.    < end of copied text >    STRESS:    Cardiac Interventions:    CATH:   < from: Cardiac Cath Lab - Adult (20 @ 15:30) >  VENTRICLES: No left ventriculogram was performed. No LV gram was performed;  however, a recent echocardiogram demonstrated an EF of 55 %.  CORONARY VESSELS: The coronary circulation is right dominant.  LM:   --  LM: Normal.  LAD:   --  Proximal LAD: There was a 30 % stenosis. iFR 0.96. In a second  lesion, there was a 0 % stenosis in-stent.  --  Mid LAD: There was a 0 % stenosis in-stent.  CX:   --  Mid circumflex: There was a 40 % stenosis.  --  Distal circumflex: There was a 0 % stenosis in-stent.  RCA:   --  Mid RCA: There was a 40 % stenosis. In a second lesion, there  was a 0 % stenosis in-stent.  --  Distal RCA: There was a 0 % stenosis in-stent.  COMPLICATIONS: There were no complications.  DIAGNOSTIC IMPRESSIONS: Patent stents.  Proximal to LAD stent, ? of spasm with catheter engagement. iFR negative.  Normal LV function by TTE.    < end of copied text >        < from: Cardiac Cath Lab - Adult (20 @ 17:32) >  VENTRICLES: No left ventriculogram was performed. EF 50% at time of  intervention for STEMI.  CORONARY VESSELS: The coronary circulation is right dominant.  LM:   --  LM: The LM is cloacal. Angiography showed no evidence of disease.  LAD:   --  Proximal LAD: There was a tubular 70 % stenosis. The lesion was  irregularly contoured, eccentric, and mildly calcified. There was BRIANNA  grade 3 flow through the vessel (brisk flow). An intervention was  performed.  --  Mid LAD: There was a discrete 80 % stenosis. The lesion was irregularly  contoured, eccentric, and moderately calcified. There was BRIANNA grade 3  flow through the vessel (brisk flow). This vicki likely culprit for the  patient's clinical presentation. An intervention was performed.  --  D1: The vessel was small sized. There was a discrete 40 % stenosis. The  lesion was smoothly contoured and eccentric.  CX:   --  Mid circumflex: There was a discrete 30 % stenosis. The lesion  was irregularly contoured and eccentric.  --  Distal circumflex: There was a tubular 100 % stenosis at the origin of  OM2. There was BRIANNA grade 0 flow through the vessel (no flow). This is a  likely culprit for the patient's clinical presentation. An intervention  was performed.  RCA:   --  Mid RCA: There was a discrete 40 % stenosis. The lesion was  smoothly contoured, irregularly contoured, and eccentric. In a second  lesion, there was a 0 % stenosis at the site of a prior stent.  --  Distal RCA: There was a 0 % stenosis at the site of a prior stent.  COMPLICATIONS: No complications occurred during the cath lab visit.  DIAGNOSTIC IMPRESSIONS: - Patent RCA stents  - Successful PCI of the distal LCx with a3.0mm Resolute TU (post-dilated   with a 4.0mm balloon proximally)  - Successful PCI of the proximal LAD with a 4.0mm Resolute TU  - Successful PCI of the mid LAD with a 3.5mm Resoluted TU (post-dilated to   4.0mm)  - Complete stent expansion and apposition confirmed by IVUS imaging  DIAGNOSTIC RECOMMENDATIONS: - ASA 81mg daily indefinitely; Brilinta 90mg  BID for 1 year  - Aggressive medical management and risk factor modification  - Lipitor 80mg qHS  - ACEi, BB if HR will permit (resting sinus bradycardia)  - Outpatient follow up with Dr. Flores at the [facility] in 1   week  INTERVENTIONAL IMPRESSIONS: - Patent RCA stents  - Successful PCI of the distal LCx with a 3.0mm Resolute TU (post-dilated   with a 4.0mm balloon proximally)  - Successful PCI of the proximal LAD with a 4.0mm Resolute TU  - Successful PCI of the mid LAD with a 3.5mm Resoluted TU (post-dilated to   4.0mm)    < end of copied text >    ELECTROPHYSIOLOGY: MCOT in progress

## 2024-12-26 NOTE — CONSULT NOTE ADULT - NS ATTEND AMEND GEN_ALL_CORE FT
I have seen and examined the patient and agree with the assessment and plan as documented. Here for [facility] for TEE.

## 2024-12-26 NOTE — CONSULT NOTE ADULT - PROBLEM SELECTOR RECOMMENDATION 9
Monitor on telemetry for acute arrhythmias or recurrent angina symptoms   - Troponin 14-->14; pBNP 163;    - ECG: Initial ECG blake with bigeminy; repeat ECG blake @ 41bpm bigem resolved   - Repeat ECG if any clinical indication or change on tele  - Pending TTE for evaluation of cardiac function, WMA, and any valvular abnormalities   - Per pt, outpt NST abnormal, scheduled for LHC at [facility]    - Maintain NPO for possible LHC inpatient   - Need to obtain outpatient NST records in AM  - ASA 81mg taken at home, 234mg administered per ED provider  - Continue ASA 81 mg PO daily  - Continue Atorvastatin 80 mg PO HS   - Lipid Panel and A1C

## 2024-12-26 NOTE — DISCHARGE NOTE NURSING/CASE MANAGEMENT/SOCIAL WORK - FINANCIAL ASSISTANCE
[facility] provides services at a reduced cost to those who are determined to be eligible through [facility]’s financial assistance program. Information regarding [facility]’s financial assistance program can be found by going to [email or web address] or by calling [phone].

## 2024-12-26 NOTE — DISCHARGE NOTE PROVIDER - NSDCCPTREATMENT_GEN_ALL_CORE_FT
PRINCIPAL PROCEDURE  Procedure: Left heart cardiac cath  Findings and Treatment: -You had a cardiac catheterization with Dr. Grace today on 12/26/24. Dr. Grace accessed your right radial artery during the procedure. That is the artery in your right wrist.   -Please continue to monitor your right wrist when you go home. If you develop any bleeding, lay down where you are and apply direct firm pressure until the bleeding stops. If the bleeding is excessive, please call 911.   -If heavy bleeding or large lumps form, hold pressure at the spot and come to the Emergency Room.  -No submerging the arm in water for 48 hours. This includes hot tubs, swimming pools and jacuzzis.  You may start showering tomorrow on 12/27. Prior to showering, remove dressing from right wrist. Shower with warm water and soap. Pat dry with towel. You may place a bandaid over the site. change the bandaid every day.   -Restricted use with no heavy lifting of affected arm for 5 days. No heavy lifting greater than 5 lbs.  -You may walk indoors/ outdoors as tolerated. No strenuous exercise, gym, sports or heavy lifting x5 days.  -Please return to nearest ED if you develop any fever, chills, drainage from the incision site, or any change of temperature, color, sensation of the affected extremity.  -Call your doctor for any bleeding, swelling, loss of sensation in the hand or fingers, or fingers turning blue.  -Please return to nearest ED if you develop any chest pain, chest pressure, shortness of breath, jaw pain, pain radiating down the arm, palpitations, dizziness, palpitations, abdominal complaints including abdominal pain, nausea and vomiting.   -Please follow up with your cardiologist in 1-2 weeks

## 2024-12-26 NOTE — ED PROVIDER NOTE - OBJECTIVE STATEMENT
54y M w/ hx CAD s/p stent x 5 (2020), HTN, HLD; presents for chest pain. Pt reports onset earlier tonight of left-sided chest discomfort. No SOB, dizziness, nausea, sweating. Pt noted to be bradycardic in the ED -- pt says that his heart rate is typically in the 40s-50s. Took baby ASA this morning. 54y M w/ hx CAD s/p stent x 5 (2020), HTN, HLD; presents for chest pain. Pt reports onset earlier tonight of left-sided chest discomfort. No SOB, dizziness, nausea, sweating. Pt noted to be bradycardic in the ED -- pt says that his heart rate is typically in the 40s-50s. Took baby ASA this morning. Pt reports that he recently had abnormal stress test and is scheduled for [facility] 12/27 with his cardiologist Dr. Dugan in [place].

## 2024-12-26 NOTE — DISCHARGE NOTE PROVIDER - CARE PROVIDERS DIRECT ADDRESSES
,DirectAddress_Unknown ,DirectAddress_Unknown,[email or web address]

## 2024-12-27 ENCOUNTER — TRANSCRIPTION ENCOUNTER (OUTPATIENT)
Age: 54
End: 2024-12-27

## 2025-01-02 ENCOUNTER — RX RENEWAL (OUTPATIENT)
Age: 55
End: 2025-01-02

## 2025-01-16 ENCOUNTER — RESULT REVIEW (OUTPATIENT)
Age: 55
End: 2025-01-16

## 2025-01-16 ENCOUNTER — APPOINTMENT (OUTPATIENT)
Dept: MRI IMAGING | Facility: CLINIC | Age: 55
End: 2025-01-16

## 2025-01-16 PROCEDURE — 75565 CARD MRI VELOC FLOW MAPPING: CPT

## 2025-01-16 PROCEDURE — 75561 CARDIAC MRI FOR MORPH W/DYE: CPT

## 2025-01-16 PROCEDURE — A9585: CPT

## 2025-01-20 ENCOUNTER — NON-APPOINTMENT (OUTPATIENT)
Age: 55
End: 2025-01-20

## 2025-01-21 ENCOUNTER — NON-APPOINTMENT (OUTPATIENT)
Age: 55
End: 2025-01-21

## 2025-01-21 ENCOUNTER — APPOINTMENT (OUTPATIENT)
Dept: ELECTROPHYSIOLOGY | Facility: CLINIC | Age: 55
End: 2025-01-21
Payer: COMMERCIAL

## 2025-01-21 VITALS — OXYGEN SATURATION: 97 % | HEIGHT: 74 IN | HEART RATE: 41 BPM | WEIGHT: 238 LBS | BODY MASS INDEX: 30.54 KG/M2

## 2025-01-21 VITALS — HEART RATE: 47 BPM | SYSTOLIC BLOOD PRESSURE: 148 MMHG | DIASTOLIC BLOOD PRESSURE: 75 MMHG

## 2025-01-21 DIAGNOSIS — I49.3 VENTRICULAR PREMATURE DEPOLARIZATION: ICD-10-CM

## 2025-01-21 DIAGNOSIS — R00.1 BRADYCARDIA, UNSPECIFIED: ICD-10-CM

## 2025-01-21 PROCEDURE — 93000 ELECTROCARDIOGRAM COMPLETE: CPT

## 2025-01-21 PROCEDURE — 99215 OFFICE O/P EST HI 40 MIN: CPT

## 2025-01-21 RX ORDER — LOSARTAN POTASSIUM 25 MG/1
25 TABLET, FILM COATED ORAL
Qty: 90 | Refills: 0 | Status: ACTIVE | COMMUNITY
Start: 2025-01-21

## 2025-01-23 NOTE — ED ADULT NURSE NOTE - NS TRANSFER PATIENT BELONGINGS
Clothing/Other belongings Treatment Goal Explanation (Does Not Render In The Note): Stable for the purposes of categorizing medical decision making is defined by the specific treatment goals for an individual patient. A patient that is not at their treatment goal is not stable, even if the condition has not changed and there is no short- term threat to life or function.

## 2025-02-14 ENCOUNTER — APPOINTMENT (OUTPATIENT)
Dept: HEART FAILURE | Facility: CLINIC | Age: 55
End: 2025-02-14

## 2025-02-14 ENCOUNTER — APPOINTMENT (OUTPATIENT)
Dept: ELECTROPHYSIOLOGY | Facility: CLINIC | Age: 55
End: 2025-02-14

## 2025-02-14 VITALS
OXYGEN SATURATION: 99 % | WEIGHT: 237 LBS | BODY MASS INDEX: 30.42 KG/M2 | HEART RATE: 47 BPM | HEIGHT: 74 IN | SYSTOLIC BLOOD PRESSURE: 129 MMHG | DIASTOLIC BLOOD PRESSURE: 78 MMHG

## 2025-02-14 VITALS
HEART RATE: 49 BPM | OXYGEN SATURATION: 95 % | BODY MASS INDEX: 30.29 KG/M2 | TEMPERATURE: 97.5 F | HEIGHT: 74 IN | DIASTOLIC BLOOD PRESSURE: 83 MMHG | WEIGHT: 236 LBS | SYSTOLIC BLOOD PRESSURE: 143 MMHG

## 2025-02-14 DIAGNOSIS — R00.1 BRADYCARDIA, UNSPECIFIED: ICD-10-CM

## 2025-02-14 DIAGNOSIS — I50.22 CHRONIC SYSTOLIC (CONGESTIVE) HEART FAILURE: ICD-10-CM

## 2025-02-14 DIAGNOSIS — I49.3 VENTRICULAR PREMATURE DEPOLARIZATION: ICD-10-CM

## 2025-02-14 PROCEDURE — 99244 OFF/OP CNSLTJ NEW/EST MOD 40: CPT

## 2025-02-14 PROCEDURE — G2211 COMPLEX E/M VISIT ADD ON: CPT

## 2025-02-14 PROCEDURE — 99204 OFFICE O/P NEW MOD 45 MIN: CPT

## 2025-02-14 RX ORDER — SACUBITRIL AND VALSARTAN 24; 26 MG/1; MG/1
24-26 TABLET, FILM COATED ORAL
Qty: 60 | Refills: 2 | Status: ACTIVE | COMMUNITY
Start: 2025-02-14 | End: 1900-01-01

## 2025-02-17 NOTE — ED ADULT NURSE NOTE - CHPI ED NUR RELIEVING FX
Genetics  [facility]     Contact our nurse care coordinator Rosi AGUDELON, RN, PHN at [phone] or send a [employer] message for any non-urgent general or medical questions.     If you had genetic testing and have further questions, please contact the genetic counselor:        To schedule appointments:  Pediatric Call Center for [facility]: [phone]  Neuropsychology [phone]   Radiology/ Imaging/Echocardiogram: [phone]   Services:   [phone]     none

## 2025-02-25 ENCOUNTER — APPOINTMENT (OUTPATIENT)
Dept: ELECTROPHYSIOLOGY | Facility: CLINIC | Age: 55
End: 2025-02-25

## 2025-03-03 PROBLEM — I25.5 ISCHEMIC CARDIOMYOPATHY: Status: ACTIVE | Noted: 2025-03-03

## 2025-03-10 ENCOUNTER — APPOINTMENT (OUTPATIENT)
Dept: RHEUMATOLOGY | Facility: CLINIC | Age: 55
End: 2025-03-10
Payer: COMMERCIAL

## 2025-03-10 VITALS
SYSTOLIC BLOOD PRESSURE: 111 MMHG | DIASTOLIC BLOOD PRESSURE: 57 MMHG | WEIGHT: 234 LBS | HEART RATE: 46 BPM | BODY MASS INDEX: 30.03 KG/M2 | HEIGHT: 74 IN | OXYGEN SATURATION: 95 %

## 2025-03-10 DIAGNOSIS — M10.9 GOUT, UNSPECIFIED: ICD-10-CM

## 2025-03-10 DIAGNOSIS — M17.11 UNILATERAL PRIMARY OSTEOARTHRITIS, RIGHT KNEE: ICD-10-CM

## 2025-03-10 DIAGNOSIS — S83.231A COMPLEX TEAR OF MEDIAL MENISCUS, CURRENT INJURY, RIGHT KNEE, INITIAL ENCOUNTER: ICD-10-CM

## 2025-03-10 DIAGNOSIS — M48.061 SPINAL STENOSIS, LUMBAR REGION WITHOUT NEUROGENIC CLAUDICATION: ICD-10-CM

## 2025-03-10 DIAGNOSIS — S83.281D OTHER TEAR OF LATERAL MENISCUS, CURRENT INJURY, RIGHT KNEE, SUBSEQUENT ENCOUNTER: ICD-10-CM

## 2025-03-10 PROCEDURE — G2211 COMPLEX E/M VISIT ADD ON: CPT

## 2025-03-10 PROCEDURE — 99214 OFFICE O/P EST MOD 30 MIN: CPT

## 2025-03-11 LAB
ALBUMIN SERPL ELPH-MCNC: 4 G/DL
ALP BLD-CCNC: 82 U/L
ALT SERPL-CCNC: 18 U/L
ANION GAP SERPL CALC-SCNC: 11 MMOL/L
AST SERPL-CCNC: 23 U/L
BASOPHILS # BLD AUTO: 0.05 K/UL
BASOPHILS NFR BLD AUTO: 0.6 %
BILIRUB SERPL-MCNC: 0.8 MG/DL
BUN SERPL-MCNC: 15 MG/DL
CALCIUM SERPL-MCNC: 9 MG/DL
CHLORIDE SERPL-SCNC: 105 MMOL/L
CO2 SERPL-SCNC: 22 MMOL/L
CREAT SERPL-MCNC: 0.91 MG/DL
EGFRCR SERPLBLD CKD-EPI 2021: 101 ML/MIN/1.73M2
EOSINOPHIL # BLD AUTO: 0.17 K/UL
EOSINOPHIL NFR BLD AUTO: 2.1 %
GLUCOSE SERPL-MCNC: 116 MG/DL
HCT VFR BLD CALC: 45.4 %
HGB BLD-MCNC: 15.5 G/DL
IMM GRANULOCYTES NFR BLD AUTO: 0.1 %
LYMPHOCYTES # BLD AUTO: 1.7 K/UL
LYMPHOCYTES NFR BLD AUTO: 20.8 %
MAN DIFF?: NORMAL
MCHC RBC-ENTMCNC: 31.7 PG
MCHC RBC-ENTMCNC: 34.1 G/DL
MCV RBC AUTO: 92.8 FL
MONOCYTES # BLD AUTO: 0.56 K/UL
MONOCYTES NFR BLD AUTO: 6.9 %
NEUTROPHILS # BLD AUTO: 5.67 K/UL
NEUTROPHILS NFR BLD AUTO: 69.5 %
PLATELET # BLD AUTO: 167 K/UL
POTASSIUM SERPL-SCNC: 4 MMOL/L
PROT SERPL-MCNC: 6.6 G/DL
RBC # BLD: 4.89 M/UL
RBC # FLD: 13.2 %
SODIUM SERPL-SCNC: 138 MMOL/L
URATE SERPL-MCNC: 6.8 MG/DL
WBC # FLD AUTO: 8.16 K/UL

## 2025-03-28 ENCOUNTER — APPOINTMENT (OUTPATIENT)
Dept: HEART FAILURE | Facility: CLINIC | Age: 55
End: 2025-03-28

## 2025-03-28 PROCEDURE — 94621 CARDIOPULM EXERCISE TESTING: CPT

## 2025-03-28 PROCEDURE — 93018 CV STRESS TEST I&R ONLY: CPT | Mod: 59

## 2025-03-28 PROCEDURE — 94375 RESPIRATORY FLOW VOLUME LOOP: CPT

## 2025-03-28 PROCEDURE — 94200 LUNG FUNCTION TEST (MBC/MVV): CPT | Mod: 59

## 2025-03-28 PROCEDURE — 93000 ELECTROCARDIOGRAM COMPLETE: CPT | Mod: 59

## 2025-03-31 ENCOUNTER — RX RENEWAL (OUTPATIENT)
Age: 55
End: 2025-03-31

## 2025-04-03 ENCOUNTER — APPOINTMENT (OUTPATIENT)
Dept: INTERNAL MEDICINE | Facility: CLINIC | Age: 55
End: 2025-04-03

## 2025-04-11 ENCOUNTER — APPOINTMENT (OUTPATIENT)
Dept: HEART FAILURE | Facility: CLINIC | Age: 55
End: 2025-04-11
Payer: COMMERCIAL

## 2025-04-11 VITALS
HEART RATE: 40 BPM | TEMPERATURE: 97.5 F | WEIGHT: 235 LBS | OXYGEN SATURATION: 98 % | DIASTOLIC BLOOD PRESSURE: 71 MMHG | SYSTOLIC BLOOD PRESSURE: 129 MMHG | HEIGHT: 74 IN | BODY MASS INDEX: 30.16 KG/M2

## 2025-04-11 DIAGNOSIS — I10 ESSENTIAL (PRIMARY) HYPERTENSION: ICD-10-CM

## 2025-04-11 DIAGNOSIS — I25.5 ISCHEMIC CARDIOMYOPATHY: ICD-10-CM

## 2025-04-11 DIAGNOSIS — I50.22 CHRONIC SYSTOLIC (CONGESTIVE) HEART FAILURE: ICD-10-CM

## 2025-04-11 DIAGNOSIS — I21.3 ST ELEVATION (STEMI) MYOCARDIAL INFARCTION OF UNSPECIFIED SITE: ICD-10-CM

## 2025-04-11 PROCEDURE — 99214 OFFICE O/P EST MOD 30 MIN: CPT

## 2025-04-11 PROCEDURE — G2211 COMPLEX E/M VISIT ADD ON: CPT

## 2025-04-29 ENCOUNTER — OUTPATIENT (OUTPATIENT)
Dept: OUTPATIENT SERVICES | Facility: HOSPITAL | Age: 55
LOS: 1 days | End: 2025-04-29
Payer: COMMERCIAL

## 2025-04-29 VITALS
DIASTOLIC BLOOD PRESSURE: 67 MMHG | SYSTOLIC BLOOD PRESSURE: 116 MMHG | RESPIRATION RATE: 18 BRPM | HEART RATE: 41 BPM | WEIGHT: 238.1 LBS | TEMPERATURE: 97 F | HEIGHT: 73 IN | OXYGEN SATURATION: 97 %

## 2025-04-29 DIAGNOSIS — I47.20 VENTRICULAR TACHYCARDIA, UNSPECIFIED: ICD-10-CM

## 2025-04-29 DIAGNOSIS — Z98.890 OTHER SPECIFIED POSTPROCEDURAL STATES: Chronic | ICD-10-CM

## 2025-04-29 DIAGNOSIS — Z98.61 CORONARY ANGIOPLASTY STATUS: Chronic | ICD-10-CM

## 2025-04-29 DIAGNOSIS — Z01.818 ENCOUNTER FOR OTHER PREPROCEDURAL EXAMINATION: ICD-10-CM

## 2025-04-29 PROBLEM — I10 ESSENTIAL (PRIMARY) HYPERTENSION: Chronic | Status: INACTIVE | Noted: 2020-09-03 | Resolved: 2025-04-29

## 2025-04-29 PROBLEM — I10 ESSENTIAL (PRIMARY) HYPERTENSION: Chronic | Status: INACTIVE | Noted: 2019-05-16 | Resolved: 2025-04-29

## 2025-04-29 PROBLEM — S83.241A OTHER TEAR OF MEDIAL MENISCUS, CURRENT INJURY, RIGHT KNEE, INITIAL ENCOUNTER: Chronic | Status: INACTIVE | Noted: 2019-05-16 | Resolved: 2025-04-29

## 2025-04-29 PROBLEM — I25.10 ATHEROSCLEROTIC HEART DISEASE OF NATIVE CORONARY ARTERY WITHOUT ANGINA PECTORIS: Chronic | Status: INACTIVE | Noted: 2020-09-03 | Resolved: 2025-04-29

## 2025-04-29 LAB
ANION GAP SERPL CALC-SCNC: 14 MMOL/L — SIGNIFICANT CHANGE UP (ref 5–17)
BLD GP AB SCN SERPL QL: NEGATIVE — SIGNIFICANT CHANGE UP
BUN SERPL-MCNC: 18 MG/DL — SIGNIFICANT CHANGE UP (ref 7–23)
CALCIUM SERPL-MCNC: 9.1 MG/DL — SIGNIFICANT CHANGE UP (ref 8.4–10.5)
CHLORIDE SERPL-SCNC: 105 MMOL/L — SIGNIFICANT CHANGE UP (ref 96–108)
CO2 SERPL-SCNC: 23 MMOL/L — SIGNIFICANT CHANGE UP (ref 22–31)
CREAT SERPL-MCNC: 0.84 MG/DL — SIGNIFICANT CHANGE UP (ref 0.5–1.3)
EGFR: 104 ML/MIN/1.73M2 — SIGNIFICANT CHANGE UP
EGFR: 104 ML/MIN/1.73M2 — SIGNIFICANT CHANGE UP
GLUCOSE SERPL-MCNC: 82 MG/DL — SIGNIFICANT CHANGE UP (ref 70–99)
HCT VFR BLD CALC: 45.8 % — SIGNIFICANT CHANGE UP (ref 39–50)
HGB BLD-MCNC: 15.9 G/DL — SIGNIFICANT CHANGE UP (ref 13–17)
MCHC RBC-ENTMCNC: 31.9 PG — SIGNIFICANT CHANGE UP (ref 27–34)
MCHC RBC-ENTMCNC: 34.7 G/DL — SIGNIFICANT CHANGE UP (ref 32–36)
MCV RBC AUTO: 91.8 FL — SIGNIFICANT CHANGE UP (ref 80–100)
NRBC BLD AUTO-RTO: 0 /100 WBCS — SIGNIFICANT CHANGE UP (ref 0–0)
PLATELET # BLD AUTO: 163 K/UL — SIGNIFICANT CHANGE UP (ref 150–400)
POTASSIUM SERPL-MCNC: 4 MMOL/L — SIGNIFICANT CHANGE UP (ref 3.5–5.3)
POTASSIUM SERPL-SCNC: 4 MMOL/L — SIGNIFICANT CHANGE UP (ref 3.5–5.3)
RBC # BLD: 4.99 M/UL — SIGNIFICANT CHANGE UP (ref 4.2–5.8)
RBC # FLD: 12.3 % — SIGNIFICANT CHANGE UP (ref 10.3–14.5)
RH IG SCN BLD-IMP: POSITIVE — SIGNIFICANT CHANGE UP
SODIUM SERPL-SCNC: 142 MMOL/L — SIGNIFICANT CHANGE UP (ref 135–145)
WBC # BLD: 5.42 K/UL — SIGNIFICANT CHANGE UP (ref 3.8–10.5)
WBC # FLD AUTO: 5.42 K/UL — SIGNIFICANT CHANGE UP (ref 3.8–10.5)

## 2025-04-29 PROCEDURE — 86850 RBC ANTIBODY SCREEN: CPT

## 2025-04-29 PROCEDURE — G0463: CPT

## 2025-04-29 PROCEDURE — 80048 BASIC METABOLIC PNL TOTAL CA: CPT

## 2025-04-29 PROCEDURE — 86901 BLOOD TYPING SEROLOGIC RH(D): CPT

## 2025-04-29 PROCEDURE — 85027 COMPLETE CBC AUTOMATED: CPT

## 2025-04-29 PROCEDURE — 86900 BLOOD TYPING SEROLOGIC ABO: CPT

## 2025-04-29 NOTE — H&P PST ADULT - PROBLEM SELECTOR PLAN 1
VT Ablation on 5/12/25.  Pre-op instructions provided and questions answered. Pt verbalized understanding. VT Ablation on 5/12/25.  Pre-op instructions provided and questions answered. Pt verbalized understanding.  Pt advised to continue aspirin 81 mg.

## 2025-04-29 NOTE — H&P PST ADULT - NSICDXPASTSURGICALHX_GEN_ALL_CORE_FT
PAST SURGICAL HISTORY:  H/O cardiac catheterization x 5 stents    History of percutaneous coronary intervention

## 2025-04-29 NOTE — H&P PST ADULT - CARDIOVASCULAR
details… normal/regular rate and rhythm/S1 S2 present/no gallops/no rub/no murmur S1 S2 present/no gallops/no rub/no murmur/bradycardia

## 2025-04-29 NOTE — H&P PST ADULT - NSICDXPASTMEDICALHX_GEN_ALL_CORE_FT
PAST MEDICAL HISTORY:  CAD (coronary artery disease)     Gout     HTN (hypertension)     Hyperlipidemia     Hypertension Patient with a history of Hypertension, patient reports has not take BP medications in the past 2 years    Other tear of lateral meniscus as current injury     VT (ventricular tachycardia)      PAST MEDICAL HISTORY:  CAD (coronary artery disease)     Gout     HTN (hypertension)     Hyperlipidemia     Other tear of lateral meniscus as current injury     VT (ventricular tachycardia)      PAST MEDICAL HISTORY:  CAD (coronary artery disease)     Gout     HTN (hypertension)     Hyperlipidemia     Other tear of lateral meniscus as current injury     Sinus bradycardia     VT (ventricular tachycardia)

## 2025-04-29 NOTE — H&P PST ADULT - ASSESSMENT
DASI score: 9.89 mets   DASI activity: triathlons, runner, biker   Loose teeth or denture: no    MP 3

## 2025-04-29 NOTE — H&P PST ADULT - HISTORY OF PRESENT ILLNESS
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 He was evaluated by Dr. Ashraf and is now recommended for VT Ablation on 5/12/25. He denies CP, SOB, dizziness, lightheadedness, syncope, fever or chills.  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 He was evaluated by Dr. Ashraf and is now recommended for VT Ablation on 5/12/25. He denies CP, SOB, lightheadedness, syncope, fever or chills.     Of note, patient was hospitalized at [facility] on 12/26/24 with c/o increasing palps and dizziness. He underwent LHC which revealed "patent stents RCA and LAD, mid circ 40% unchanged," full report in [place]. He was recommended f/u with EP and Cards.

## 2025-05-07 ENCOUNTER — NON-APPOINTMENT (OUTPATIENT)
Age: 55
End: 2025-05-07

## 2025-05-07 NOTE — PATIENT PROFILE ADULT. - PATIENT REPRESENTATIVE: ( YOU CAN CHOOSE ANY PERSON THAT CAN ASSIST YOU WITH YOUR HEALTH CARE PREFERENCES, DOES NOT HAVE TO BE A SPOUSE, IMMEDIATE FAMILY OR SIGNIFICANT OTHER/PARTNER)
LISSET Chaparro. to Me (Selected Message)      5/7/25  3:09 PM  Ok to order repeat echocardiogram, she had on July 2024 to monitor for aortic aneurysm and measure aortic root.     
Yes

## 2025-05-12 ENCOUNTER — OUTPATIENT (OUTPATIENT)
Dept: OUTPATIENT SERVICES | Facility: HOSPITAL | Age: 55
LOS: 1 days | End: 2025-05-12
Payer: COMMERCIAL

## 2025-05-12 ENCOUNTER — TRANSCRIPTION ENCOUNTER (OUTPATIENT)
Age: 55
End: 2025-05-12

## 2025-05-12 VITALS
DIASTOLIC BLOOD PRESSURE: 59 MMHG | RESPIRATION RATE: 16 BRPM | SYSTOLIC BLOOD PRESSURE: 127 MMHG | WEIGHT: 237 LBS | TEMPERATURE: 98 F | OXYGEN SATURATION: 99 % | HEIGHT: 73 IN | HEART RATE: 36 BPM

## 2025-05-12 VITALS
SYSTOLIC BLOOD PRESSURE: 121 MMHG | TEMPERATURE: 99 F | HEART RATE: 56 BPM | DIASTOLIC BLOOD PRESSURE: 58 MMHG | OXYGEN SATURATION: 96 % | RESPIRATION RATE: 17 BRPM

## 2025-05-12 DIAGNOSIS — Z98.890 OTHER SPECIFIED POSTPROCEDURAL STATES: Chronic | ICD-10-CM

## 2025-05-12 DIAGNOSIS — Z98.61 CORONARY ANGIOPLASTY STATUS: Chronic | ICD-10-CM

## 2025-05-12 DIAGNOSIS — I47.20 VENTRICULAR TACHYCARDIA, UNSPECIFIED: ICD-10-CM

## 2025-05-12 LAB
BLD GP AB SCN SERPL QL: NEGATIVE — SIGNIFICANT CHANGE UP
RH IG SCN BLD-IMP: POSITIVE — SIGNIFICANT CHANGE UP

## 2025-05-12 PROCEDURE — 93623 PRGRMD STIMJ&PACG IV RX NFS: CPT

## 2025-05-12 PROCEDURE — 99152 MOD SED SAME PHYS/QHP 5/>YRS: CPT

## 2025-05-12 PROCEDURE — 93654 COMPRE EP EVAL TX VT: CPT

## 2025-05-12 PROCEDURE — 93010 ELECTROCARDIOGRAM REPORT: CPT

## 2025-05-12 PROCEDURE — 86901 BLOOD TYPING SEROLOGIC RH(D): CPT

## 2025-05-12 PROCEDURE — C1732: CPT

## 2025-05-12 PROCEDURE — 93010 ELECTROCARDIOGRAM REPORT: CPT | Mod: 77

## 2025-05-12 PROCEDURE — C1766: CPT

## 2025-05-12 PROCEDURE — 86850 RBC ANTIBODY SCREEN: CPT

## 2025-05-12 PROCEDURE — 93662 INTRACARDIAC ECG (ICE): CPT

## 2025-05-12 PROCEDURE — 93623 PRGRMD STIMJ&PACG IV RX NFS: CPT | Mod: 26

## 2025-05-12 PROCEDURE — C1889: CPT

## 2025-05-12 PROCEDURE — 86900 BLOOD TYPING SEROLOGIC ABO: CPT

## 2025-05-12 PROCEDURE — C1894: CPT

## 2025-05-12 PROCEDURE — 93662 INTRACARDIAC ECG (ICE): CPT | Mod: 26

## 2025-05-12 PROCEDURE — 93005 ELECTROCARDIOGRAM TRACING: CPT

## 2025-05-12 PROCEDURE — C1759: CPT

## 2025-05-12 RX ORDER — SACUBITRIL AND VALSARTAN 6; 6 MG/1; MG/1
1 PELLET ORAL
Refills: 0 | DISCHARGE

## 2025-05-12 RX ORDER — BACITRACIN 500 UNIT/G
1 OINTMENT (GRAM) TOPICAL ONCE
Refills: 0 | Status: DISCONTINUED | OUTPATIENT
Start: 2025-05-12 | End: 2025-05-26

## 2025-05-12 RX ADMIN — Medication 1 APPLICATION(S): at 06:33

## 2025-05-12 NOTE — PATIENT PROFILE ADULT - MEDICATIONS/VISITS
Spoke with Dr. Guerra at bedside this morning at 0905 with patient. He stated that the patient looked good and was okay for transfer to floor or rehab. He stated he wanted her to follow up in 1 mo (appointment scheduled) and also have carotid duplex before discharge (completed). (Per Madhav Phelps's note, he also wants her to have a Carotid duplex at next appointment.) Charlie also said that he would call Dr. Cervantes to verify discharging on Eliquis versus just aspirin and plavix. Regardless, she will be discharged on 81mg aspirin daily.     Spoke with Dr. Crenshaw in rounds and discussed plan of care. I informed him of the following: that the patient is neurologically intact except RLE drift most likely related to hip replacement; HR 50s-70s SB/NSR no signs of afib; SBP 80s-100s, held Atenolol this am due to hypotension and bradycardia; SCDS on; getting ASA/plavix; PT/OT re-consulted; Right hip incision C/D/I BEAR; ordered to D/C fluids since eating and drinking.  Okay with Den to transfer to rehab today.     Spoke with Jennifer VILLALOBOS at nurse's station who works with Hospitalist. She stated that they would be discharging patient. I informed her of drop in H/H: 7.4/24.4 down from 9.3/30.9 yesterday afternoon. She stated that she would order [facility] to redraw labs in a few days.     Called Dr. Crenshaw to verify discharge. He stated that he was fine with this and that drop in H/H might have not been real. No signs and symptoms of bleeding. VSS. He also stated that he was fine with Hospitalists completing discharge summary.     [id number] Paged Mer Cox with Cardiology to discuss discharge. She returned my call and stated that they were not the ones actually seeing her and to call Dr. Cervantes.     4022 Paged Dr. Cervantes. I informed him that the patient would be discharged today at 1500 to [facility] facility. I mentioned that Dr. Guerra stated he would call to discuss discharging on Eliquis. He stated  that he had not called and since there were no signs of Afib that she will not be discharged on Eliquis, just ASA and Plavix. He said for her to follow up with Dr. Pearl in 4 weeks and that he can decide then if she needs a holter monitor.     Paged Madhav Phelps PA-C to verify discharge. No call returned. Paged Dominga Cordero PA-C. Call reutrned at 1340 from OR scrub RN, I informed her that the patient was being discharged to [facility] at 1500, that Billy did not want Eliquis, just ASA and Plavix, that she would receive a Bilateral Carotid Duplex before discharge and at her 1 month follow up. She stated that she would inform Dr. Guerra. No new orders.     1415 Report called to [facility] at [phone]. Informed them of current assessment, follow ups, medication orders, H/H, and plan of care. He stated that he had no further questions.     IV removed. Patient bathed and dressed. Discharge education completed with patient. She stated that she had no further questions. At this time she stated that she did not want to follow up with Dr. Pearl or see him any longer. I paged Dr. Cervantes at [phone] and he said to have her follow up with someone in our [facility] office in two weeks and get a holter monitor. Due to outside scheduled transport already having arrived, I was unable to make this appointment. I gave the patient their number and instructions to call to make appointment and she stated that she understood. I also called [facility] and spoke with unit RN and she said that she would make the appointment for her.     Patient discharged at 1505.    no

## 2025-05-12 NOTE — CHART NOTE - NSCHARTNOTEFT_GEN_A_CORE
5/12 s/p PVC ablation via RFV w/ vascade x3 with sutures  right groin suture removed at 1800 as per protocol   manual pressure applied for 10 min  good hemostasis achieved  no bleeding or oozing at site, site soft non tender   patient to follow activity restrictions   Patient tolerated well    Vital Signs Last 24 Hrs  T(C): 36.7 (12 May 2025 13:15), Max: 36.8 (12 May 2025 06:23)  T(F): 98 (12 May 2025 13:15), Max: 98.2 (12 May 2025 06:23)  HR: 53 (12 May 2025 17:00) (36 - 65)  BP: 117/62 (12 May 2025 17:00) (108/58 - 135/62)  BP(mean): 84 (12 May 2025 17:00) (79 - 92)  RR: 16 (12 May 2025 17:00) (16 - 18)  SpO2: 95% (12 May 2025 17:00) (95% - 99%)    Parameters below as of 12 May 2025 18:00  Patient On (Oxygen Delivery Method): room air

## 2025-05-12 NOTE — PRE-ANESTHESIA EVALUATION ADULT - NSANTHPMHFT_GEN_ALL_CORE
Initiate Treatment: metronidazole 0.75 % topical cream \\nQuantity: 45.0 g  Days Supply: 30\\nSig: Apply BID to face
Detail Level: Zone
Render In Strict Bullet Format?: No
Reported decreased exercise tolerance as noted in EMR, no complications from prior anesthetics

## 2025-05-12 NOTE — ASU DISCHARGE PLAN (ADULT/PEDIATRIC) - FINANCIAL ASSISTANCE
[facility] provides services at a reduced cost to those who are determined to be eligible through [facility]’s financial assistance program. Information regarding [facility]’s financial assistance program can be found by going to [email or web address] or by calling [phone].

## 2025-05-12 NOTE — PRE-ANESTHESIA EVALUATION ADULT - NSANTHOSAYNRD_GEN_A_CORE
No. HIEN screening performed.  STOP BANG Legend: 0-2 = LOW Risk; 3-4 = INTERMEDIATE Risk; 5-8 = HIGH Risk
No. HIEN screening performed.  STOP BANG Legend: 0-2 = LOW Risk; 3-4 = INTERMEDIATE Risk; 5-8 = HIGH Risk

## 2025-05-12 NOTE — CHART NOTE - NSCHARTNOTEFT_GEN_A_CORE
H&P and allergies reviewed   Medications reviewed and reconciled H&P and allergies reviewed   Medications reviewed and reconciled        Mr. Horn is a 55 y/o M w/ history of CAD s/p inferior NSTEMI (x5DES in Sept 2020- RCA, mLAD, dLCx), gout, borderline HTN, HLD, sinus bradycardia and newly found cardiomyopathy, potentially mixed in etiology (LEVF 41% by cMRI) .In September 2020 where he presented with CP and found to have NSTEMI and subsequently underwent multiplePCI as above. TTE in 9/2020 showed LVEF 60-65%. He has been regularly exercising/training and participating intriathlons (completed 3 half iron man races). He reports noticing a decline in his activity tolerance since about October 2024. He reports having to run at a slower pace, for example he was doing 8 miles at an 11 min/mile pacewhich was notable slower for him and he's also noted his HR is maintaining at higher rate during his activity than previously. He was evaluated in early December with a repeat TTE show showed new LV systolic dysfunction withEF 45-50% and was found to have ECG changes thus prompting an exercise stress which demonstrated mildly reversible abnormalities and post gated LVEF 39%. Ultimately, he underwent a LHC at [facility] after presenting withCP on 12/25/24 which revealed non-obs CAD and patent stents. He was started on losartan at the end of December. Also evaluated by EP while hospitalized for high PVC burden and was referred to Dr. Ashraf for PVC ablation. He had a holter 1/19-1/22 showing SB, AIVR with 13% PVCs, HR 28-93, avg 51, no pauses. Beta blocker was deferred given bradycardia. Dr. Ashraf notes resting bradycardia with chronotropic competence and frequent PVCs potentially relating to decreased activity tolerance. He had a cMRI 1/21/25 obtain cMRI LVEF 41%,+ LGE c/w ischemic disease. Now presents for ablation.

## 2025-05-12 NOTE — ASU DISCHARGE PLAN (ADULT/PEDIATRIC) - CARE PROVIDER_API CALL
Armin Ashraf.  Cardiac Electrophysiology  [address] [place] [id number]  Phone: [phone]  Fax: [phone]  Scheduled Appointment: 06/24/2025 02:30 PM   193.3

## 2025-05-12 NOTE — ASU DISCHARGE PLAN (ADULT/PEDIATRIC) - ASU DC SPECIAL INSTRUCTIONSFT
WOUND CARE:  The day AFTER your procedure  - Remove the bandage at the site GENTLY, clean with mild soap and water, and pat dry; leave open to air  - You may shower   - DO NOT apply lotions, creams, ointments, powder, perfumes to your incision site  - Check your groin every day. A small amount of bruising or soreness is normal, a bump (smaller than nickel) might be present, which is normal  - DO NOT SOAK your site for 1 week (no baths, no pools, no tubs, etc..)    ACTIVITY:  Your procedure was done through your groin  For the next 5 DAYS:  - Limit climbing stairs, no strenuous activity, pushing , pulling, or straining     DO NOT LIFT anything 10 lbs or heavier   - You may resume sexual activity in 7 days, unless instructed otherwise  - Mild palpitations are normal     Follow heart healthy diet recommended by your doctor.   IF you smoke, STOP SMOKING (may call [phone] for center of tobacco control if you need assistance).    For the next 24 hours:   - Stay at home and rest, do not drive or operate heavy machinery  - Do not drink alcoholic beverages   - Do not make important personal or business decisions     ***CALL YOUR DOCTOR ***  IF you have fever, chills, body aches, or severe pain, swelling, redness, heat, yellow drainage from your incision site  IF bleeding or significant new swelling from your puncture site  IF you experience rapid heartbeat or palpitations that cause: lightheadedness, dizziness, or fainting spells  If you experience difficulty swallowing, or pain with swallowing   IF unable to get in contact with your doctor, you may call the Cardiology Office at [facility] at [phone]

## 2025-05-12 NOTE — PRE-OP CHECKLIST - TEMPERATURE IN CELSIUS (DEGREES C)
"Chief Complaint   Patient presents with     Eye Problem       Initial /75  Pulse 64  Temp 98.4  F (36.9  C) (Tympanic)  Ht 5' 4.8\" (1.646 m)  Wt 193 lb (87.5 kg)  SpO2 100%  BMI 32.32 kg/m2 Estimated body mass index is 32.32 kg/(m^2) as calculated from the following:    Height as of this encounter: 5' 4.8\" (1.646 m).    Weight as of this encounter: 193 lb (87.5 kg).  Medication Reconciliation: complete  " 36.8

## 2025-05-13 ENCOUNTER — NON-APPOINTMENT (OUTPATIENT)
Age: 55
End: 2025-05-13

## 2025-06-24 ENCOUNTER — APPOINTMENT (OUTPATIENT)
Dept: ELECTROPHYSIOLOGY | Facility: CLINIC | Age: 55
End: 2025-06-24
Payer: COMMERCIAL

## 2025-06-24 VITALS
HEIGHT: 74 IN | OXYGEN SATURATION: 95 % | BODY MASS INDEX: 30.8 KG/M2 | SYSTOLIC BLOOD PRESSURE: 124 MMHG | HEART RATE: 46 BPM | WEIGHT: 240 LBS | DIASTOLIC BLOOD PRESSURE: 70 MMHG

## 2025-06-24 PROCEDURE — 93000 ELECTROCARDIOGRAM COMPLETE: CPT

## 2025-06-24 PROCEDURE — 99213 OFFICE O/P EST LOW 20 MIN: CPT

## 2025-07-18 ENCOUNTER — APPOINTMENT (OUTPATIENT)
Dept: CV DIAGNOSITCS | Facility: HOSPITAL | Age: 55
End: 2025-07-18

## 2025-08-25 ENCOUNTER — APPOINTMENT (OUTPATIENT)
Dept: CV DIAGNOSITCS | Facility: HOSPITAL | Age: 55
End: 2025-08-25

## 2025-08-25 ENCOUNTER — APPOINTMENT (OUTPATIENT)
Dept: HEART FAILURE | Facility: CLINIC | Age: 55
End: 2025-08-25

## 2025-09-08 ENCOUNTER — APPOINTMENT (OUTPATIENT)
Dept: RHEUMATOLOGY | Facility: CLINIC | Age: 55
End: 2025-09-08
Payer: COMMERCIAL

## 2025-09-08 VITALS
BODY MASS INDEX: 30.67 KG/M2 | HEART RATE: 40 BPM | RESPIRATION RATE: 16 BRPM | WEIGHT: 239 LBS | DIASTOLIC BLOOD PRESSURE: 60 MMHG | HEIGHT: 74 IN | OXYGEN SATURATION: 98 % | SYSTOLIC BLOOD PRESSURE: 136 MMHG

## 2025-09-08 DIAGNOSIS — S83.231A COMPLEX TEAR OF MEDIAL MENISCUS, CURRENT INJURY, RIGHT KNEE, INITIAL ENCOUNTER: ICD-10-CM

## 2025-09-08 DIAGNOSIS — M17.11 UNILATERAL PRIMARY OSTEOARTHRITIS, RIGHT KNEE: ICD-10-CM

## 2025-09-08 DIAGNOSIS — M10.9 GOUT, UNSPECIFIED: ICD-10-CM

## 2025-09-08 DIAGNOSIS — M48.061 SPINAL STENOSIS, LUMBAR REGION WITHOUT NEUROGENIC CLAUDICATION: ICD-10-CM

## 2025-09-08 DIAGNOSIS — S83.281D OTHER TEAR OF LATERAL MENISCUS, CURRENT INJURY, RIGHT KNEE, SUBSEQUENT ENCOUNTER: ICD-10-CM

## 2025-09-08 PROCEDURE — G2211 COMPLEX E/M VISIT ADD ON: CPT

## 2025-09-08 PROCEDURE — 99214 OFFICE O/P EST MOD 30 MIN: CPT

## 2025-09-09 LAB
ALBUMIN SERPL ELPH-MCNC: 4.7 G/DL
ALP BLD-CCNC: 87 U/L
ALT SERPL-CCNC: 39 U/L
ANION GAP SERPL CALC-SCNC: 11 MMOL/L
AST SERPL-CCNC: 40 U/L
BASOPHILS # BLD AUTO: 0.04 K/UL
BASOPHILS NFR BLD AUTO: 0.8 %
BILIRUB SERPL-MCNC: 0.6 MG/DL
BUN SERPL-MCNC: 18 MG/DL
CALCIUM SERPL-MCNC: 9.4 MG/DL
CHLORIDE SERPL-SCNC: 104 MMOL/L
CO2 SERPL-SCNC: 26 MMOL/L
CREAT SERPL-MCNC: 1.04 MG/DL
EGFRCR SERPLBLD CKD-EPI 2021: 85 ML/MIN/1.73M2
EOSINOPHIL # BLD AUTO: 0.11 K/UL
EOSINOPHIL NFR BLD AUTO: 2.1 %
GLUCOSE SERPL-MCNC: 95 MG/DL
HCT VFR BLD CALC: 49.2 %
HGB BLD-MCNC: 15.6 G/DL
IMM GRANULOCYTES NFR BLD AUTO: 0.2 %
LYMPHOCYTES # BLD AUTO: 1.38 K/UL
LYMPHOCYTES NFR BLD AUTO: 26.3 %
MAN DIFF?: NORMAL
MCHC RBC-ENTMCNC: 30.8 PG
MCHC RBC-ENTMCNC: 31.7 G/DL
MCV RBC AUTO: 97 FL
MONOCYTES # BLD AUTO: 0.41 K/UL
MONOCYTES NFR BLD AUTO: 7.8 %
NEUTROPHILS # BLD AUTO: 3.29 K/UL
NEUTROPHILS NFR BLD AUTO: 62.8 %
PLATELET # BLD AUTO: 170 K/UL
POTASSIUM SERPL-SCNC: 4.1 MMOL/L
PROT SERPL-MCNC: 7.1 G/DL
RBC # BLD: 5.07 M/UL
RBC # FLD: 13.4 %
SODIUM SERPL-SCNC: 140 MMOL/L
URATE SERPL-MCNC: 6.1 MG/DL
WBC # FLD AUTO: 5.24 K/UL

## (undated) DEVICE — CATH IV SAFE BC BLU 22GAX1.0"

## (undated) DEVICE — GLV 7.5 ULTRAFREE MAX

## (undated) DEVICE — SOL INJ LR 500ML

## (undated) DEVICE — TRAY EPIDURAL SINGLE DOSE

## (undated) DEVICE — PACK IV START WITH CHG

## (undated) DEVICE — CATH IV SAFE 24GX3/4

## (undated) DEVICE — NDL SPNL WHIT 22GX3.5IN